# Patient Record
Sex: FEMALE | Race: BLACK OR AFRICAN AMERICAN | NOT HISPANIC OR LATINO | Employment: OTHER | URBAN - METROPOLITAN AREA
[De-identification: names, ages, dates, MRNs, and addresses within clinical notes are randomized per-mention and may not be internally consistent; named-entity substitution may affect disease eponyms.]

---

## 2018-01-30 ENCOUNTER — APPOINTMENT (OUTPATIENT)
Dept: GENERAL RADIOLOGY | Facility: CLINIC | Age: 78
End: 2018-01-30
Attending: EMERGENCY MEDICINE
Payer: COMMERCIAL

## 2018-01-30 ENCOUNTER — HOSPITAL ENCOUNTER (EMERGENCY)
Facility: CLINIC | Age: 78
Discharge: HOME | End: 2018-01-30
Attending: EMERGENCY MEDICINE | Admitting: EMERGENCY MEDICINE
Payer: COMMERCIAL

## 2018-01-30 VITALS
HEART RATE: 77 BPM | TEMPERATURE: 98.3 F | OXYGEN SATURATION: 99 % | WEIGHT: 140 LBS | DIASTOLIC BLOOD PRESSURE: 70 MMHG | SYSTOLIC BLOOD PRESSURE: 155 MMHG | BODY MASS INDEX: 28.28 KG/M2 | RESPIRATION RATE: 16 BRPM

## 2018-01-30 DIAGNOSIS — L03.115 CELLULITIS OF RIGHT FOOT: ICD-10-CM

## 2018-01-30 LAB
ANION GAP SERPL CALCULATED.3IONS-SCNC: 4 MMOL/L (ref 3–14)
BASOPHILS # BLD AUTO: 0 10E9/L (ref 0–0.2)
BASOPHILS NFR BLD AUTO: 0.2 %
BUN SERPL-MCNC: 14 MG/DL (ref 7–30)
CALCIUM SERPL-MCNC: 8.8 MG/DL (ref 8.5–10.1)
CHLORIDE SERPL-SCNC: 102 MMOL/L (ref 94–109)
CO2 SERPL-SCNC: 34 MMOL/L (ref 20–32)
CREAT SERPL-MCNC: 0.73 MG/DL (ref 0.52–1.04)
CRP SERPL-MCNC: 19.1 MG/L (ref 0–8)
CRYSTALS SNV MICRO: NORMAL
DIFFERENTIAL METHOD BLD: NORMAL
EOSINOPHIL # BLD AUTO: 0.2 10E9/L (ref 0–0.7)
EOSINOPHIL NFR BLD AUTO: 2.1 %
ERYTHROCYTE [DISTWIDTH] IN BLOOD BY AUTOMATED COUNT: 13.1 % (ref 10–15)
ERYTHROCYTE [SEDIMENTATION RATE] IN BLOOD BY WESTERGREN METHOD: 42 MM/H (ref 0–30)
GFR SERPL CREATININE-BSD FRML MDRD: 77 ML/MIN/1.7M2
GLUCOSE SERPL-MCNC: 207 MG/DL (ref 70–99)
GRAM STN SPEC: NORMAL
HCT VFR BLD AUTO: 39.9 % (ref 35–47)
HGB BLD-MCNC: 13 G/DL (ref 11.7–15.7)
IMM GRANULOCYTES # BLD: 0 10E9/L (ref 0–0.4)
IMM GRANULOCYTES NFR BLD: 0.1 %
LYMPHOCYTES # BLD AUTO: 2.8 10E9/L (ref 0.8–5.3)
LYMPHOCYTES NFR BLD AUTO: 31.4 %
MCH RBC QN AUTO: 29 PG (ref 26.5–33)
MCHC RBC AUTO-ENTMCNC: 32.6 G/DL (ref 31.5–36.5)
MCV RBC AUTO: 89 FL (ref 78–100)
MONOCYTES # BLD AUTO: 0.3 10E9/L (ref 0–1.3)
MONOCYTES NFR BLD AUTO: 2.9 %
NEUTROPHILS # BLD AUTO: 5.7 10E9/L (ref 1.6–8.3)
NEUTROPHILS NFR BLD AUTO: 63.3 %
NRBC # BLD AUTO: 0 10*3/UL
NRBC BLD AUTO-RTO: 0 /100
PLATELET # BLD AUTO: 282 10E9/L (ref 150–450)
POTASSIUM SERPL-SCNC: 3.7 MMOL/L (ref 3.4–5.3)
RBC # BLD AUTO: 4.48 10E12/L (ref 3.8–5.2)
SODIUM SERPL-SCNC: 140 MMOL/L (ref 133–144)
SPECIMEN SOURCE: NORMAL
URATE SERPL-MCNC: 3.7 MG/DL (ref 2.6–6)
WBC # BLD AUTO: 9 10E9/L (ref 4–11)

## 2018-01-30 PROCEDURE — 87205 SMEAR GRAM STAIN: CPT | Performed by: EMERGENCY MEDICINE

## 2018-01-30 PROCEDURE — 86140 C-REACTIVE PROTEIN: CPT | Performed by: EMERGENCY MEDICINE

## 2018-01-30 PROCEDURE — 99284 EMERGENCY DEPT VISIT MOD MDM: CPT | Mod: Z6 | Performed by: EMERGENCY MEDICINE

## 2018-01-30 PROCEDURE — 73630 X-RAY EXAM OF FOOT: CPT | Mod: RT

## 2018-01-30 PROCEDURE — 87070 CULTURE OTHR SPECIMN AEROBIC: CPT | Performed by: EMERGENCY MEDICINE

## 2018-01-30 PROCEDURE — 87077 CULTURE AEROBIC IDENTIFY: CPT | Performed by: EMERGENCY MEDICINE

## 2018-01-30 PROCEDURE — 80048 BASIC METABOLIC PNL TOTAL CA: CPT | Performed by: EMERGENCY MEDICINE

## 2018-01-30 PROCEDURE — 20600 DRAIN/INJ JOINT/BURSA W/O US: CPT | Performed by: EMERGENCY MEDICINE

## 2018-01-30 PROCEDURE — 85025 COMPLETE CBC W/AUTO DIFF WBC: CPT | Performed by: EMERGENCY MEDICINE

## 2018-01-30 PROCEDURE — 99284 EMERGENCY DEPT VISIT MOD MDM: CPT | Mod: 25 | Performed by: EMERGENCY MEDICINE

## 2018-01-30 PROCEDURE — 87186 SC STD MICRODIL/AGAR DIL: CPT | Performed by: EMERGENCY MEDICINE

## 2018-01-30 PROCEDURE — 85652 RBC SED RATE AUTOMATED: CPT | Performed by: EMERGENCY MEDICINE

## 2018-01-30 PROCEDURE — 25000132 ZZH RX MED GY IP 250 OP 250 PS 637: Performed by: EMERGENCY MEDICINE

## 2018-01-30 PROCEDURE — 84550 ASSAY OF BLOOD/URIC ACID: CPT | Performed by: EMERGENCY MEDICINE

## 2018-01-30 PROCEDURE — 20600 DRAIN/INJ JOINT/BURSA W/O US: CPT | Mod: Z6 | Performed by: EMERGENCY MEDICINE

## 2018-01-30 PROCEDURE — 89060 EXAM SYNOVIAL FLUID CRYSTALS: CPT | Performed by: EMERGENCY MEDICINE

## 2018-01-30 RX ORDER — HYDROCODONE BITARTRATE AND ACETAMINOPHEN 5; 325 MG/1; MG/1
1 TABLET ORAL EVERY 6 HOURS PRN
Qty: 20 TABLET | Refills: 0 | Status: SHIPPED | OUTPATIENT
Start: 2018-01-30 | End: 2020-03-05

## 2018-01-30 RX ORDER — HYDROCODONE BITARTRATE AND ACETAMINOPHEN 5; 325 MG/1; MG/1
1 TABLET ORAL ONCE
Status: COMPLETED | OUTPATIENT
Start: 2018-01-30 | End: 2018-01-30

## 2018-01-30 RX ORDER — ASPIRIN 81 MG
100 TABLET, DELAYED RELEASE (ENTERIC COATED) ORAL 2 TIMES DAILY PRN
Qty: 30 TABLET | Refills: 0 | Status: SHIPPED | OUTPATIENT
Start: 2018-01-30 | End: 2020-03-05

## 2018-01-30 RX ADMIN — HYDROCODONE BITARTRATE AND ACETAMINOPHEN 1 TABLET: 5; 325 TABLET ORAL at 11:19

## 2018-01-30 ASSESSMENT — ENCOUNTER SYMPTOMS
COLOR CHANGE: 0
FEVER: 0
DIFFICULTY URINATING: 0
NECK STIFFNESS: 0
ARTHRALGIAS: 0
EYE REDNESS: 0
ABDOMINAL PAIN: 0
HEADACHES: 0
CONFUSION: 0
SHORTNESS OF BREATH: 0

## 2018-01-30 NOTE — ED PROVIDER NOTES
History     Chief Complaint   Patient presents with     Foot Pain     medial aspect of right foot by base of great toe with pain and swelling; pain described as sharp, warm, pulsating; symptoms present for 2 days     HPI  Ariana Barnes is a 78 year old female with a history of diabetes who is emergency department 2 day history of right foot pain, redness, and swelling.  Patient denies any recent injury.  She denies previous symptoms in the past.  Patient denies any fever, sweats or chills.  She complains of pain localized to the medial aspect of the right foot near the first MTP joint.  Denies ankle pain or leg pain.  No swelling.  Patient denies any recent travel or prolonged immobilization    I have reviewed the Medications, Allergies, Past Medical and Surgical History, and Social History in the Epic system.    Review of Systems   Constitutional: Negative for fever.   HENT: Negative for congestion.    Eyes: Negative for redness.   Respiratory: Negative for shortness of breath.    Cardiovascular: Negative for chest pain.   Gastrointestinal: Negative for abdominal pain.   Genitourinary: Negative for difficulty urinating.   Musculoskeletal: Negative for arthralgias and neck stiffness.        Right great toe pain   Skin: Negative for color change.   Neurological: Negative for headaches.   Psychiatric/Behavioral: Negative for confusion.   All other systems reviewed and are negative.      Physical Exam   BP: 155/70  Pulse: 77  Temp: 98.3  F (36.8  C)  Resp: 16  Weight: 63.5 kg (140 lb)  SpO2: 99 %      Physical Exam   Constitutional: She appears well-developed and well-nourished. No distress.   HENT:   Head: Normocephalic and atraumatic.   Eyes: Pupils are equal, round, and reactive to light. No scleral icterus.   Cardiovascular: Normal rate, regular rhythm, normal heart sounds and intact distal pulses.    Pulmonary/Chest: Effort normal and breath sounds normal. No respiratory distress.   Abdominal: Soft. Bowel sounds  "are normal. There is no tenderness.   Musculoskeletal: She exhibits no edema.        Right foot: There is decreased range of motion (right great toe @ MTP) and tenderness. There is normal capillary refill, no crepitus and no deformity.        Feet:    Neurological: She is alert. She has normal strength. No sensory deficit.   Skin: Skin is warm and dry. No rash noted. She is not diaphoretic.   Nursing note and vitals reviewed.      ED Course     ED Course     Arthrocentesis  Date/Time: 1/30/2018 1:38 PM  Performed by: CONSTANZA GARDUNO  Authorized by: CONSTANZA GARDUNO   Consent: Verbal consent obtained. Written consent obtained.  Consent given by: patient  Time out: Immediately prior to procedure a \"time out\" was called to verify the correct patient, procedure, equipment, support staff and site/side marked as required.  Indications: joint swelling, pain and diagnostic evaluation   Body area: toe  Location details: right big toe  Joint: right big MTP  Local anesthesia used: yes  Anesthesia: local infiltration    Anesthesia:  Local anesthesia used: yes  Local Anesthetic: lidocaine 1% without epinephrine    Sedation:  Patient sedated: no  Preparation: Patient was prepped and draped in the usual sterile fashion.  Needle size: 22 G  Approach: anterolateral.  Aspirate: clear,  yellow and blood-tinged  Patient tolerance: Patient tolerated the procedure well with no immediate complications              Appoxiamtely 1/2 cc of clear, yellow fluid followed by gross blood.    Critical Care time:    Results for orders placed or performed during the hospital encounter of 01/30/18 (from the past 24 hour(s))   CBC with platelets differential   Result Value Ref Range    WBC 9.0 4.0 - 11.0 10e9/L    RBC Count 4.48 3.8 - 5.2 10e12/L    Hemoglobin 13.0 11.7 - 15.7 g/dL    Hematocrit 39.9 35.0 - 47.0 %    MCV 89 78 - 100 fl    MCH 29.0 26.5 - 33.0 pg    MCHC 32.6 31.5 - 36.5 g/dL    RDW 13.1 10.0 - 15.0 %    Platelet Count 282 150 - 450 10e9/L    " Diff Method Automated Method     % Neutrophils 63.3 %    % Lymphocytes 31.4 %    % Monocytes 2.9 %    % Eosinophils 2.1 %    % Basophils 0.2 %    % Immature Granulocytes 0.1 %    Nucleated RBCs 0 0 /100    Absolute Neutrophil 5.7 1.6 - 8.3 10e9/L    Absolute Lymphocytes 2.8 0.8 - 5.3 10e9/L    Absolute Monocytes 0.3 0.0 - 1.3 10e9/L    Absolute Eosinophils 0.2 0.0 - 0.7 10e9/L    Absolute Basophils 0.0 0.0 - 0.2 10e9/L    Abs Immature Granulocytes 0.0 0 - 0.4 10e9/L    Absolute Nucleated RBC 0.0    Basic metabolic panel   Result Value Ref Range    Sodium 140 133 - 144 mmol/L    Potassium 3.7 3.4 - 5.3 mmol/L    Chloride 102 94 - 109 mmol/L    Carbon Dioxide 34 (H) 20 - 32 mmol/L    Anion Gap 4 3 - 14 mmol/L    Glucose 207 (H) 70 - 99 mg/dL    Urea Nitrogen 14 7 - 30 mg/dL    Creatinine 0.73 0.52 - 1.04 mg/dL    GFR Estimate 77 >60 mL/min/1.7m2    GFR Estimate If Black >90 >60 mL/min/1.7m2    Calcium 8.8 8.5 - 10.1 mg/dL   CRP inflammation   Result Value Ref Range    CRP Inflammation 19.1 (H) 0.0 - 8.0 mg/L   Erythrocyte sedimentation rate auto   Result Value Ref Range    Sed Rate 42 (H) 0 - 30 mm/h   Uric acid   Result Value Ref Range    Uric Acid 3.7 2.6 - 6.0 mg/dL   Foot  XR, G/E 3 views, right    Narrative    XR FOOT RT G/E 3 VW 1/30/2018 12:53 PM    HISTORY: Pain.    COMPARISON: None.    FINDINGS: No fracture or malalignment. Osseous structures are within  normal limits.      Impression    IMPRESSION: No acute osseous abnormality.    JENNIFER JIMENEZ MD   Crystal ID joint fluid   Result Value Ref Range    Crystal Analysis No clincally significant crystals seen.  NOCRYS^No clincally significant crystals seen.   Gram stain   Result Value Ref Range    Specimen Description Right Foot Aspirate     Gram Stain No organisms seen     Gram Stain Few  WBC'S seen  predominantly PMN's       Gram Stain Called to   LUCÍA SMILEY RN @1600 1/30/18. CT                Assessments & Plan (with Medical Decision Making)   78 year old  female with erythema, pain, and swelling along the medial aspect of the right foot near the MTP.  She is diabetic.  Differential diagnosis includes crystal arthropathy, septic arthritis, and cellulitis.  The patient is not febrile.  She appears nontoxic.  Her white blood cell count is normal and her inflammatory markers are mildly elevated.  The patient's uric acid level was low.  The differential diagnosis was discussed with the patient and her daughter.  After informed consent was obtained, right MTP arthrocentesis was performed with a lateral approach remote from the area of erythema with return of clear yellow fluid followed by gross blood.  Unfortunately, the blood clotted and cell count and differential was not able to be performed on the specimen.  No crystals were seen and Gram stain is negative.  Aspirate culture is currently pending.  With negative crystals and Gram stain and the appearance of clear yellow fluid, my suspicion is that the patient's foot erythema represents a foot cellulitis.  She does not have a foot ulcer so I think the likelihood of osteomyelitis is low.  The patient will be initiated on Augmentin.  Norco prescribed for pain and Colace as needed for constipation.  Patient was provided with a hard bottom shoe.  She was asked to follow-up with her primary care provider this week for recheck.    I have reviewed the nursing notes.    I have reviewed the findings, diagnosis, plan and need for follow up with the patient.    New Prescriptions    AMOXICILLIN-CLAVULANATE (AUGMENTIN) 875-125 MG PER TABLET    Take 1 tablet by mouth 2 times daily    DOCUSATE SODIUM (COLACE) 100 MG TABLET    Take 100 mg by mouth 2 times daily as needed for constipation    HYDROCODONE-ACETAMINOPHEN (NORCO) 5-325 MG PER TABLET    Take 1 tablet by mouth every 6 hours as needed for moderate to severe pain       Final diagnoses:   Cellulitis of right foot       1/30/2018   [facility], [place], EMERGENCY DEPARTMENT     Lakeisha  MD Abdulaziz  01/30/18 [id number]

## 2018-01-30 NOTE — DISCHARGE INSTRUCTIONS
Take complete course of Augmentin.  Take Norco as needed for pain.  Take Colace if you are constipated while taking Norco.  Wear hard bottom shoe.    Please make an appointment to follow up with Your Primary Care Provider in 3 days.

## 2018-01-30 NOTE — ED AVS SNAPSHOT
[facility], Emergency Department    [address]    [place] [address]    Phone:  [phone]    Fax:  [phone]                                       Ariana Barnes   MRN: [id number]    Department:  [facility], Emergency Department   Date of Visit:  1/30/2018           Patient Information     Date Of Birth          1940        Your diagnoses for this visit were:     Cellulitis of right foot        You were seen by Abdulaziz Suazo MD.        Discharge Instructions       Take complete course of Augmentin.  Take Norco as needed for pain.  Take Colace if you are constipated while taking Norco.  Wear hard bottom shoe.    Please make an appointment to follow up with Your Primary Care Provider in 3 days.    24 Hour Appointment Hotline       To make an appointment at any [facility] clinic, call [phone] ([phone]). If you don't have a family doctor or clinic, we will help you find one. [facility] clinics are conveniently located to serve the needs of you and your family.          ED Discharge Orders     Post Op shoe                    Review of your medicines      START taking        Dose / Directions Last dose taken    amoxicillin-clavulanate 875-125 MG per tablet   Commonly known as:  AUGMENTIN   Dose:  1 tablet   Quantity:  28 tablet        Take 1 tablet by mouth 2 times daily   Refills:  0        docusate sodium 100 MG tablet   Commonly known as:  COLACE   Dose:  100 mg   Quantity:  30 tablet        Take 100 mg by mouth 2 times daily as needed for constipation   Refills:  0        HYDROcodone-acetaminophen 5-325 MG per tablet   Commonly known as:  NORCO   Dose:  1 tablet   Quantity:  20 tablet        Take 1 tablet by mouth every 6 hours as needed for moderate to severe pain   Refills:  0          Our records show that you are taking the medicines listed below. If these are incorrect, please call your family doctor or clinic.        Dose / Directions Last dose taken    Alendronate Sodium 70  MG Tbef        Take by mouth every 7 days   Refills:  0        aspirin 81 MG tablet   Dose:  1 tablet        Take 1 tablet by mouth daily.   Refills:  0        atenolol-chlorthalidone 50-25 MG per tablet   Commonly known as:  TENORETIC   Dose:  1 tablet        Take 1 tablet by mouth daily   Refills:  0        ATORVASTATIN CALCIUM PO   Dose:  40 mg        Take 40 mg by mouth daily   Refills:  0        GEODON PO   Dose:  40 mg        Take 40 mg by mouth 3 times daily.   Refills:  0        GLYBURIDE PO   Dose:  10 mg        Take 10 mg by mouth daily (with breakfast)   Refills:  0        LOSARTAN POTASSIUM PO   Dose:  100 mg        Take 100 mg by mouth daily   Refills:  0        multivitamin, therapeutic with minerals Tabs tablet   Dose:  1 tablet        Take 1 tablet by mouth daily   Refills:  0        sucralfate 1 GM/10ML suspension   Commonly known as:  CARAFATE   Dose:  1 g   Quantity:  420 mL        Take 10 mLs by mouth 4 times daily.   Refills:  1        TRAZODONE HCL PO   Dose:  150 mg        Take 150 mg by mouth nightly as needed.   Refills:  0        TYLENOL 325 MG tablet   Dose:  1-2 tablet   Generic drug:  acetaminophen        Take 1-2 tablets by mouth every 6 hours as needed.   Refills:  0                Prescriptions were sent or printed at these locations (3 Prescriptions)                   Other Prescriptions                Printed at Department/Unit printer (3 of 3)         amoxicillin-clavulanate (AUGMENTIN) 875-125 MG per tablet               HYDROcodone-acetaminophen (NORCO) 5-325 MG per tablet               docusate sodium (COLACE) 100 MG tablet                Procedures and tests performed during your visit     Arthrocentesis    Basic metabolic panel    CBC with platelets differential    CRP inflammation    Crystal ID joint fluid    Erythrocyte sedimentation rate auto    Fluid Culture Aerobic Bacterial    Foot  XR, G/E 3 views, right    Gram stain    Peripheral IV catheter    Uric acid      Orders  "Needing Specimen Collection     None      Pending Results     Date and Time Order Name Status Description    2018 1336 Fluid Culture Aerobic Bacterial In process             Pending Culture Results     Date and Time Order Name Status Description    2018 1336 Fluid Culture Aerobic Bacterial In process             Pending Results Instructions     If you had any lab results that were not finalized at the time of your Discharge, you can call the ED Lab Result RN at [phone]. You will be contacted by this team for any positive Lab results or changes in treatment. The nurses are available 7 days a week from 10A to 6:30P.  You can leave a message 24 hours per day and they will return your call.        Thank you for choosing [facility]       Thank you for choosing [facility] for your care. Our goal is always to provide you with excellent care. Hearing back from our patients is one way we can continue to improve our services. Please take a few minutes to complete the written survey that you may receive in the mail after you visit with us. Thank you!        [employer] Information     [employer] lets you send messages to your doctor, view your test results, renew your prescriptions, schedule appointments and more. To sign up, go to [email or web address] . Click on \"Log in\" on the left side of the screen, which will take you to the Welcome page. Then click on \"Sign up Now\" on the right side of the page.     You will be asked to enter the access code listed below, as well as some personal information. Please follow the directions to create your username and password.     Your access code is: [email or web address]  Expires: 2018  4:10 PM     Your access code will  in 90 days. If you need help or a new code, please call your [facility] clinic or [phone].        Care EveryWhere ID     This is your Care EveryWhere ID. This could be used by other organizations to access your [facility] medical records  [id number]      "   Equal Access to Services     HENNY CASTELLON : Kelly Martins, delmy johansen, kiesha cote. So [facility] [phone].    ATENCIÓN: Si habla español, tiene a carter disposición servicios gratuitos de asistencia lingüística. Llame al [phone].    We comply with applicable federal civil rights laws and [place] laws. We do not discriminate on the basis of race, color, national origin, age, disability, sex, sexual orientation, or gender identity.            After Visit Summary       This is your record. Keep this with you and show to your community pharmacist(s) and doctor(s) at your next visit.

## 2018-01-30 NOTE — ED AVS SNAPSHOT
[facility], Emergency Department    [address]    [facility] [address]    Phone:  [phone]    Fax:  [phone]                                       Ariana Barnes   MRN: [id number]    Department:  [facility], Emergency Department   Date of Visit:  1/30/2018           After Visit Summary Signature Page     I have received my discharge instructions, and my questions have been answered. I have discussed any challenges I see with this plan with the nurse or doctor.    ..........................................................................................................................................  Patient/Patient Representative Signature      ..........................................................................................................................................  Patient Representative Print Name and Relationship to Patient    ..................................................               ................................................  Date                                            Time    ..........................................................................................................................................  Reviewed by Signature/Title    ...................................................              ..............................................  Date                                                            Time

## 2018-02-02 ENCOUNTER — TELEPHONE (OUTPATIENT)
Dept: EMERGENCY MEDICINE | Facility: CLINIC | Age: 78
End: 2018-02-02

## 2018-02-02 NOTE — TELEPHONE ENCOUNTER
[facility] Lab result notification:    [facility] ED lab result protocol used  General Culture    Reason for call  Notify of lab results, assess symptoms,  review ED providers recommendations/discharge instructions (if necessary) and advise per ED lab result f/u protocol    Lab Result  Final FLUID culture report on 2/4/18  [facility] ED discharge antibiotic: Amoxicillin-Clavulanate (Augmentin) 875-125 mg PO tablet,  1 tablet by mouth 2 times daily for 14 days  #1. Bacteria, On day 2, isolated in broth only: Coagulase negative Staphylococcus , which is [SUSCEPTIBLE] to ED discharge antibiotic.   Patient to be notified of result, symptoms's assessed and advised per [facility] ED lab result protocol.    Information table from ED Provider visit on 1/30/18  Symptoms reported at ED visit (Chief complaint, HPI) Ariana Barnes is a 78 year old female with a history of diabetes who is emergency department 2 day history of right foot pain, redness, and swelling.  Patient denies any recent injury.  She denies previous symptoms in the past.  Patient denies any fever, sweats or chills.  She complains of pain localized to the medial aspect of the right foot near the first MTP joint.  Denies ankle pain or leg pain.  No swelling.  Patient denies any recent travel or prolonged immobilization   ED providers Impression and Plan (applicable information) 78 year old female with erythema, pain, and swelling along the medial aspect of the right foot near the MTP.  She is diabetic.  Differential diagnosis includes crystal arthropathy, septic arthritis, and cellulitis.  The patient is not febrile.  She appears nontoxic.  Her white blood cell count is normal and her inflammatory markers are mildly elevated.  The patient's uric acid level was low.  The differential diagnosis was discussed with the patient and her daughter.  After informed consent was obtained, right MTP arthrocentesis was performed with a lateral  approach remote from the area of erythema with return of clear yellow fluid followed by gross blood.  Unfortunately, the blood clotted and cell count and differential was not able to be performed on the specimen.  No crystals were seen and Gram stain is negative.  Aspirate culture is currently pending.  With negative crystals and Gram stain and the appearance of clear yellow fluid, my suspicion is that the patient's foot erythema represents a foot cellulitis.  She does not have a foot ulcer so I think the likelihood of osteomyelitis is low.  The patient will be initiated on Augmentin.  Norco prescribed for pain and Colace as needed for constipation.  Patient was provided with a hard bottom shoe.  She was asked to follow-up with her primary care provider this week for recheck.   Miscellaneous information N/A     RN Assessment (Patient s current Symptoms), include time called.  [Insert Left message here if message left]  Did speak via  to dtr, left msg.  Dtr does report the leg is still painful.  No f/u with PCP yet, is taking abx as prescribed.  Phone number left with dtr, patient currently sleeping.  Advised if any worsening or any concerns, to have Ariana call back.  Advised if any changes on final fluid culture results, we will call back.      Please Contact your PCP clinic or return to the Emergency department if your:    Symptoms return.    Symptoms do not improve after 3 days on antibiotic.    Symptoms do not resolve after completing antibiotic.    Symptoms worsen or other concerning symptom's.    PCP follow-up Questions asked: YES       Danae Ochoa RN    [facility] RN  Lung Nodule and ED Lab Results F/U RN  Epic pool (ED late result f/u RN) : [id number]   # [phone]    Copy of Lab result   Order   Fluid Culture Aerobic Bacterial [[id number]] (Order [id number])   Preliminary Result   Exam Information   Exam Date Exam Time Accession # Results    1/30/18  1:37 PM [id number]     Component Results   Component Collected Lab   Specimen Description 01/30/2018  1:37 PM 75   Right Foot Aspirate   Culture Micro (Abnormal) 01/30/2018  1:37 PM 75   On day 2, isolated in broth only:   Coagulase negative Staphylococcus   These bacteria are part of normal skin antionette, but on occasion, may be true pathogens.     Clinical correlation must be applied to interpreting this microbiology result.      Culture Micro 01/30/2018  1:37 PM 75   Culture in progress

## 2018-02-03 LAB
BACTERIA SPEC CULT: ABNORMAL
SPECIMEN SOURCE: ABNORMAL

## 2020-03-05 ENCOUNTER — HOSPITAL ENCOUNTER (OUTPATIENT)
Facility: CLINIC | Age: 80
Setting detail: OBSERVATION
Discharge: HOME | End: 2020-03-06
Attending: EMERGENCY MEDICINE | Admitting: EMERGENCY MEDICINE
Payer: COMMERCIAL

## 2020-03-05 ENCOUNTER — APPOINTMENT (OUTPATIENT)
Dept: GENERAL RADIOLOGY | Facility: CLINIC | Age: 80
End: 2020-03-05
Attending: EMERGENCY MEDICINE
Payer: COMMERCIAL

## 2020-03-05 DIAGNOSIS — R53.81 PHYSICAL DECONDITIONING: Primary | ICD-10-CM

## 2020-03-05 DIAGNOSIS — R55 NEAR SYNCOPE: ICD-10-CM

## 2020-03-05 DIAGNOSIS — I95.9 TRANSIENT HYPOTENSION: ICD-10-CM

## 2020-03-05 DIAGNOSIS — E11.9 TYPE 2 DIABETES MELLITUS WITHOUT COMPLICATION, WITHOUT LONG-TERM CURRENT USE OF INSULIN (H): ICD-10-CM

## 2020-03-05 LAB
ALBUMIN SERPL-MCNC: 3.5 G/DL (ref 3.4–5)
ALBUMIN UR-MCNC: NEGATIVE MG/DL
ALP SERPL-CCNC: 73 U/L (ref 40–150)
ALT SERPL W P-5'-P-CCNC: 16 U/L (ref 0–50)
ANION GAP SERPL CALCULATED.3IONS-SCNC: 9 MMOL/L (ref 3–14)
APPEARANCE UR: CLEAR
AST SERPL W P-5'-P-CCNC: 23 U/L (ref 0–45)
BACTERIA #/AREA URNS HPF: ABNORMAL /HPF
BASOPHILS # BLD AUTO: 0 10E9/L (ref 0–0.2)
BASOPHILS NFR BLD AUTO: 0.2 %
BILIRUB SERPL-MCNC: 0.5 MG/DL (ref 0.2–1.3)
BILIRUB UR QL STRIP: NEGATIVE
BUN SERPL-MCNC: 20 MG/DL (ref 7–30)
CALCIUM SERPL-MCNC: 9.7 MG/DL (ref 8.5–10.1)
CAPILLARY BLOOD COLLECTION: NORMAL
CHLORIDE SERPL-SCNC: 105 MMOL/L (ref 94–109)
CO2 SERPL-SCNC: 23 MMOL/L (ref 20–32)
COLOR UR AUTO: YELLOW
CREAT SERPL-MCNC: 0.63 MG/DL (ref 0.52–1.04)
DIFFERENTIAL METHOD BLD: ABNORMAL
EOSINOPHIL # BLD AUTO: 0.2 10E9/L (ref 0–0.7)
EOSINOPHIL NFR BLD AUTO: 1.3 %
ERYTHROCYTE [DISTWIDTH] IN BLOOD BY AUTOMATED COUNT: 12.9 % (ref 10–15)
GFR SERPL CREATININE-BSD FRML MDRD: 85 ML/MIN/{1.73_M2}
GLUCOSE SERPL-MCNC: 258 MG/DL (ref 70–99)
GLUCOSE UR STRIP-MCNC: 300 MG/DL
HCT VFR BLD AUTO: 40.7 % (ref 35–47)
HGB BLD-MCNC: 13.8 G/DL (ref 11.7–15.7)
HGB UR QL STRIP: NEGATIVE
HYALINE CASTS #/AREA URNS LPF: 3 /LPF (ref 0–2)
IMM GRANULOCYTES # BLD: 0 10E9/L (ref 0–0.4)
IMM GRANULOCYTES NFR BLD: 0.3 %
INTERPRETATION ECG - MUSE: NORMAL
KETONES UR STRIP-MCNC: NEGATIVE MG/DL
LEUKOCYTE ESTERASE UR QL STRIP: NEGATIVE
LYMPHOCYTES # BLD AUTO: 2.7 10E9/L (ref 0.8–5.3)
LYMPHOCYTES NFR BLD AUTO: 19.9 %
MCH RBC QN AUTO: 29.2 PG (ref 26.5–33)
MCHC RBC AUTO-ENTMCNC: 33.9 G/DL (ref 31.5–36.5)
MCV RBC AUTO: 86 FL (ref 78–100)
MONOCYTES # BLD AUTO: 0.4 10E9/L (ref 0–1.3)
MONOCYTES NFR BLD AUTO: 2.9 %
MUCOUS THREADS #/AREA URNS LPF: PRESENT /LPF
NEUTROPHILS # BLD AUTO: 10.2 10E9/L (ref 1.6–8.3)
NEUTROPHILS NFR BLD AUTO: 75.4 %
NITRATE UR QL: NEGATIVE
NRBC # BLD AUTO: 0 10*3/UL
NRBC BLD AUTO-RTO: 0 /100
PH UR STRIP: 6.5 PH (ref 5–7)
PLATELET # BLD AUTO: 277 10E9/L (ref 150–450)
POTASSIUM SERPL-SCNC: 5.6 MMOL/L (ref 3.4–5.3)
PROT SERPL-MCNC: 8.1 G/DL (ref 6.8–8.8)
RBC # BLD AUTO: 4.72 10E12/L (ref 3.8–5.2)
RBC #/AREA URNS AUTO: 1 /HPF (ref 0–2)
SODIUM SERPL-SCNC: 137 MMOL/L (ref 133–144)
SOURCE: ABNORMAL
SP GR UR STRIP: 1.01 (ref 1–1.03)
SQUAMOUS #/AREA URNS AUTO: 1 /HPF (ref 0–1)
TROPONIN I SERPL-MCNC: <0.015 UG/L (ref 0–0.04)
UROBILINOGEN UR STRIP-MCNC: NORMAL MG/DL (ref 0–2)
WBC # BLD AUTO: 13.6 10E9/L (ref 4–11)
WBC #/AREA URNS AUTO: 3 /HPF (ref 0–5)

## 2020-03-05 PROCEDURE — 81001 URINALYSIS AUTO W/SCOPE: CPT | Performed by: EMERGENCY MEDICINE

## 2020-03-05 PROCEDURE — 84484 ASSAY OF TROPONIN QUANT: CPT | Performed by: EMERGENCY MEDICINE

## 2020-03-05 PROCEDURE — 25800030 ZZH RX IP 258 OP 636: Performed by: EMERGENCY MEDICINE

## 2020-03-05 PROCEDURE — 96361 HYDRATE IV INFUSION ADD-ON: CPT | Performed by: EMERGENCY MEDICINE

## 2020-03-05 PROCEDURE — 93005 ELECTROCARDIOGRAM TRACING: CPT | Performed by: EMERGENCY MEDICINE

## 2020-03-05 PROCEDURE — 96360 HYDRATION IV INFUSION INIT: CPT | Performed by: EMERGENCY MEDICINE

## 2020-03-05 PROCEDURE — 85025 COMPLETE CBC W/AUTO DIFF WBC: CPT | Performed by: EMERGENCY MEDICINE

## 2020-03-05 PROCEDURE — 99285 EMERGENCY DEPT VISIT HI MDM: CPT | Mod: 25 | Performed by: EMERGENCY MEDICINE

## 2020-03-05 PROCEDURE — 93010 ELECTROCARDIOGRAM REPORT: CPT | Mod: Z6 | Performed by: EMERGENCY MEDICINE

## 2020-03-05 PROCEDURE — G0378 HOSPITAL OBSERVATION PER HR: HCPCS

## 2020-03-05 PROCEDURE — 36416 COLLJ CAPILLARY BLOOD SPEC: CPT | Performed by: EMERGENCY MEDICINE

## 2020-03-05 PROCEDURE — 71046 X-RAY EXAM CHEST 2 VIEWS: CPT

## 2020-03-05 PROCEDURE — 80053 COMPREHEN METABOLIC PANEL: CPT | Performed by: EMERGENCY MEDICINE

## 2020-03-05 RX ORDER — CHOLECALCIFEROL (VITAMIN D3) 50 MCG
2000 TABLET ORAL DAILY
COMMUNITY

## 2020-03-05 RX ORDER — ATORVASTATIN CALCIUM 40 MG/1
80 TABLET, FILM COATED ORAL DAILY
Status: DISCONTINUED | OUTPATIENT
Start: 2020-03-06 | End: 2020-03-06 | Stop reason: HOSPADM

## 2020-03-05 RX ORDER — ONDANSETRON 2 MG/ML
4 INJECTION INTRAMUSCULAR; INTRAVENOUS EVERY 6 HOURS PRN
Status: DISCONTINUED | OUTPATIENT
Start: 2020-03-05 | End: 2020-03-06 | Stop reason: HOSPADM

## 2020-03-05 RX ORDER — LIDOCAINE 40 MG/G
CREAM TOPICAL
Status: DISCONTINUED | OUTPATIENT
Start: 2020-03-05 | End: 2020-03-06 | Stop reason: HOSPADM

## 2020-03-05 RX ORDER — ASPIRIN 81 MG/1
81 TABLET ORAL DAILY
Status: DISCONTINUED | OUTPATIENT
Start: 2020-03-06 | End: 2020-03-06 | Stop reason: HOSPADM

## 2020-03-05 RX ORDER — ONDANSETRON 4 MG/1
4 TABLET, ORALLY DISINTEGRATING ORAL EVERY 6 HOURS PRN
Status: DISCONTINUED | OUTPATIENT
Start: 2020-03-05 | End: 2020-03-06 | Stop reason: HOSPADM

## 2020-03-05 RX ORDER — ACETAMINOPHEN 325 MG/1
325-650 TABLET ORAL EVERY 6 HOURS PRN
Status: DISCONTINUED | OUTPATIENT
Start: 2020-03-05 | End: 2020-03-06 | Stop reason: HOSPADM

## 2020-03-05 RX ORDER — NITROGLYCERIN 0.4 MG/1
0.4 TABLET SUBLINGUAL EVERY 5 MIN PRN
Status: DISCONTINUED | OUTPATIENT
Start: 2020-03-05 | End: 2020-03-06 | Stop reason: HOSPADM

## 2020-03-05 RX ORDER — ACETAMINOPHEN 650 MG/1
650 SUPPOSITORY RECTAL EVERY 4 HOURS PRN
Status: DISCONTINUED | OUTPATIENT
Start: 2020-03-05 | End: 2020-03-06 | Stop reason: HOSPADM

## 2020-03-05 RX ORDER — ACETAMINOPHEN 325 MG/1
650 TABLET ORAL EVERY 4 HOURS PRN
Status: DISCONTINUED | OUTPATIENT
Start: 2020-03-05 | End: 2020-03-05

## 2020-03-05 RX ORDER — LOSARTAN POTASSIUM 100 MG/1
100 TABLET ORAL DAILY
COMMUNITY

## 2020-03-05 RX ORDER — SODIUM CHLORIDE 9 MG/ML
INJECTION, SOLUTION INTRAVENOUS ONCE
Status: COMPLETED | OUTPATIENT
Start: 2020-03-05 | End: 2020-03-05

## 2020-03-05 RX ORDER — LOSARTAN POTASSIUM 100 MG/1
100 TABLET ORAL DAILY
Status: DISCONTINUED | OUTPATIENT
Start: 2020-03-06 | End: 2020-03-06 | Stop reason: HOSPADM

## 2020-03-05 RX ADMIN — SODIUM CHLORIDE: 9 INJECTION, SOLUTION INTRAVENOUS at 17:35

## 2020-03-05 RX ADMIN — SODIUM CHLORIDE 1000 ML: 9 INJECTION, SOLUTION INTRAVENOUS at 16:01

## 2020-03-05 ASSESSMENT — ENCOUNTER SYMPTOMS
COUGH: 0
DYSURIA: 0
SHORTNESS OF BREATH: 0
FREQUENCY: 0
ABDOMINAL PAIN: 0
WEAKNESS: 1
FEVER: 0
DIFFICULTY URINATING: 0
SORE THROAT: 0
VOMITING: 0
NUMBNESS: 0
LIGHT-HEADEDNESS: 0
NAUSEA: 0

## 2020-03-05 ASSESSMENT — MIFFLIN-ST. JEOR: SCORE: 978.91

## 2020-03-05 NOTE — ED NOTES
Bed: ED19  Expected date: 3/5/20  Expected time:   Means of arrival:   Comments:  89 yr old with weakness

## 2020-03-05 NOTE — ED PROVIDER NOTES
[facility] [place] ([facility])    3/05/20       History     Chief Complaint   Patient presents with     Generalized Weakness     In clinic with gobal weakness. Vitals stable      HPI  Ariana Barnes is a 80 year old female who has a PMH of HTN and DM2, who presents to the Emergency Department from clinic after an episode of somnolence and generalized weakness.  Patient is here with her daughter who interprets and contributes to the history collected.  Patient was at [facility] clinic to have A1c checked after fasting (was 7.8).  Patient had not had anything to eat today, had water today at the clinic.  During provider exam patient was noted to have difficulty walking, generalized weakness.  Patient states that she felt like she was dying at that time but was not faint or lightheaded.  Daughter and staff noted the patient was also somewhat somnolent and not speaking.  Her blood pressure at that time was 65/38, POC glucose was 246.  Due to patient's weakness and somnolence EMS were called to bring patient to the ED.  Daughter reports that during this wait patient's systolic blood pressure improved to the 90s and that the patient never lost consciousness.  She did not receive IV fluids in clinic as this was deferred to ED.  Patient was given some water in clinic and vomited.  Daughter also reports that patient had a strong urge to have a bowel movement in clinic, attempted to get up to do so but was unable to.  Eventually when patient had access to a toilet she was unable to produce a bowel movement.    Daughter reports that patient has been urinating normally.  Patient denies urinary symptoms. Patient denies chest pain,shortness of breath, fever, cough, nausea, abdominal pain or diarrhea.  Does continue to endorse feeling weak overall.  Daughter manages her medications and visits her daily although patient lives alone.  Daughter states that she helps patient cook meals, shower and dress as  daughter feels that maybe the patient does not want to carry out these tasks herself or is not strong enough.  Patient was reportedly feeling just fine prior to the clinic visit and walked to the clinic on her own.    I have reviewed the Medications, Allergies, Past Medical and Surgical History, and Social History in the [employer] system.  PAST MEDICAL HISTORY:   Past Medical History:   Diagnosis Date     Dementia (H)      Depressive disorder      Diabetes mellitus (H)      Hypertension        PAST SURGICAL HISTORY: History reviewed. No pertinent surgical history.    FAMILY HISTORY: History reviewed. No pertinent family history.    SOCIAL HISTORY:   Social History     Tobacco Use     Smoking status: Never Smoker     Smokeless tobacco: Never Used   Substance Use Topics     Alcohol use: No       Patient's Medications   New Prescriptions    No medications on file   Previous Medications    ACETAMINOPHEN (TYLENOL) 325 MG TABLET    Take 1-2 tablets by mouth every 6 hours as needed.    ASPIRIN 81 MG TABLET    Take 1 tablet by mouth daily.    ATORVASTATIN (LIPITOR) 40 MG TABLET    Take 40 mg by mouth daily     GLYBURIDE (DIABETA /MICRONASE) 5 MG TABLET    Take 10 mg by mouth daily (with breakfast)     LOSARTAN POTASSIUM PO    Take 100 mg by mouth daily    MULTIVITAMIN, THERAPEUTIC WITH MINERALS (THERA-VIT-M) TABS    Take 1 tablet by mouth daily   Modified Medications    No medications on file   Discontinued Medications    ALENDRONATE SODIUM 70 MG TBEF    Take by mouth every 7 days    AMOXICILLIN-CLAVULANATE (AUGMENTIN) 875-125 MG PER TABLET    Take 1 tablet by mouth 2 times daily    ATENOLOL-CHLORTHALIDONE (TENORETIC) 50-25 MG PER TABLET    Take 1 tablet by mouth daily    DOCUSATE SODIUM (COLACE) 100 MG TABLET    Take 100 mg by mouth 2 times daily as needed for constipation    HYDROCODONE-ACETAMINOPHEN (NORCO) 5-325 MG PER TABLET    Take 1 tablet by mouth every 6 hours as needed for moderate to severe pain    SUCRALFATE  (CARAFATE) 1 GM/10ML SUSPENSION    Take 10 mLs by mouth 4 times daily.    TRAZODONE HCL PO    Take 150 mg by mouth nightly as needed.    ZIPRASIDONE HCL (GEODON PO)    Take 40 mg by mouth 3 times daily.          Allergies   Allergen Reactions     Dicyclomine Unknown and Dizziness     Started at [facility] 10/17; d/c'd shortly after due to SEs     Hydrochlorothiazide Unknown     Stopped taking due to abdominal pain     Isosorbide Other (See Comments)     Severe leg pain  Leg pain  Severe leg pain          Review of Systems   Constitutional: Negative for fever.   HENT: Negative for sore throat.    Respiratory: Negative for cough and shortness of breath.    Cardiovascular: Negative for chest pain.   Gastrointestinal: Negative for abdominal pain, nausea and vomiting.   Genitourinary: Negative for difficulty urinating, dysuria, frequency and urgency.   Neurological: Positive for weakness (generalized). Negative for light-headedness and numbness.   All other systems reviewed and are negative.      Physical Exam   BP: 129/79  Pulse: 71  Temp: 97.4  F (36.3  C)  Resp: 16  Weight: 64.3 kg (141 lb 12.8 oz)  SpO2: 100 %      Physical Exam  GEN:  Well developed, no acute distress  HEENT:  PERRL, EOMI, Mucous membranes are moist.   Cardio:  RRR, no murmur, radial pulses equal bilaterally  PULM:  Lungs clear, good air movement, no wheezes, rales   Abd:  Soft, normal bowel sounds, no focal tenderness  Musculoskeletal:  normal range of motion, no lower extremity swelling or calf tenderness  Neuro:  Alert and oriented X3, Follows commands, CN exam is normal, finger to nose is normal, sensation and strength is normal throughout, no pronator drift   Skin:  Warm, dry   ED Course   1:21 PM  The patient was seen and examined by Brigitte Sadler MD in Room ED19.       Procedures             EKG Interpretation:      Interpreted by Brigitte Sadler MD  Time reviewed: 13:40  Symptoms at time of EKG: near syncope today   Rhythm: normal  sinus   Rate: normal  Axis: normal  Ectopy: none  Conduction: normal  ST Segments/ T Waves: No ST-T wave changes, (T wave inversions in lead III are unchanged compared to previous EKG) minimal voltage criteria for LVH, may be normal variant  Q Waves: none  Comparison to prior: Unchanged from August 11, 2016    Clinical Impression: Unchanged EKG    Patient was given normal saline IV for possible dehydration.         Labs are normal except as shown.  Results for orders placed or performed during the hospital encounter of 03/05/20 (from the past 24 hour(s))   EKG 12-lead, tracing only   Result Value Ref Range    Interpretation ECG Click View Image link to view waveform and result    XR Chest 2 Views    Narrative    CHEST TWO VIEWS 3/5/2020 2:25 PM     HISTORY:  Near syncope.    COMPARISON:  August 11, 2016       Impression    IMPRESSION:  There are no acute infiltrates. The cardiac silhouette is  not enlarged. Pulmonary vasculature is unremarkable.     BARBARA DIAZ MD   CBC with platelets differential   Result Value Ref Range    WBC 13.6 (H) 4.0 - 11.0 10e9/L    RBC Count 4.72 3.8 - 5.2 10e12/L    Hemoglobin 13.8 11.7 - 15.7 g/dL    Hematocrit 40.7 35.0 - 47.0 %    MCV 86 78 - 100 fl    MCH 29.2 26.5 - 33.0 pg    MCHC 33.9 31.5 - 36.5 g/dL    RDW 12.9 10.0 - 15.0 %    Platelet Count 277 150 - 450 10e9/L    Diff Method Automated Method     % Neutrophils 75.4 %    % Lymphocytes 19.9 %    % Monocytes 2.9 %    % Eosinophils 1.3 %    % Basophils 0.2 %    % Immature Granulocytes 0.3 %    Nucleated RBCs 0 0 /100    Absolute Neutrophil 10.2 (H) 1.6 - 8.3 10e9/L    Absolute Lymphocytes 2.7 0.8 - 5.3 10e9/L    Absolute Monocytes 0.4 0.0 - 1.3 10e9/L    Absolute Eosinophils 0.2 0.0 - 0.7 10e9/L    Absolute Basophils 0.0 0.0 - 0.2 10e9/L    Abs Immature Granulocytes 0.0 0 - 0.4 10e9/L    Absolute Nucleated RBC 0.0    Comprehensive metabolic panel   Result Value Ref Range    Sodium 137 133 - 144 mmol/L    Potassium 5.6 (H) 3.4 -  5.3 mmol/L    Chloride 105 94 - 109 mmol/L    Carbon Dioxide 23 20 - 32 mmol/L    Anion Gap 9 3 - 14 mmol/L    Glucose 258 (H) 70 - 99 mg/dL    Urea Nitrogen 20 7 - 30 mg/dL    Creatinine 0.63 0.52 - 1.04 mg/dL    GFR Estimate 85 >60 mL/min/[1.73_m2]    GFR Estimate If Black >90 >60 mL/min/[1.73_m2]    Calcium 9.7 8.5 - 10.1 mg/dL    Bilirubin Total 0.5 0.2 - 1.3 mg/dL    Albumin 3.5 3.4 - 5.0 g/dL    Protein Total 8.1 6.8 - 8.8 g/dL    Alkaline Phosphatase 73 40 - 150 U/L    ALT 16 0 - 50 U/L    AST 23 0 - 45 U/L   Troponin I   Result Value Ref Range    Troponin I ES <0.015 0.000 - 0.045 ug/L   Capillary Blood Collection   Result Value Ref Range    Capillary Blood Collection Capillary collection performed    UA with Microscopic reflex to Culture   Result Value Ref Range    Color Urine Yellow     Appearance Urine Clear     Glucose Urine 300 (A) NEG^Negative mg/dL    Bilirubin Urine Negative NEG^Negative    Ketones Urine Negative NEG^Negative mg/dL    Specific Gravity Urine 1.009 1.003 - 1.035    Blood Urine Negative NEG^Negative    pH Urine 6.5 5.0 - 7.0 pH    Protein Albumin Urine Negative NEG^Negative mg/dL    Urobilinogen mg/dL Normal 0.0 - 2.0 mg/dL    Nitrite Urine Negative NEG^Negative    Leukocyte Esterase Urine Negative NEG^Negative    Source Midstream Urine     WBC Urine 3 0 - 5 /HPF    RBC Urine 1 0 - 2 /HPF    Bacteria Urine Few (A) NEG^Negative /HPF    Squamous Epithelial /HPF Urine 1 0 - 1 /HPF    Mucous Urine Present (A) NEG^Negative /LPF    Hyaline Casts 3 (H) 0 - 2 /LPF     Medications   sodium chloride 0.9% infusion (has no administration in time range)   0.9% sodium chloride BOLUS (1,000 mLs Intravenous New Bag 3/5/20 1601)         Chest x-ray was reviewed by me and results are shown here:  Results for orders placed or performed during the hospital encounter of 03/05/20   XR Chest 2 Views    Narrative    CHEST TWO VIEWS 3/5/2020 2:25 PM     HISTORY:  Near syncope.    COMPARISON:  August 11, 2016        Impression    IMPRESSION:  There are no acute infiltrates. The cardiac silhouette is  not enlarged. Pulmonary vasculature is unremarkable.     BARBARA DIAZ MD          Assessments & Plan (with Medical Decision Making)   Patient presents after what is described as a near syncopal event at the clinic earlier today.  She felt quite weak when she got up to walk, and her blood pressure was very low at the time.  She seemed sleepy and was not particularly interactive with her daughter at the time, but did not lose consciousness.  Patient had no chest pain or shortness of breath, however, I do think a cardiac work-up is indicated.  She has no sign of infection remarkable.  There was no sign of seizure activity.  At this time, the plan is to admit to the observation unit for overnight telemetry monitoring and echocardiogram.    I have reviewed the nursing notes.    I have reviewed the findings, diagnosis, plan and need for follow up with the patient.    New Prescriptions    No medications on file       Final diagnoses:   Near syncope   Transient hypotension     ISuleiman, am serving as a trained medical scribe to document services personally performed by Brigitte Sadler MD, based on the provider's statements to me.   Brigitte STEPHENS MD, was physically present and have reviewed and verified the accuracy of this note documented by Suleiman Briggs.     3/5/2020   [facility], [place], EMERGENCY DEPARTMENT     Brigitte Sadler MD  03/05/20 [id number]

## 2020-03-06 ENCOUNTER — APPOINTMENT (OUTPATIENT)
Dept: CARDIOLOGY | Facility: CLINIC | Age: 80
End: 2020-03-06
Attending: PHYSICIAN ASSISTANT
Payer: COMMERCIAL

## 2020-03-06 ENCOUNTER — OFFICE VISIT (OUTPATIENT)
Dept: INTERPRETER SERVICES | Facility: CLINIC | Age: 80
End: 2020-03-06
Payer: COMMERCIAL

## 2020-03-06 ENCOUNTER — APPOINTMENT (OUTPATIENT)
Dept: PHYSICAL THERAPY | Facility: CLINIC | Age: 80
End: 2020-03-06
Attending: PHYSICIAN ASSISTANT
Payer: COMMERCIAL

## 2020-03-06 VITALS
TEMPERATURE: 98.3 F | DIASTOLIC BLOOD PRESSURE: 76 MMHG | BODY MASS INDEX: 26.81 KG/M2 | OXYGEN SATURATION: 97 % | HEIGHT: 59 IN | WEIGHT: 133 LBS | RESPIRATION RATE: 16 BRPM | SYSTOLIC BLOOD PRESSURE: 158 MMHG | HEART RATE: 79 BPM

## 2020-03-06 LAB
ANION GAP SERPL CALCULATED.3IONS-SCNC: 6 MMOL/L (ref 3–14)
BUN SERPL-MCNC: 18 MG/DL (ref 7–30)
CALCIUM SERPL-MCNC: 8.6 MG/DL (ref 8.5–10.1)
CHLORIDE SERPL-SCNC: 110 MMOL/L (ref 94–109)
CO2 SERPL-SCNC: 24 MMOL/L (ref 20–32)
CREAT SERPL-MCNC: 0.67 MG/DL (ref 0.52–1.04)
ERYTHROCYTE [DISTWIDTH] IN BLOOD BY AUTOMATED COUNT: 13.1 % (ref 10–15)
GFR SERPL CREATININE-BSD FRML MDRD: 83 ML/MIN/{1.73_M2}
GLUCOSE SERPL-MCNC: 188 MG/DL (ref 70–99)
HCT VFR BLD AUTO: 37 % (ref 35–47)
HGB BLD-MCNC: 11.9 G/DL (ref 11.7–15.7)
MCH RBC QN AUTO: 28.8 PG (ref 26.5–33)
MCHC RBC AUTO-ENTMCNC: 32.2 G/DL (ref 31.5–36.5)
MCV RBC AUTO: 90 FL (ref 78–100)
PLATELET # BLD AUTO: 274 10E9/L (ref 150–450)
POTASSIUM SERPL-SCNC: 3.4 MMOL/L (ref 3.4–5.3)
RBC # BLD AUTO: 4.13 10E12/L (ref 3.8–5.2)
SODIUM SERPL-SCNC: 140 MMOL/L (ref 133–144)
TROPONIN I SERPL-MCNC: <0.015 UG/L (ref 0–0.04)
WBC # BLD AUTO: 9.8 10E9/L (ref 4–11)

## 2020-03-06 PROCEDURE — 36415 COLL VENOUS BLD VENIPUNCTURE: CPT | Performed by: PHYSICIAN ASSISTANT

## 2020-03-06 PROCEDURE — T1013 SIGN LANG/ORAL INTERPRETER: HCPCS | Mod: U3

## 2020-03-06 PROCEDURE — 40000894 ZZH STATISTIC OT IP EVAL DEFER

## 2020-03-06 PROCEDURE — 84484 ASSAY OF TROPONIN QUANT: CPT | Performed by: PHYSICIAN ASSISTANT

## 2020-03-06 PROCEDURE — 80048 BASIC METABOLIC PNL TOTAL CA: CPT | Performed by: PHYSICIAN ASSISTANT

## 2020-03-06 PROCEDURE — 97530 THERAPEUTIC ACTIVITIES: CPT | Mod: GP

## 2020-03-06 PROCEDURE — 93306 TTE W/DOPPLER COMPLETE: CPT

## 2020-03-06 PROCEDURE — 85027 COMPLETE CBC AUTOMATED: CPT | Performed by: PHYSICIAN ASSISTANT

## 2020-03-06 PROCEDURE — 97161 PT EVAL LOW COMPLEX 20 MIN: CPT | Mod: GP

## 2020-03-06 PROCEDURE — G0378 HOSPITAL OBSERVATION PER HR: HCPCS

## 2020-03-06 PROCEDURE — 25000132 ZZH RX MED GY IP 250 OP 250 PS 637: Performed by: PHYSICIAN ASSISTANT

## 2020-03-06 PROCEDURE — 93306 TTE W/DOPPLER COMPLETE: CPT | Mod: 26 | Performed by: INTERNAL MEDICINE

## 2020-03-06 PROCEDURE — 99220 ZZC INITIAL OBSERVATION CARE,LEVL III: CPT | Mod: 25 | Performed by: EMERGENCY MEDICINE

## 2020-03-06 RX ADMIN — ATORVASTATIN CALCIUM 80 MG: 40 TABLET, FILM COATED ORAL at 08:42

## 2020-03-06 RX ADMIN — LOSARTAN POTASSIUM 100 MG: 100 TABLET, FILM COATED ORAL at 08:42

## 2020-03-06 RX ADMIN — ASPIRIN 81 MG: 81 TABLET, COATED ORAL at 08:42

## 2020-03-06 NOTE — PLAN OF CARE
Discharge Planner PT   Patient plan for discharge: home with A from family   Current status:  VSS on RA.  Supine to sit with IND.  STS with IND. Does move slow but safely.  Family reports she is moving more slowly than normal, pt agrees.  Ambulates 25ft x2; 1 reps with FWW and 1 without.  Demonstrates difficulty maneuvering FWW despite vs and manual cues to steer.   Pt is safe to discharge home with use of 4WW and OP PT in order to improve functional IND. Family and pt on board.   Barriers to return to prior living situation: falls risk, weakness   Recommendations for discharge: resumption of A from family near 24hrs/day, use of 4WW and OP PT.    Rationale for recommendations: OP PT in order to progress IND mobility with walker.        Entered by: Rufino Bustamante 03/06/2020 11:51 AM

## 2020-03-06 NOTE — PLAN OF CARE
Outpatient/Observation goals to be met before discharge home:    - Diagnostic tests and consults completed and resulted- Not met   - No further episodes of syncope and any new arrhythmia addressed with controlled heart rates- Met   - Vital signs normal or at patient baseline and orthostatic vitals are normal and patient not lightheaded with standing- Met   - Tolerating oral intake to maintain hydration- Met   - Safe disposition plan has been identified- Not met     *Nurse to notify provider when observation goals have been met and patient is ready for discharge.

## 2020-03-06 NOTE — ED NOTES
[facility], [place]   ED Nurse to Floor Handoff     Ariana Barnes is a 80 year old female who speaks Swazi and lives with family members,  in a home  They arrived in the ED by ambulance from home    ED Chief Complaint: Generalized Weakness (In clinic with gobal weakness. Vitals stable )    ED Dx;   Final diagnoses:   Near syncope   Transient hypotension         Needed?: Yes    Allergies:   Allergies   Allergen Reactions     Dicyclomine Unknown and Dizziness     Started at [facility] 10/17; d/c'd shortly after due to SEs     Hydrochlorothiazide Unknown     Stopped taking due to abdominal pain     Isosorbide Other (See Comments)     Severe leg pain  Leg pain  Severe leg pain     .  Past Medical Hx:   Past Medical History:   Diagnosis Date     Dementia (H)      Depressive disorder      Diabetes mellitus (H)      Hypertension       Baseline Mental status: WDL  Current Mental Status changes: at basesline    Infection present or suspected this encounter: no  Sepsis suspected: No  Isolation type: No active isolations     Activity level - Baseline/Home:  Independent and Stand with Assist  Activity Level - Current:   Stand with Assist    Bariatric equipment needed?: No    In the ED these meds were given:   Medications   0.9% sodium chloride BOLUS (1,000 mLs Intravenous New Bag 3/5/20 1601)   sodium chloride 0.9% infusion ( Intravenous New Bag 3/5/20 8685)       Drips running?  No    Home pump  No    Current LDAs  Peripheral IV 01/30/18 Right (Active)   Number of days: 765       Peripheral IV 03/05/20 Right Lower forearm (Active)   Number of days: 0       Labs results:   Labs Ordered and Resulted from Time of ED Arrival Up to the Time of Departure from the ED   ROUTINE UA WITH MICROSCOPIC REFLEX TO CULTURE - Abnormal; Notable for the following components:       Result Value    Glucose Urine 300 (*)     Bacteria Urine Few (*)     Mucous Urine Present (*)     Hyaline Casts 3 (*)     All  other components within normal limits   CBC WITH PLATELETS DIFFERENTIAL - Abnormal; Notable for the following components:    WBC 13.6 (*)     Absolute Neutrophil 10.2 (*)     All other components within normal limits   COMPREHENSIVE METABOLIC PANEL - Abnormal; Notable for the following components:    Potassium 5.6 (*)     Glucose 258 (*)     All other components within normal limits   TROPONIN I   CAPILLARY BLOOD COLLECTION       Imaging Studies:   Recent Results (from the past 24 hour(s))   XR Chest 2 Views    Narrative    CHEST TWO VIEWS 3/5/2020 2:25 PM     HISTORY:  Near syncope.    COMPARISON:  August 11, 2016       Impression    IMPRESSION:  There are no acute infiltrates. The cardiac silhouette is  not enlarged. Pulmonary vasculature is unremarkable.     BARBARA DIAZ MD       Recent vital signs:   /79   Pulse 71   Temp 97.4  F (36.3  C)   Resp 16   Wt 64.3 kg (141 lb 12.8 oz)   SpO2 100%   BMI 28.64 kg/m              Cardiac Rhythm: Normal Sinus  Pt needs tele? No  Skin/wound Issues: None    Code Status: Full Code    Pain control: good    Nausea control: good    Abnormal labs/tests/findings requiring intervention:     Family present during ED course? Yes   Family Comments/Social Situation comments: family at bedside    Tasks needing completion: None    Codie Wei RN  [facility] -- [phone] [place] ED  [phone] [facility] ED

## 2020-03-06 NOTE — PLAN OF CARE
Discharge Planner OT   Patient plan for discharge: -  Current status: order received. Per discussion with PT, no acute OT needs indicated. Pt has 24/7 support from family and 4 hours of PCA services. Will complete OT orders  Barriers to return to prior living situation: defer to PT  Recommendations for discharge: defer to PT  Rationale for recommendations: pt admitted under observation status and LOS anticipated to be short, per discussion with PT, no acute OT needs identified as pt has family support at home. Will complete orders.        Entered by: Johnna Hubbard 03/06/2020 9:49 AM

## 2020-03-06 NOTE — PLAN OF CARE
"Observation Goals:    - Diagnostic tests and consults completed and resulted: not met; echo to be completed    - No further episodes of syncope and any new arrhythmia addressed with controlled heart rates: met    - Vital signs normal or at patient baseline and orthostatic vitals are normal and patient not lightheaded with standing: met    - Tolerating oral intake to maintain hydration: met    - Safe disposition plan has been identified: not met    BP (!) 152/73   Pulse 90   Temp 98.3  F (36.8  C)   Resp 16   Ht 1.499 m (4' 11\")   Wt 60.3 kg (133 lb)   SpO2 98%   BMI 26.86 kg/m      "

## 2020-03-06 NOTE — PLAN OF CARE
"Observation Goals:     - Diagnostic tests and consults completed and resulted: met    - No further episodes of syncope and any new arrhythmia addressed with controlled heart rates : met   - Vital signs normal or at patient baseline and orthostatic vitals are normal and patient not lightheaded with standing: met    - Tolerating oral intake to maintain hydration: met    - Safe disposition plan has been identified: met     BP (!) 158/71   Pulse 79   Temp 98.3  F (36.8  C)   Resp 16   Ht 1.499 m (4' 11\")   Wt 60.3 kg (133 lb)   SpO2 98%   BMI 26.86 kg/m      "

## 2020-03-06 NOTE — PROGRESS NOTES
Care Coordinator - Discharge Planning    Admission Date/Time:  3/5/2020  Attending MD:  Manish Lockett MD     Data  Chart reviewed, discussed with interdisciplinary team.   Patient was admitted for:   1. Near syncope    2. Transient hypotension         Assessment   Concerns with insurance coverage for discharge needs: None.  Current Living Situation: Patient lives with family.  Support System: Supportive and Involved  Services Involved: PCA  Transportation at Discharge: Car and Family or friend will provide  Transportation to Medical Appointments:    - Name of caregiver: Self and family  Barriers to Discharge: Medical stability    Patient admitted with syncope.   Pt status reviewed/discussed during care team rounds.  PT has cleared pt to discharge to home with OP therapy.  Pt flagged as needing the MOON.    Met with pt, her son, daughter and .  introduced RNCC role.  Pt lives with family and they are able to provide 24/7 support.  Pt has 4 hours of PCA services per day.   No concerns noted regarding anticipated plan for discharge.  SOL form reviewed/signed and copy given to patient.       Coordination of Care and Referrals: Provided patient/family with options for PCA.      Plan  Anticipated Discharge Date:  TBD  Anticipated Discharge Plan:  TBD    Maryam Lester RN BSN, PHN RN Care Coordinator  Internal Medicine   [phone]  Pager: [phone]  Weekend RN Care Coordinator job code * * * [phone]  [facility] [place]  3/6/2020 9:47 AM

## 2020-03-06 NOTE — DISCHARGE SUMMARY
Discharge Summary    Ariana Barnes MRN# [id number]   YOB: 1940 Age: 80 year old     Date of Admission:  3/5/2020  Date of Discharge:  3/6/2020 12:34 PM  Admitting Physician:  Manish Lockett MD  Discharge Physician:  ROBERT CAT  Discharging Service:  Emergency Department Observation Unit     Primary Provider: [facility]), [facility]          Discharge Diagnosis:   Near syncope  Hypoglycemia              Discharge Disposition:   Discharged to home           Condition on Discharge:   Discharge condition: Stable   Code status on discharge: Full Code           Procedures:   Cardiology procedures perfromed:   ECHO             Discharge Medications:     Current Discharge Medication List      CONTINUE these medications which have NOT CHANGED    Details   acetaminophen (TYLENOL) 325 MG tablet Take 325-650 mg by mouth every 6 hours as needed       aspirin 81 MG tablet Take 81 mg by mouth daily       atorvastatin (LIPITOR) 40 MG tablet Take 80 mg by mouth daily       CALCIUM PO Take 1 tablet by mouth daily       GLIPIZIDE PO Dose unknown      losartan (COZAAR) 100 MG tablet Take 100 mg by mouth daily      multivitamin, therapeutic with minerals (THERA-VIT-M) TABS Take 1 tablet by mouth daily      vitamin D3 (CHOLECALCIFEROL) 2000 units (50 mcg) tablet Take 2,000 Units by mouth daily                    Consultations:   No consultations were requested during this admission             Brief History of Illness:   Ariana Barnes is a 80 year old female with a history of HTN, DM2 who presented to the ED with possible presyncope, weakness and somnolence during outpatient clinic visit.  She reportedly had been fasting for an 11:00 am appointment for labs, and was observed in the clinic to have difficulty walking due to feeling weak.  Per report her blood pressure was taken and was 65/38.  It improved to the 90's without IVF at the clinic.  She did not lose consciousness but told her  "daughter she felt like she was dying.  Of note she had taken her medications that morning including Losartan.  She was transported to the ED for further evaluation.      In the ED she appeared dry and improved with IVF.  Troponin was negative and EKG was non-ischemic.       She was subsequently admitted to observation for cardiac telemetry, resting echo , and serial troponin.          Hospital Course:   1. Symptomatic hypotension: On admission to the observation unit the patient was stable.  She was seen with her daughter at bedside.  She declined hospital-provided .  She still feels a little off, but cannot describe exactly how, and she does feel better than this morning.  She was able to eat and is keeping it down.  The symptoms she had earlier have resolved. Patient did well overnight. No events on telemetry. Her serial troponins are negative. She completed a resting echocardiogram and this was normal. She was evaluated by PT/OT. Patient was found to be mildly deconditioned. PT recommended OP PT for strengthening. Differential includes hypovolemic 2/2 reduced PO/fasting vs hypoglycmeia vs other. Patient HD stable at discharge. Patient and family agreeable to discharge plan   -OP PT     2.DM2.  A1c 7.8 on 3/5/2020.   - Resume Glipizide 5 mg daily   - Monitor BG at home      3. HTN-  - Resume losartan in am.       4.  Leukocytosis.    - Suspect that she was just dry; afebrile without any fever, cough, respiratory issues, GI upset.    - Repeat cbc shows improved leukocytosis at 9.8      5.  Hyperkalemia.  - Noted on initial labs, hemolyzed. K 3.4 at discharge.                 Final Day of Progress before Discharge:       Physical Exam:  Blood pressure (!) 158/71, pulse 79, temperature 98.3  F (36.8  C), resp. rate 16, height 1.499 m (4' 11\"), weight 60.3 kg (133 lb), SpO2 98 %.    EXAM:  Physical Exam   Constitutional: Pt is oriented to person, place, and time.Pt appears well-developed and well-nourished. "   HENT:   Head: Normocephalic and atraumatic.   Eyes: Conjunctivae are normal. Pupils are equal, round, and reactive to light.   Neck: Normal range of motion. Neck supple.   Cardiovascular: Normal rate, regular rhythm, normal heart sounds and intact distal pulses.    Pulmonary/Chest: Effort normal and breath sounds normal. No respiratory distress. Pt has no wheezes. Pt has no rales  Abdominal: Soft. Bowel sounds are normal. Pt exhibits no distension and no mass. No tenderness. Pt has no rebound and no guarding.   Musculoskeletal: Normal range of motion. Pt exhibits no edema.   Neurological: Pt is alert and oriented to person, place, and time. Normal reflexes.   Skin: Skin is warm and dry. No rash noted.   Psychiatric: Pt has a normal mood and affect. Behavior is normal. Judgment and thought content normal.             Data:  All laboratory data reviewed             Significant Results:   None  Results for orders placed or performed during the hospital encounter of 03/05/20   XR Chest 2 Views     Status: None    Narrative    CHEST TWO VIEWS 3/5/2020 2:25 PM     HISTORY:  Near syncope.    COMPARISON:  August 11, 2016       Impression    IMPRESSION:  There are no acute infiltrates. The cardiac silhouette is  not enlarged. Pulmonary vasculature is unremarkable.     BARBARA DIAZ MD   UA with Microscopic reflex to Culture     Status: Abnormal   Result Value Ref Range    Color Urine Yellow     Appearance Urine Clear     Glucose Urine 300 (A) NEG^Negative mg/dL    Bilirubin Urine Negative NEG^Negative    Ketones Urine Negative NEG^Negative mg/dL    Specific Gravity Urine 1.009 1.003 - 1.035    Blood Urine Negative NEG^Negative    pH Urine 6.5 5.0 - 7.0 pH    Protein Albumin Urine Negative NEG^Negative mg/dL    Urobilinogen mg/dL Normal 0.0 - 2.0 mg/dL    Nitrite Urine Negative NEG^Negative    Leukocyte Esterase Urine Negative NEG^Negative    Source Midstream Urine     WBC Urine 3 0 - 5 /HPF    RBC Urine 1 0 - 2 /HPF     Bacteria Urine Few (A) NEG^Negative /HPF    Squamous Epithelial /HPF Urine 1 0 - 1 /HPF    Mucous Urine Present (A) NEG^Negative /LPF    Hyaline Casts 3 (H) 0 - 2 /LPF   CBC with platelets differential     Status: Abnormal   Result Value Ref Range    WBC 13.6 (H) 4.0 - 11.0 10e9/L    RBC Count 4.72 3.8 - 5.2 10e12/L    Hemoglobin 13.8 11.7 - 15.7 g/dL    Hematocrit 40.7 35.0 - 47.0 %    MCV 86 78 - 100 fl    MCH 29.2 26.5 - 33.0 pg    MCHC 33.9 31.5 - 36.5 g/dL    RDW 12.9 10.0 - 15.0 %    Platelet Count 277 150 - 450 10e9/L    Diff Method Automated Method     % Neutrophils 75.4 %    % Lymphocytes 19.9 %    % Monocytes 2.9 %    % Eosinophils 1.3 %    % Basophils 0.2 %    % Immature Granulocytes 0.3 %    Nucleated RBCs 0 0 /100    Absolute Neutrophil 10.2 (H) 1.6 - 8.3 10e9/L    Absolute Lymphocytes 2.7 0.8 - 5.3 10e9/L    Absolute Monocytes 0.4 0.0 - 1.3 10e9/L    Absolute Eosinophils 0.2 0.0 - 0.7 10e9/L    Absolute Basophils 0.0 0.0 - 0.2 10e9/L    Abs Immature Granulocytes 0.0 0 - 0.4 10e9/L    Absolute Nucleated RBC 0.0    Comprehensive metabolic panel     Status: Abnormal   Result Value Ref Range    Sodium 137 133 - 144 mmol/L    Potassium 5.6 (H) 3.4 - 5.3 mmol/L    Chloride 105 94 - 109 mmol/L    Carbon Dioxide 23 20 - 32 mmol/L    Anion Gap 9 3 - 14 mmol/L    Glucose 258 (H) 70 - 99 mg/dL    Urea Nitrogen 20 7 - 30 mg/dL    Creatinine 0.63 0.52 - 1.04 mg/dL    GFR Estimate 85 >60 mL/min/[1.73_m2]    GFR Estimate If Black >90 >60 mL/min/[1.73_m2]    Calcium 9.7 8.5 - 10.1 mg/dL    Bilirubin Total 0.5 0.2 - 1.3 mg/dL    Albumin 3.5 3.4 - 5.0 g/dL    Protein Total 8.1 6.8 - 8.8 g/dL    Alkaline Phosphatase 73 40 - 150 U/L    ALT 16 0 - 50 U/L    AST 23 0 - 45 U/L   Troponin I     Status: None   Result Value Ref Range    Troponin I ES <0.015 0.000 - 0.045 ug/L   Capillary Blood Collection     Status: None   Result Value Ref Range    Capillary Blood Collection Capillary collection performed    CBC with platelets      Status: None   Result Value Ref Range    WBC 9.8 4.0 - 11.0 10e9/L    RBC Count 4.13 3.8 - 5.2 10e12/L    Hemoglobin 11.9 11.7 - 15.7 g/dL    Hematocrit 37.0 35.0 - 47.0 %    MCV 90 78 - 100 fl    MCH 28.8 26.5 - 33.0 pg    MCHC 32.2 31.5 - 36.5 g/dL    RDW 13.1 10.0 - 15.0 %    Platelet Count 274 150 - 450 10e9/L   Basic metabolic panel     Status: Abnormal   Result Value Ref Range    Sodium 140 133 - 144 mmol/L    Potassium 3.4 3.4 - 5.3 mmol/L    Chloride 110 (H) 94 - 109 mmol/L    Carbon Dioxide 24 20 - 32 mmol/L    Anion Gap 6 3 - 14 mmol/L    Glucose 188 (H) 70 - 99 mg/dL    Urea Nitrogen 18 7 - 30 mg/dL    Creatinine 0.67 0.52 - 1.04 mg/dL    GFR Estimate 83 >60 mL/min/[1.73_m2]    GFR Estimate If Black >90 >60 mL/min/[1.73_m2]    Calcium 8.6 8.5 - 10.1 mg/dL   Troponin I     Status: None   Result Value Ref Range    Troponin I ES <0.015 0.000 - 0.045 ug/L   EKG 12-lead, tracing only     Status: None   Result Value Ref Range    Interpretation ECG Click View Image link to view waveform and result    Echocardiogram Complete     Status: None    Narrative    [id number]  [id number]  [id number]  [id number]^RIVKA^MICHA           [facility],[place]  Echocardiography Laboratory  [address] [place] [id number]     Name: CLARKE AMES  MRN: [id number]  : 1940  Study Date: 2020 10:32 AM  Age: 80 yrs  Gender: Female  Patient Location: [facility]  Reason For Study: Syncope  Ordering Physician: MICHA TINEO  Performed By: Peter Miller RDCS     BSA: 1.6 m2  Height: 59 in  Weight: 133 lb  HR: 85  BP: 160/87 mmHg  _____________________________________________________________________________  __        Procedure  Complete Portable Echo Adult.  _____________________________________________________________________________  __        Interpretation Summary  Global and regional left ventricular function is hyperkinetic with an EF of  65-70%.  Global right ventricular function is  normal.  No significant valvular abnormalities were noted.     There is no prior study for direct comparison.  _____________________________________________________________________________  __        Left Ventricle  Global and regional left ventricular function is hyperkinetic with an EF of  65-70%. Left ventricular size is normal. Relative wall thickness is increased  consistent with concentric remodeling. Left ventricular diastolic function is  indeterminate. No regional wall motion abnormalities are seen.     Right Ventricle  The right ventricle is normal size. Global right ventricular function is  normal. Right ventricular wall thickness is normal.     Atria  Both atria appear normal. The atrial septum is intact as assessed by color  Doppler .     Mitral Valve  The mitral valve is normal.        Aortic Valve  Aortic valve is normal in structure and function. The aortic valve is  tricuspid.     Tricuspid Valve  The tricuspid valve is normal. The peak velocity of the tricuspid regurgitant  jet is not obtainable. Pulmonary artery systolic pressure cannot be assessed.     Pulmonic Valve  The pulmonic valve is normal.     Vessels  The aorta root is normal. The thoracic aorta is normal. The pulmonary artery  is normal. IVC diameter <2.1 cm collapsing >50% with sniff suggests a normal  RA pressure of 3 mmHg.     Pericardium  No pericardial effusion is present.        Compared to Previous Study  There is no prior study for direct comparison.     Attestation  I have personally viewed the imaging and agree with the interpretation and  report as documented by the fellow, Kat Vargas, and/or edited by me.  _____________________________________________________________________________  __     MMode/2D Measurements & Calculations  IVSd: 1.4 cm  LVIDd: 3.7 cm  LVIDs: 2.5 cm  LVPWd: 1.3 cm  FS: 33.2 %  LV mass(C)d: 174.2 grams  LV mass(C)dI: 112.3 grams/m2  asc Aorta Diam: 3.0 cm  RWT: 0.68        Doppler Measurements &  Calculations  MV E max erickson: 59.9 cm/sec  MV A max erickson: 96.1 cm/sec  MV E/A: 0.62  MV dec slope: 346.6 cm/sec2  MV dec time: 0.17 sec  PA acc time: 0.10 sec  E/E' av.8  Lateral E/e': 10.3     Medial E/e': 9.2     _____________________________________________________________________________  __           Report approved by: Brien MEJIA 2020 11:44 AM         Recent Results (from the past 48 hour(s))   XR Chest 2 Views    Narrative    CHEST TWO VIEWS 3/5/2020 2:25 PM     HISTORY:  Near syncope.    COMPARISON:  2016       Impression    IMPRESSION:  There are no acute infiltrates. The cardiac silhouette is  not enlarged. Pulmonary vasculature is unremarkable.     BARBARA DIAZ MD   Echocardiogram Complete    Narrative    [id number]  [id number]  [id number]  [id number]^RIVKA^MICHA           [facility],[place]  Echocardiography Laboratory  [address] [place] [id number]     Name: CLARKE AMES  MRN: [id number]  : 1940  Study Date: 2020 10:32 AM  Age: 80 yrs  Gender: Female  Patient Location: [facility]  Reason For Study: Syncope  Ordering Physician: MICHA TINEO  Performed By: Peter Miller RDCS     BSA: 1.6 m2  Height: 59 in  Weight: 133 lb  HR: 85  BP: 160/87 mmHg  _____________________________________________________________________________  __        Procedure  Complete Portable Echo Adult.  _____________________________________________________________________________  __        Interpretation Summary  Global and regional left ventricular function is hyperkinetic with an EF of  65-70%.  Global right ventricular function is normal.  No significant valvular abnormalities were noted.     There is no prior study for direct comparison.  _____________________________________________________________________________  __        Left Ventricle  Global and regional left ventricular function is hyperkinetic with an EF of  65-70%. Left ventricular size is  normal. Relative wall thickness is increased  consistent with concentric remodeling. Left ventricular diastolic function is  indeterminate. No regional wall motion abnormalities are seen.     Right Ventricle  The right ventricle is normal size. Global right ventricular function is  normal. Right ventricular wall thickness is normal.     Atria  Both atria appear normal. The atrial septum is intact as assessed by color  Doppler .     Mitral Valve  The mitral valve is normal.        Aortic Valve  Aortic valve is normal in structure and function. The aortic valve is  tricuspid.     Tricuspid Valve  The tricuspid valve is normal. The peak velocity of the tricuspid regurgitant  jet is not obtainable. Pulmonary artery systolic pressure cannot be assessed.     Pulmonic Valve  The pulmonic valve is normal.     Vessels  The aorta root is normal. The thoracic aorta is normal. The pulmonary artery  is normal. IVC diameter <2.1 cm collapsing >50% with sniff suggests a normal  RA pressure of 3 mmHg.     Pericardium  No pericardial effusion is present.        Compared to Previous Study  There is no prior study for direct comparison.     Attestation  I have personally viewed the imaging and agree with the interpretation and  report as documented by the fellow, Kat Vargas, and/or edited by me.  _____________________________________________________________________________  __     MMode/2D Measurements & Calculations  IVSd: 1.4 cm  LVIDd: 3.7 cm  LVIDs: 2.5 cm  LVPWd: 1.3 cm  FS: 33.2 %  LV mass(C)d: 174.2 grams  LV mass(C)dI: 112.3 grams/m2  asc Aorta Diam: 3.0 cm  RWT: 0.68        Doppler Measurements & Calculations  MV E max erickson: 59.9 cm/sec  MV A max erickson: 96.1 cm/sec  MV E/A: 0.62  MV dec slope: 346.6 cm/sec2  MV dec time: 0.17 sec  PA acc time: 0.10 sec  E/E' av.8  Lateral E/e': 10.3     Medial E/e': 9.2     _____________________________________________________________________________  __           Report approved by: ILKNUR  Brien CORADO 03/06/2020 11:44 AM                   Pending Results:   Unresulted Labs Ordered in the Past 30 Days of this Admission     No orders found for last 31 day(s).                  Discharge Instructions and Follow-Up:     Discharge Procedure Orders   PHYSICAL THERAPY REFERRAL   Standing Status: Future   Referral Priority: Routine Referral Type: Rehab Therapy Physical Therapy   Number of Visits Requested: 1     Reason for your hospital stay   Order Comments: Near syncope     Follow Up and recommended labs and tests   Order Comments: Follow up with primary in 1 week     Activity   Order Comments: Your activity upon discharge: activity as tolerated     Order Specific Question Answer Comments   Is discharge order? Yes      When to contact your care team   Order Comments: Please go to your nearest emergency room If you were to have a change in the type of chest pain i.e more severe, lasting longer or radiating to your shoulder, arm, neck, jaw or back, shortness of breath or increased pain with breathing, coughing up blood, feel dizzy or lightheaded, or notice swelling in one leg.     Discharge Instructions   Order Comments: You were admitted to the observation unit for evaluation of syncope (fainting)  Your EKG was normal. Troponins (a lab test to check if there was damage to the heart tissue) were checked and these were negative. Chest x-ray was normal.  You underwent an echocardiogram test and this was normal. I recommend continuing your home medications and it will be very important to follow up with your primary care doctor early next week or sooner if your symptoms return.     Full Code     Order Specific Question Answer Comments   Code status determined by: Discussion with patient/legal decision maker      Diet   Order Comments: Follow this diet upon discharge: Regular     Order Specific Question Answer Comments   Is discharge order? Yes           Attestation:  Vanesa Parker PA-C.

## 2020-03-06 NOTE — PLAN OF CARE
DC instructions given to pt and children, verbalized understanding.  All belongings with pt, IV DC'd and documented.     Pt discharged to second floor for discharge home via wheelchair.

## 2020-03-06 NOTE — H&P
ED OBSERVATION HISTORY & PHYSICAL    Admission Date: 3/5/20  Attending Physician: Dr. Lockett  NP/PA: Josette Chaudhary PA-C    REASON FOR ADMISSION:   Chief Complaint   Patient presents with     Generalized Weakness     In clinic with gobal weakness. Vitals stable          HPI:    Ariana Barnes is a 80 year old female with a history of HTN, DM2 who presented to the ED with possible presyncope, weakness and somnolence during outpatient clinic visit.  She reportedly had been fasting for an 11:00 am appointment for labs, and was observed in the clinic to have difficulty walking due to feeling weak.  Per report her blood pressure was taken and was 65/38.  It improved to the 90's without IVF at the clinic.  She did not lose consciousness but told her daughter she felt like she was dying.  Of note she had taken her medications that morning including Losartan.  She was transported to the ED for further evaluation.     In the ED she appeared dry and improved with IVF.  Troponin was negative and EKG was non-ischemic.      On admission to the observation unit the patient was stable.  She is seen with her daughter at bedside.  She declines hospital-provided .  She still feels a little off, but cannot describe exactly how, and she does feel better than this morning.  She was able to eat and is keeping it down.  The symptoms she had earlier have resolved.      ROS:    CONSTITUTIONAL: Generally feels well. Denies fever, chills, sweats, fatigue, weakness, weight loss, or appetite changes.  SKIN: Denies rash, itching, bruising, new lumps or bumps, ecchymosis, hair changes or nail changes.  EYES: Denies visual changes, blurred vision, double vision, or eye pain  EARS/NOSE/THROAT: Denies hearing loss, tinnitus, sinus pressure/drainage, PND, nasal congestion, runny nose, epistaxis, sore throat/mouth pain, change in taste, ear pain, bleeding gums, or hoarseness.  RESPIRATORY: Denies dyspnea at rest or with activity, cough, or  hemoptysis.  CARDIOVASCULAR: Denies palpitations, chest pain/pressure, orthopnea, edema or open areas on extremities.  GASTROINTESTINAL: Good appetite and PO intake. Denies dysphagia, heartburn, nausea, vomiting, abdominal pain, constipation, or diarrhea.  GENITOURINARY: Denies dysuria, frequency, urgency, hesitancy, hematuria, or incontinence  MUSCULOSKELTAL: Denies muscle/joint pain and weakness  NEUROLOGIC: Denies headaches, dizziness, numbness or tingling of hands and feet, confusion, memory changes, lightheadedness/dizziness or difficulties with balance.  PSYCHIATRIC: Denies anxiety, depression, mental status changes, or change in mood.  HEME/LYMPH: Denies active bleeding, swollen nodes  VASCULAR ACCESS: Denies pain, redness, or discharge.    ROS negative other than the symptoms noted above.    History:    Past Medical History:   Diagnosis Date     Dementia (H)      Depressive disorder      Diabetes mellitus (H)      Hypertension        History reviewed. No pertinent surgical history.    History reviewed. No pertinent family history.    Social History     Socioeconomic History     Marital status:      Spouse name: Not on file     Number of children: Not on file     Years of education: Not on file     Highest education level: Not on file   Occupational History     Not on file   Social Needs     Financial resource strain: Not on file     Food insecurity:     Worry: Not on file     Inability: Not on file     Transportation needs:     Medical: Not on file     Non-medical: Not on file   Tobacco Use     Smoking status: Never Smoker     Smokeless tobacco: Never Used   Substance and Sexual Activity     Alcohol use: No     Drug use: No     Sexual activity: Not on file   Lifestyle     Physical activity:     Days per week: Not on file     Minutes per session: Not on file     Stress: Not on file   Relationships     Social connections:     Talks on phone: Not on file     Gets together: Not on file     Attends  Jain service: Not on file     Active member of club or organization: Not on file     Attends meetings of clubs or organizations: Not on file     Relationship status: Not on file     Intimate partner violence:     Fear of current or ex partner: Not on file     Emotionally abused: Not on file     Physically abused: Not on file     Forced sexual activity: Not on file   Other Topics Concern     Not on file   Social History Narrative     Not on file       No current facility-administered medications on file prior to encounter.   acetaminophen (TYLENOL) 325 MG tablet, Take 325-650 mg by mouth every 6 hours as needed   aspirin 81 MG tablet, Take 81 mg by mouth daily   atorvastatin (LIPITOR) 40 MG tablet, Take 80 mg by mouth daily   CALCIUM PO, Take 1 tablet by mouth daily   GLIPIZIDE PO, Dose unknown  losartan (COZAAR) 100 MG tablet, Take 100 mg by mouth daily  multivitamin, therapeutic with minerals (THERA-VIT-M) TABS, Take 1 tablet by mouth daily  vitamin D3 (CHOLECALCIFEROL) 2000 units (50 mcg) tablet, Take 2,000 Units by mouth daily         Exam:  Vitals:  B/P: 153/92, T: 99.1, P: 89, R: 16    All vital signs were reviewed.  GENERAL APPEARENCE: Pleasant, generally appears well, A/O x4. NAD.  SKIN: Clean, dry, and intact without visible lesions, rash, jaundice, cyanosis, erythema, ecchymoses to exposed areas.  HEENT: NCAT w/out masses, lesions, or abnormalities. Sclera anicteric, PERRLA, EOMI.  Oral mucosa pink and dry without erythema, exudate, lesions, ulcerations, or thrush. Teeth and gums normal.    NECK: Supple, no masses. No jugular venous distention.   CARDIOVASCULAR: S1, S2 RRR. No murmurs, rubs, or gallops.   RESPIRATORY: Respiratory effort WNL. CTA  bilaterally without crackles/rales/wheeze   GI: Active BS in all 4 quadrants. Abdomen soft and non-tender. No masses or hepatosplenomegaly.  : Deferred  MUSCULOSKELETAL: Gait not assessed. Strength 5/5 in major muscle groups of bilateral UE and LE.   Extremities normal, no gross deformities noted, non-tender to palpation.   PV: 2+ bilateral radial and pedal pulses. No edema noted.   NEURO: CN II-XII grossly intact. Speech normal. Appropriate throughout interview.   Sensation grossly WNL. Finger to nose and rapid alternating movements WDL.  HEME/LYMPH: No visible bleeding.  PSYCHIATRIC: Mentation and affect appear normal  VASCULAR ACCESS: CDI without erythema or discharge. Non-tender.    Data:    Results for orders placed or performed during the hospital encounter of 03/05/20   XR Chest 2 Views     Status: None    Narrative    CHEST TWO VIEWS 3/5/2020 2:25 PM     HISTORY:  Near syncope.    COMPARISON:  August 11, 2016       Impression    IMPRESSION:  There are no acute infiltrates. The cardiac silhouette is  not enlarged. Pulmonary vasculature is unremarkable.     BARBARA DIAZ MD   UA with Microscopic reflex to Culture     Status: Abnormal   Result Value Ref Range    Color Urine Yellow     Appearance Urine Clear     Glucose Urine 300 (A) NEG^Negative mg/dL    Bilirubin Urine Negative NEG^Negative    Ketones Urine Negative NEG^Negative mg/dL    Specific Gravity Urine 1.009 1.003 - 1.035    Blood Urine Negative NEG^Negative    pH Urine 6.5 5.0 - 7.0 pH    Protein Albumin Urine Negative NEG^Negative mg/dL    Urobilinogen mg/dL Normal 0.0 - 2.0 mg/dL    Nitrite Urine Negative NEG^Negative    Leukocyte Esterase Urine Negative NEG^Negative    Source Midstream Urine     WBC Urine 3 0 - 5 /HPF    RBC Urine 1 0 - 2 /HPF    Bacteria Urine Few (A) NEG^Negative /HPF    Squamous Epithelial /HPF Urine 1 0 - 1 /HPF    Mucous Urine Present (A) NEG^Negative /LPF    Hyaline Casts 3 (H) 0 - 2 /LPF   CBC with platelets differential     Status: Abnormal   Result Value Ref Range    WBC 13.6 (H) 4.0 - 11.0 10e9/L    RBC Count 4.72 3.8 - 5.2 10e12/L    Hemoglobin 13.8 11.7 - 15.7 g/dL    Hematocrit 40.7 35.0 - 47.0 %    MCV 86 78 - 100 fl    MCH 29.2 26.5 - 33.0 pg    MCHC 33.9 31.5 -  36.5 g/dL    RDW 12.9 10.0 - 15.0 %    Platelet Count 277 150 - 450 10e9/L    Diff Method Automated Method     % Neutrophils 75.4 %    % Lymphocytes 19.9 %    % Monocytes 2.9 %    % Eosinophils 1.3 %    % Basophils 0.2 %    % Immature Granulocytes 0.3 %    Nucleated RBCs 0 0 /100    Absolute Neutrophil 10.2 (H) 1.6 - 8.3 10e9/L    Absolute Lymphocytes 2.7 0.8 - 5.3 10e9/L    Absolute Monocytes 0.4 0.0 - 1.3 10e9/L    Absolute Eosinophils 0.2 0.0 - 0.7 10e9/L    Absolute Basophils 0.0 0.0 - 0.2 10e9/L    Abs Immature Granulocytes 0.0 0 - 0.4 10e9/L    Absolute Nucleated RBC 0.0    Comprehensive metabolic panel     Status: Abnormal   Result Value Ref Range    Sodium 137 133 - 144 mmol/L    Potassium 5.6 (H) 3.4 - 5.3 mmol/L    Chloride 105 94 - 109 mmol/L    Carbon Dioxide 23 20 - 32 mmol/L    Anion Gap 9 3 - 14 mmol/L    Glucose 258 (H) 70 - 99 mg/dL    Urea Nitrogen 20 7 - 30 mg/dL    Creatinine 0.63 0.52 - 1.04 mg/dL    GFR Estimate 85 >60 mL/min/[1.73_m2]    GFR Estimate If Black >90 >60 mL/min/[1.73_m2]    Calcium 9.7 8.5 - 10.1 mg/dL    Bilirubin Total 0.5 0.2 - 1.3 mg/dL    Albumin 3.5 3.4 - 5.0 g/dL    Protein Total 8.1 6.8 - 8.8 g/dL    Alkaline Phosphatase 73 40 - 150 U/L    ALT 16 0 - 50 U/L    AST 23 0 - 45 U/L   Troponin I     Status: None   Result Value Ref Range    Troponin I ES <0.015 0.000 - 0.045 ug/L   Capillary Blood Collection     Status: None   Result Value Ref Range    Capillary Blood Collection Capillary collection performed    EKG 12-lead, tracing only     Status: None   Result Value Ref Range    Interpretation ECG Click View Image link to view waveform and result              EKG Interpretation:      EKG Number: 1  Interpreted by Josette Chaudhary PA-C  Symptoms at time of EKG: None   Rhythm: Normal sinus   Rate: Normal  Axis: Normal  Ectopy: None  Conduction: Normal  ST Segments/ T Waves: No ST-T wave changes and No acute ischemic changes  Q Waves: None  Comparison to prior: Unchanged from prior  study.    Clinical Impression: normal EKG, unchanged from prior.        Assessment/Plan:  Ariana Barnes is a 80 year old female with a history of DM2, HTN who presented to the ED with episode of symptomatic hypotension in the setting of fasting.  She is admitted to the observation unit.      1. Symptomatic hypotension; differential includes hypovolemic 2/2 reduced PO/fasting vs cardiac cause vs other.  - Admit to observation for cardiac telemetry.  - Resting echo in the morning.  - Repeat Troponin  - Orthostatic vitals.  - PT/OT    2.DM2.    - Diabetic diet.  - Hold oral agents for now.      3. HTN-  - Patient with elevated BP now.    - Resume losartan in am.      4.  Leukocytosis.    - suspect that she is just dry; afebrile without any fever, cough, respiratory issues, GI upset.    - Trend.      5.  Hyperkalemia.  - noted on initial labs, hemolyzed.  - Repeat in AM.      FEN:  -Regular diet as tolerated.  -Monitor BMP and replace electrolytes per protocol    Prophy:  -No VTE prophy as anticipate short observation stay   -Encourage ambulation as tolerated   -for GI prophy  -PRN Senna and Miralax      CODE STATUS:  FULL CODE   DISPOSITION: ED Observation      Josette Chaudhary PA-C  Emergency Department Observation Unit

## 2020-03-06 NOTE — PHARMACY-ADMISSION MEDICATION HISTORY
Admission medication history interview status for the 3/5/2020 admission is complete. See Epic admission navigator for allergy information, pharmacy, prior to admission medications and immunization status.     Medication history interview sources:  Patient's daughter    Changes made to PTA medication list (reason)  Added:   1.) Glipizide PO  2.) Calcium PO  3.) Vitamin D 2000 unit tablet  Deleted:   1.) Glyburide 5mg tablet: take 10mg by mouth daily with breakfast. Discontinued 1 year ago per patient's daughter.  Changed:   1.) Losartan potassium PO: take 100mg by mouth daily. Changed to: losartan 100mg tablet: take 1 tablet by mouth daily.  2.) atorvastatin 40mg tablet: take 1 tablet by mouth daily. Changed to: atorvastatin 40mg tablet: take 2 tablets by mouth daily. Changed per patient's daughter.     Additional medication history information (including reliability of information, actions taken by pharmacist):  1.) Patient's daughter was a good historian. She knew when all medications were last taken but could not confirm the dose of glipizide or the salt form of calcium supplement.  2.) Patient fills outpatient prescriptions at \[facility]\"" pharmacy on [address]. Phone #: [phone].        Prior to Admission medications    Medication Sig Last Dose Taking? Auth Provider   acetaminophen (TYLENOL) 325 MG tablet Take 325-650 mg by mouth every 6 hours as needed  3/4/2020 Yes Reported, Patient   aspirin 81 MG tablet Take 81 mg by mouth daily  3/5/2020 at AM Yes Reported, Patient   atorvastatin (LIPITOR) 40 MG tablet Take 80 mg by mouth daily  3/5/2020 at AM Yes Reported, Patient   CALCIUM PO Take 1 tablet by mouth daily  3/5/2020 at AM Yes Unknown, Entered By History   GLIPIZIDE PO Dose unknown 3/5/2020 at AM Yes Unknown, Entered By History   losartan (COZAAR) 100 MG tablet Take 100 mg by mouth daily 3/5/2020 at AM Yes Unknown, Entered By History   multivitamin, therapeutic with minerals (THERA-VIT-M) TABS  Take 1 tablet by mouth daily 3/5/2020 at AM Yes Reported, Patient   vitamin D3 (CHOLECALCIFEROL) 2000 units (50 mcg) tablet Take 2,000 Units by mouth daily  3/5/2020 at AM Yes Unknown, Entered By History           Medication history completed by: Jared Osborne PD2 Pharmacy Intern

## 2020-03-06 NOTE — PROGRESS NOTES
03/06/20 1100   Quick Adds   Type of Visit Initial PT Evaluation       Present yes   Living Environment   Lives With alone  (but children are avaliable to A near 24 hrs per son and daug)   Living Arrangements house   Home Accessibility no concerns   Transportation Anticipated family or friend will provide   Living Environment Comment Pt lives alone but daughter and son report family is with pt most of day.    Self-Care   Usual Activity Tolerance good   Current Activity Tolerance moderate   Regular Exercise No   Equipment Currently Used at Home none   Activity/Exercise/Self-Care Comment pt does have an SEC and 4WW at home   Functional Level Prior   Ambulation 0-->independent   Transferring 0-->independent   Toileting 0-->independent   Bathing 0-->independent   Communication 0-->understands/communicates without difficulty   Swallowing 0-->swallows foods/liquids without difficulty   Cognition 0 - no cognition issues reported   Fall history within last six months no   Which of the above functional risks had a recent onset or change? ambulation   General Information   Onset of Illness/Injury or Date of Surgery - Date 03/05/20   Referring Physician Josette Chaudhary PA-C   Patient/Family Goals Statement return home    Pertinent History of Current Problem (include personal factors and/or comorbidities that impact the POC) 80 year old female with a history of HTN, DM2 who presented to the ED with possible presyncope, weakness and somnolence during outpatient clinic visit.  She reportedly had been fasting for an 11:00 am appointment for labs, and was observed in the clinic to have difficulty walking due to feeling weak.  Per report her blood pressure was taken and was 65/38.  It improved to the 90's without IVF at the clinic.  She did not lose consciousness but told her daughter she felt like she was dying.  Of note she had taken her medications that morning including Losartan.  She was transported to  "the ED for further evaluation.    Precautions/Limitations fall precautions   Cognitive Status Examination   Orientation orientation to person, place and time   Level of Consciousness alert   Follows Commands and Answers Questions 100% of the time   Personal Safety and Judgment intact   Memory intact   Posture    Posture Not impaired   Range of Motion (ROM)   ROM Comment WFL   Strength   Strength Comments WFL   Bed Mobility   Bed Mobility Comments Supine to sit with IND   Transfer Skills   Transfer Comments IND with STS   Gait   Gait Comments ambulates 25ft x2 slowly with fWW and without AD   Balance   Balance no deficits were identified   General Therapy Interventions   Planned Therapy Interventions gait training;risk factor education;home program guidelines;progressive activity/exercise   Clinical Impression   Criteria for Skilled Therapeutic Intervention yes, treatment indicated   PT Diagnosis impaired functional mobility   Influenced by the following impairments slow gait speed, falls risk   Functional limitations due to impairments IND prolonged mobility   Clinical Presentation Stable/Uncomplicated   Clinical Presentation Rationale clinical judgement   Clinical Decision Making (Complexity) Low complexity   Therapy Frequency Other (see comments)  (1 time eval and treat)   Predicted Duration of Therapy Intervention (days/wks) 1 day   Anticipated Equipment Needs at Discharge front wheeled walker   Anticipated Discharge Disposition Home with Assist;Home with Outpatient Therapy   Risk & Benefits of therapy have been explained Yes   Patient, Family & other staff in agreement with plan of care Yes   [facility] [employer] \"6 Clicks\"   2016, Trustees of [facility], under license to [employer].  All rights reserved.   6 Clicks Short Forms Basic Mobility Inpatient Short Form   [facility] [employer]  \"6 Clicks\" V.2 Basic Mobility Inpatient Short Form   1. Turning from your back to your side while in a flat " bed without using bedrails? 4 - None   2. Moving from lying on your back to sitting on the side of a flat bed without using bedrails? 4 - None   3. Moving to and from a bed to a chair (including a wheelchair)? 3 - A Little   4. Standing up from a chair using your arms (e.g., wheelchair, or bedside chair)? 3 - A Little   5. To walk in hospital room? 3 - A Little   6. Climbing 3-5 steps with a railing? 2 - A Lot   Basic Mobility Raw Score (Score out of 24.Lower scores equate to lower levels of function) 19   Total Evaluation Time   Total Evaluation Time (Minutes) 10

## 2020-03-07 PROCEDURE — 99217 ZZC OBSERVATION CARE DISCHARGE: CPT | Mod: Z6 | Performed by: EMERGENCY MEDICINE

## 2020-05-22 ENCOUNTER — DOCUMENTATION ONLY (OUTPATIENT)
Dept: OTHER | Facility: CLINIC | Age: 80
End: 2020-05-22

## 2021-04-08 ENCOUNTER — OFFICE VISIT (OUTPATIENT)
Dept: NEUROLOGY | Facility: CLINIC | Age: 81
End: 2021-04-08
Payer: COMMERCIAL

## 2021-04-08 VITALS
DIASTOLIC BLOOD PRESSURE: 55 MMHG | HEART RATE: 78 BPM | SYSTOLIC BLOOD PRESSURE: 86 MMHG | WEIGHT: 140 LBS | BODY MASS INDEX: 28.22 KG/M2 | HEIGHT: 59 IN

## 2021-04-08 DIAGNOSIS — R41.9 NEUROCOGNITIVE DISORDER: Primary | ICD-10-CM

## 2021-04-08 PROBLEM — I25.119 CORONARY ARTERY DISEASE INVOLVING NATIVE CORONARY ARTERY WITH ANGINA PECTORIS (H): Status: ACTIVE | Noted: 2017-07-14

## 2021-04-08 PROBLEM — R55 SYNCOPE: Status: RESOLVED | Noted: 2020-03-05 | Resolved: 2021-04-08

## 2021-04-08 PROCEDURE — 99205 OFFICE O/P NEW HI 60 MIN: CPT | Mod: GC | Performed by: PSYCHIATRY & NEUROLOGY

## 2021-04-08 RX ORDER — AMLODIPINE BESYLATE 10 MG/1
TABLET ORAL
COMMUNITY
Start: 2021-03-12

## 2021-04-08 RX ORDER — CARVEDILOL 25 MG/1
12.5 TABLET ORAL 2 TIMES DAILY WITH MEALS
COMMUNITY
Start: 2021-03-12

## 2021-04-08 RX ORDER — SEMAGLUTIDE 1.34 MG/ML
INJECTION, SOLUTION SUBCUTANEOUS
COMMUNITY
Start: 2021-03-12

## 2021-04-08 RX ORDER — ZIPRASIDONE HYDROCHLORIDE 40 MG/1
CAPSULE ORAL
COMMUNITY
Start: 2021-01-31

## 2021-04-08 RX ORDER — CYCLOSPORINE 0 G/ML
SOLUTION/ DROPS OPHTHALMIC; TOPICAL
COMMUNITY
Start: 2021-01-16

## 2021-04-08 ASSESSMENT — MIFFLIN-ST. JEOR: SCORE: 1005.67

## 2021-04-08 NOTE — PROGRESS NOTES
NEUROLOGY CONSULTATION NOTE       [facility] NEUROLOGY [place]  [address] [place] [address]  Tel: [phone]  Fax: [phone]  [email or web address]     Ariana Barnes,  1940, MRN [id number]  PCP: [facility], [phone]  Date: 2021     ASSESSMENT & PLAN     Diagnosis code  1. Neurocognitive disorder   2. Hypotension     Neurocognitive disorder  Patient with likely dementia that is quite progressed with significant impairment in ADL's. Outside labs completed thus far have not revealed obvious etiology. This likely represents Alzheimer dementia, but patient also has risk factors with hypertension and type 2 diabetes for vascular dementia or mixed picture. History of psychiatric diagnosis on antipsychotics for many years with history of tardive dyskinesia does complicate picture somewhat.  - MRI  - Lyme panel   - Methylmalonic acid level   - Homocystine  - Folate  - Vitamin B1  - Vitamin E  - Anticipate starting donepezil pending results     Hypotension  Patient noted to be hypotensive on exam today, but appears clinically stable. Recent adjustment in antihypertensive medications likely contributing. Recommended close follow up with PCP regarding medications.     Thank you again for this referral, please feel free to contact me if you have any questions.      Patient discussed with attending physician, Dr. Charly Taylor, who agrees with above assessment and plan.       Johnna Roberts MD, PGY3          Charly Taylor MD  [facility]  ([facility], [facility]     REASON FOR CONSULTATION Memory Loss and Gait Problem        HISTORY OF PRESENT ILLNESS     We have been requested by Dr. Winnie Vilchis to evaluate Ariana Barnes who is a 81 year old  female for memory loss and gait problem.     Patient has a long-standing issue of memory loss over many years, bu this seems to have been  worsening over the past 6 months and then more significant decline in January that seemed to start after COVID-19 vaccine. She is unable to do many activities of daily living and requires full assistance with dressing, grooming, cooking, finances. She is incontinent of urine at times, but can sometimes toilet with assistance. Family notes that there are times where she even seems to forget how to walk. Her long-term memory is more intact, but she is very forgetful with short-term memory and sometimes also seems to remember things that other people don't remember. She has not had any visual, auditory, tactile, olfactory hallucinations, but does note a history of this.    Geodon was recently discontinued and she was started on amlodipine and Ozempic at her PCP's office.     She had a psychiatric admission in 1996 or 1997 and was placed on antipsychotic medications, but her daughter is not sure what diagnosis she was given. Geodon was recently discontinued at her PCP's office. She does have a history of tardive dyskinesia noted in the chart.      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       PROBLEM LIST   Patient Active Problem List   Diagnosis Code     Essential hypertension I10     DM (diabetes mellitus), type 2 with peripheral vascular complications (H) E11.51     Hyperlipidemia E78.5     Depression F32.9     Compression fracture of L1 lumbar vertebra (H) S32.010A     Coronary artery disease involving native coronary artery with angina pectoris (H) I25.119     IBS (irritable bowel syndrome) K58.9         PAST MEDICAL & SURGICAL HISTORY     Past Medical History:   Patient  has a past medical history of Abdominal pain, chronic, right lower quadrant (12/22/2014), Dementia (H), Depressive disorder, Diabetes mellitus (H), Hypertension, Influenza (12/24/2014), and Syncope (3/5/2020).    Surgical History:  She  has  "no past surgical history on file.     SOCIAL HISTORY     Reviewed, and she  reports that she has never smoked. She has never used smokeless tobacco. She reports that she does not drink alcohol or use drugs.     FAMILY HISTORY     Reviewed, and family history includes Unknown/Adopted in her father and mother.     ALLERGIES     Allergies   Allergen Reactions     Dicyclomine Unknown and Dizziness     Started at [facility] 10/17; d/c'd shortly after due to SEs     Hydrochlorothiazide Unknown     Stopped taking due to abdominal pain     Isosorbide Other (See Comments)     Severe leg pain  Leg pain  Severe leg pain           REVIEW OF SYSTEMS     A 12 point review of system was performed and was negative except as outlined in the history of present illness.    Does endorse some intermittent dizziness.      HOME MEDICATIONS     Current Outpatient Rx   Medication Sig Dispense Refill     acetaminophen (TYLENOL) 325 MG tablet Take 325-650 mg by mouth every 6 hours as needed        amLODIPine (NORVASC) 10 MG tablet        aspirin 81 MG tablet Take 81 mg by mouth daily        atorvastatin (LIPITOR) 40 MG tablet Take 80 mg by mouth daily        CALCIUM PO Take 1 tablet by mouth daily        carvedilol (COREG) 25 MG tablet        CEQUA 0.09 % SOLN        GLIPIZIDE PO Dose unknown       losartan (COZAAR) 100 MG tablet Take 100 mg by mouth daily       multivitamin, therapeutic with minerals (THERA-VIT-M) TABS Take 1 tablet by mouth daily       OYSTER SHELL CALCIUM/D 500-200 MG-UNIT tablet        OZEMPIC, 0.25 OR 0.5 MG/DOSE, 2 MG/1.5ML SOPN        vitamin D3 (CHOLECALCIFEROL) 2000 units (50 mcg) tablet Take 2,000 Units by mouth daily        ziprasidone (GEODON) 40 MG capsule            PHYSICAL EXAM     Vital signs  BP (!) 86/55 (BP Location: Left arm, Patient Position: Sitting)   Pulse 78   Ht 1.499 m (4' 11\")   Wt 63.5 kg (140 lb)   BMI 28.28 kg/m      Weight:   140 lbs 0 oz    Patient is alert and oriented to person, but not " place, time. In no acute distress. Vital signs were reviewed and are documented in electronic medical record. Neck was supple, no carotid bruits, JVD, or lymphadenopathy was noted.   NEUROLOGY EXAM:    Patient s speech was normal with no aphasia or dysarthria.     Short-term memory impaired with inability to remember 3 words on mini-cog exam. Unable to draw clock. Named foods she ate for breakfast, but daughter notes these were incorrect.     Cranial nerves II -XII were intact other than difficulty understanding testing for CN XI    Patient had normal mass, tone and motor strength was 5/5 in all extremities without pronator drift.      Sensation was intact to light touch intact.    Reflexes were 1+ symmetrical with downgoing toes.     Noted to be rubbing at thumbs bilaterally using index finger     Difficulty following commands for FNF testing.     Gait testing was abnormal with very slow gait. Initially stood up and was unable to walk forward.      DIAGNOSTIC STUDIES     PERTINENT RADIOLOGY  Following imaging studies were reviewed:     ECHO 3/5/21   Interpretation Summary  Global and regional left ventricular function is hyperkinetic with an EF of  65-70%.  Global right ventricular function is normal.  No significant valvular abnormalities were noted.     PERTINENT LABS  Following labs were reviewed:  No visits with results within 3 Month(s) from this visit.      Labs 3/12/21  TSH: 1.10  Hematocrit 41.5  Hemoglobin 13.2  MCV 89.8  RDW 13.2  WBC 8.51  Platelet 247  Vitamin D25 hydroxy 37  Vitamin B12 739  Hemoglobin A1C 10.2 (H)  Creatinine 0.67  Total protein 7.8  Albumin 4.0  Bili total 0.5   Alk phos 87  AST 20  ALT 13  Bili direct <0.2  Sodium 138  Potassium 4.2  Chloride 100  CO2 28  BUN 16  Calcium 9.7  AG 10  Glucose 285 (H)   Cholesterol 182  Triglyceride 215 (H)  HDL 41 (H)  (H)     Latest known visit with results is:   Admission on 03/05/2020, Discharged on 03/06/2020   Component Date Value      Color Urine 03/05/2020 Yellow      Appearance Urine 03/05/2020 Clear      Glucose Urine 03/05/2020 300*     Bilirubin Urine 03/05/2020 Negative      Ketones Urine 03/05/2020 Negative      Specific Gravity Urine 03/05/2020 1.009      Blood Urine 03/05/2020 Negative      pH Urine 03/05/2020 6.5      Protein Albumin Urine 03/05/2020 Negative      Urobilinogen mg/dL 03/05/2020 Normal      Nitrite Urine 03/05/2020 Negative      Leukocyte Esterase Urine 03/05/2020 Negative      Source 03/05/2020 Midstream Urine      WBC Urine 03/05/2020 3      RBC Urine 03/05/2020 1      Bacteria Urine 03/05/2020 Few*     Squamous Epithelial /HPF* 03/05/2020 1      Mucous Urine 03/05/2020 Present*     Hyaline Casts 03/05/2020 3*     Interpretation ECG 03/05/2020 Click View Image link to view waveform and result      WBC 03/05/2020 13.6*     RBC Count 03/05/2020 4.72      Hemoglobin 03/05/2020 13.8      Hematocrit 03/05/2020 40.7      MCV 03/05/2020 86      MCH 03/05/2020 29.2      MCHC 03/05/2020 33.9      RDW 03/05/2020 12.9      Platelet Count 03/05/2020 277      Diff Method 03/05/2020 Automated Method      % Neutrophils 03/05/2020 75.4      % Lymphocytes 03/05/2020 19.9      % Monocytes 03/05/2020 2.9      % Eosinophils 03/05/2020 1.3      % Basophils 03/05/2020 0.2      % Immature Granulocytes 03/05/2020 0.3      Nucleated RBCs 03/05/2020 0      Absolute Neutrophil 03/05/2020 10.2*     Absolute Lymphocytes 03/05/2020 2.7      Absolute Monocytes 03/05/2020 0.4      Absolute Eosinophils 03/05/2020 0.2      Absolute Basophils 03/05/2020 0.0      Abs Immature Granulocytes 03/05/2020 0.0      Absolute Nucleated RBC 03/05/2020 0.0      Sodium 03/05/2020 137      Potassium 03/05/2020 5.6*     Chloride 03/05/2020 105      Carbon Dioxide 03/05/2020 23      Anion Gap 03/05/2020 9      Glucose 03/05/2020 258*     Urea Nitrogen 03/05/2020 20      Creatinine 03/05/2020 0.63      GFR Estimate 03/05/2020 85      GFR Estimate If Black 03/05/2020 >90       Calcium 03/05/2020 9.7      Bilirubin Total 03/05/2020 0.5      Albumin 03/05/2020 3.5      Protein Total 03/05/2020 8.1      Alkaline Phosphatase 03/05/2020 73      ALT 03/05/2020 16      AST 03/05/2020 23      Troponin I ES 03/05/2020 <0.015      Capillary Blood Collecti* 03/05/2020 Capillary collection performed      WBC 03/06/2020 9.8      RBC Count 03/06/2020 4.13      Hemoglobin 03/06/2020 11.9      Hematocrit 03/06/2020 37.0      MCV 03/06/2020 90      MCH 03/06/2020 28.8      MCHC 03/06/2020 32.2      RDW 03/06/2020 13.1      Platelet Count 03/06/2020 274      Sodium 03/06/2020 140      Potassium 03/06/2020 3.4      Chloride 03/06/2020 110*     Carbon Dioxide 03/06/2020 24      Anion Gap 03/06/2020 6      Glucose 03/06/2020 188*     Urea Nitrogen 03/06/2020 18      Creatinine 03/06/2020 0.67      GFR Estimate 03/06/2020 83      GFR Estimate If Black 03/06/2020 >90      Calcium 03/06/2020 8.6      Troponin I ES 03/06/2020 <0.015         Total time spent for face to face visit, reviewing labs/imaging studies, counseling and coordination of care was: 1 Hour spent on the date of the encounter doing chart review, review of outside records, review of test results, interpretation of tests, patient visit, documentation and discussion with family       This note was dictated using voice recognition software.  Any grammatical or context distortions are unintentional and inherent to the software.

## 2021-04-08 NOTE — PROGRESS NOTES
NEUROLOGY CONSULTATION NOTE       [facility] NEUROLOGY [place]  [address] [place] [address]  Tel: [phone]  Fax: [phone]  [email or web address]     Ariana Barnes,  1940, MRN [id number]  PCP: [facility], [phone]  Date: 2021     ASSESSMENT & PLAN     Diagnosis code  1. Neurocognitive disorder     ***    Thank you again for this referral, please feel free to contact me if you have any questions.    Charly Taylor MD  [facility]  ([facility], [facility]     REASON FOR CONSULTATION No chief complaint on file.        HISTORY OF PRESENT ILLNESS     We have been requested by  *** to evaluate Ariana Barnes who is a 81 year old  female for ***       PROBLEM LIST   Patient Active Problem List   Diagnosis Code     Essential hypertension I10     DM (diabetes mellitus), type 2 with peripheral vascular complications (H) E11.51     Hyperlipidemia E78.5     Depression F32.9     Compression fracture of L1 lumbar vertebra (H) S32.010A     Coronary artery disease involving native coronary artery with angina pectoris (H) I25.119     IBS (irritable bowel syndrome) K58.9         PAST MEDICAL & SURGICAL HISTORY     Past Medical History:   Patient  has a past medical history of Abdominal pain, chronic, right lower quadrant (2014), Dementia (H), Depressive disorder, Diabetes mellitus (H), Hypertension, Influenza (2014), and Syncope (3/5/2020).    Surgical History:  She  has no past surgical history on file.     SOCIAL HISTORY     Reviewed, and she  reports that she has never smoked. She has never used smokeless tobacco. She reports that she does not drink alcohol or use drugs.     FAMILY HISTORY     Reviewed, and family history is not on file.     ALLERGIES     Allergies   Allergen Reactions     Dicyclomine Unknown and Dizziness     Started at [facility] 10/17; d/c'd shortly  after due to SEs     Hydrochlorothiazide Unknown     Stopped taking due to abdominal pain     Isosorbide Other (See Comments)     Severe leg pain  Leg pain  Severe leg pain           REVIEW OF SYSTEMS     A 12 point review of system was performed and was negative except as outlined in the history of present illness.     HOME MEDICATIONS     Current Outpatient Rx   Medication Sig Dispense Refill     acetaminophen (TYLENOL) 325 MG tablet Take 325-650 mg by mouth every 6 hours as needed        aspirin 81 MG tablet Take 81 mg by mouth daily        atorvastatin (LIPITOR) 40 MG tablet Take 80 mg by mouth daily        CALCIUM PO Take 1 tablet by mouth daily        GLIPIZIDE PO Dose unknown       losartan (COZAAR) 100 MG tablet Take 100 mg by mouth daily       multivitamin, therapeutic with minerals (THERA-VIT-M) TABS Take 1 tablet by mouth daily       vitamin D3 (CHOLECALCIFEROL) 2000 units (50 mcg) tablet Take 2,000 Units by mouth daily            PHYSICAL EXAM     Vital signs  There were no vitals taken for this visit.    Weight:   0 lbs 0 oz    ***     DIAGNOSTIC STUDIES     PERTINENT RADIOLOGY  Following imaging studies were reviewed:          PERTINENT LABS  Following labs were reviewed:  No visits with results within 3 Month(s) from this visit.   Latest known visit with results is:   Admission on 03/05/2020, Discharged on 03/06/2020   Component Date Value     Color Urine 03/05/2020 Yellow      Appearance Urine 03/05/2020 Clear      Glucose Urine 03/05/2020 300*     Bilirubin Urine 03/05/2020 Negative      Ketones Urine 03/05/2020 Negative      Specific Gravity Urine 03/05/2020 1.009      Blood Urine 03/05/2020 Negative      pH Urine 03/05/2020 6.5      Protein Albumin Urine 03/05/2020 Negative      Urobilinogen mg/dL 03/05/2020 Normal      Nitrite Urine 03/05/2020 Negative      Leukocyte Esterase Urine 03/05/2020 Negative      Source 03/05/2020 Midstream Urine      WBC Urine 03/05/2020 3      RBC Urine 03/05/2020 1       Bacteria Urine 03/05/2020 Few*     Squamous Epithelial /HPF* 03/05/2020 1      Mucous Urine 03/05/2020 Present*     Hyaline Casts 03/05/2020 3*     Interpretation ECG 03/05/2020 Click View Image link to view waveform and result      WBC 03/05/2020 13.6*     RBC Count 03/05/2020 4.72      Hemoglobin 03/05/2020 13.8      Hematocrit 03/05/2020 40.7      MCV 03/05/2020 86      MCH 03/05/2020 29.2      MCHC 03/05/2020 33.9      RDW 03/05/2020 12.9      Platelet Count 03/05/2020 277      Diff Method 03/05/2020 Automated Method      % Neutrophils 03/05/2020 75.4      % Lymphocytes 03/05/2020 19.9      % Monocytes 03/05/2020 2.9      % Eosinophils 03/05/2020 1.3      % Basophils 03/05/2020 0.2      % Immature Granulocytes 03/05/2020 0.3      Nucleated RBCs 03/05/2020 0      Absolute Neutrophil 03/05/2020 10.2*     Absolute Lymphocytes 03/05/2020 2.7      Absolute Monocytes 03/05/2020 0.4      Absolute Eosinophils 03/05/2020 0.2      Absolute Basophils 03/05/2020 0.0      Abs Immature Granulocytes 03/05/2020 0.0      Absolute Nucleated RBC 03/05/2020 0.0      Sodium 03/05/2020 137      Potassium 03/05/2020 5.6*     Chloride 03/05/2020 105      Carbon Dioxide 03/05/2020 23      Anion Gap 03/05/2020 9      Glucose 03/05/2020 258*     Urea Nitrogen 03/05/2020 20      Creatinine 03/05/2020 0.63      GFR Estimate 03/05/2020 85      GFR Estimate If Black 03/05/2020 >90      Calcium 03/05/2020 9.7      Bilirubin Total 03/05/2020 0.5      Albumin 03/05/2020 3.5      Protein Total 03/05/2020 8.1      Alkaline Phosphatase 03/05/2020 73      ALT 03/05/2020 16      AST 03/05/2020 23      Troponin I ES 03/05/2020 <0.015      Capillary Blood Collecti* 03/05/2020 Capillary collection performed      WBC 03/06/2020 9.8      RBC Count 03/06/2020 4.13      Hemoglobin 03/06/2020 11.9      Hematocrit 03/06/2020 37.0      MCV 03/06/2020 90      MCH 03/06/2020 28.8      MCHC 03/06/2020 32.2      RDW 03/06/2020 13.1      Platelet Count  03/06/2020 274      Sodium 03/06/2020 140      Potassium 03/06/2020 3.4      Chloride 03/06/2020 110*     Carbon Dioxide 03/06/2020 24      Anion Gap 03/06/2020 6      Glucose 03/06/2020 188*     Urea Nitrogen 03/06/2020 18      Creatinine 03/06/2020 0.67      GFR Estimate 03/06/2020 83      GFR Estimate If Black 03/06/2020 >90      Calcium 03/06/2020 8.6      Troponin I ES 03/06/2020 <0.015         Total time spent for face to face visit, reviewing labs/imaging studies, counseling and coordination of care was: {time:70830} spent on the date of the encounter doing {2021 E&M time in:071470}       This note was dictated using voice recognition software.  Any grammatical or context distortions are unintentional and inherent to the software.

## 2021-04-08 NOTE — LETTER
2021         RE: Ariana Barnes  [address]  [facility] [address]        Dear Colleague,    Thank you for referring your patient, Ariana Barnes, to the [facility] [place]. Please see a copy of my visit note below.    NEUROLOGY CONSULTATION NOTE       [facility] [place]  [address] [place] [address]  Tel: [phone]  Fax: [phone]  [email or web address]     Ariana Barnes,  1940, MRN [id number]  PCP: [facility], [phone]  Date: 2021     ASSESSMENT & PLAN     Diagnosis code  1. Neurocognitive disorder   2. Hypotension     Neurocognitive disorder  Patient with likely dementia that is quite progressed with significant impairment in ADL's. Outside labs completed thus far have not revealed obvious etiology. This likely represents Alzheimer dementia, but patient also has risk factors with hypertension and type 2 diabetes for vascular dementia or mixed picture. History of psychiatric diagnosis on antipsychotics for many years with history of tardive dyskinesia does complicate picture somewhat.  - MRI  - Lyme panel   - Methylmalonic acid level   - Homocystine  - Folate  - Vitamin B1  - Vitamin E  - Anticipate starting donepezil pending results     Hypotension  Patient noted to be hypotensive on exam today, but appears clinically stable. Recent adjustment in antihypertensive medications likely contributing. Recommended close follow up with PCP regarding medications.     Thank you again for this referral, please feel free to contact me if you have any questions.      Patient discussed with attending physician, Dr. Charly Taylor, who agrees with above assessment and plan.       Johnna Roberts MD, PGY3          Charly Taylor MD  [facility]  (Former[facility], [facility]     REASON FOR CONSULTATION Memory Loss and Gait Problem         HISTORY OF PRESENT ILLNESS     We have been requested by Dr. Winnie Vilchis to evaluate Ariana Barnes who is a 81 year old  female for memory loss and gait problem.     Patient has a long-standing issue of memory loss over many years, bu this seems to have been worsening over the past 6 months and then more significant decline in January that seemed to start after COVID-19 vaccine. She is unable to do many activities of daily living and requires full assistance with dressing, grooming, cooking, finances. She is incontinent of urine at times, but can sometimes toilet with assistance. Family notes that there are times where she even seems to forget how to walk. Her long-term memory is more intact, but she is very forgetful with short-term memory and sometimes also seems to remember things that other people don't remember. She has not had any visual, auditory, tactile, olfactory hallucinations, but does note a history of this.    Geodon was recently discontinued and she was started on amlodipine and Ozempic at her PCP's office.     She had a psychiatric admission in 1996 or 1997 and was placed on antipsychotic medications, but her daughter is not sure what diagnosis she was given. Geodon was recently discontinued at her PCP's office. She does have a history of tardive dyskinesia noted in the chart.      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       PROBLEM LIST   Patient Active Problem List   Diagnosis Code     Essential hypertension I10     DM (diabetes mellitus), type 2 with peripheral vascular complications (H) E11.51     Hyperlipidemia E78.5     Depression F32.9     Compression fracture of L1 lumbar vertebra (H) S32.010A     Coronary artery disease involving native coronary artery with angina pectoris (H) I25.119     IBS (irritable bowel syndrome) K58.9         PAST MEDICAL & SURGICAL HISTORY     Past  Medical History:   Patient  has a past medical history of Abdominal pain, chronic, right lower quadrant (12/22/2014), Dementia (H), Depressive disorder, Diabetes mellitus (H), Hypertension, Influenza (12/24/2014), and Syncope (3/5/2020).    Surgical History:  She  has no past surgical history on file.     SOCIAL HISTORY     Reviewed, and she  reports that she has never smoked. She has never used smokeless tobacco. She reports that she does not drink alcohol or use drugs.     FAMILY HISTORY     Reviewed, and family history includes Unknown/Adopted in her father and mother.     ALLERGIES     Allergies   Allergen Reactions     Dicyclomine Unknown and Dizziness     Started at [facility] 10/17; d/c'd shortly after due to SEs     Hydrochlorothiazide Unknown     Stopped taking due to abdominal pain     Isosorbide Other (See Comments)     Severe leg pain  Leg pain  Severe leg pain           REVIEW OF SYSTEMS     A 12 point review of system was performed and was negative except as outlined in the history of present illness.    Does endorse some intermittent dizziness.      HOME MEDICATIONS     Current Outpatient Rx   Medication Sig Dispense Refill     acetaminophen (TYLENOL) 325 MG tablet Take 325-650 mg by mouth every 6 hours as needed        amLODIPine (NORVASC) 10 MG tablet        aspirin 81 MG tablet Take 81 mg by mouth daily        atorvastatin (LIPITOR) 40 MG tablet Take 80 mg by mouth daily        CALCIUM PO Take 1 tablet by mouth daily        carvedilol (COREG) 25 MG tablet        CEQUA 0.09 % SOLN        GLIPIZIDE PO Dose unknown       losartan (COZAAR) 100 MG tablet Take 100 mg by mouth daily       multivitamin, therapeutic with minerals (THERA-VIT-M) TABS Take 1 tablet by mouth daily       OYSTER SHELL CALCIUM/D 500-200 MG-UNIT tablet        OZEMPIC, 0.25 OR 0.5 MG/DOSE, 2 MG/1.5ML SOPN        vitamin D3 (CHOLECALCIFEROL) 2000 units (50 mcg) tablet Take 2,000 Units by mouth daily        ziprasidone (GEODON) 40 MG  "capsule            PHYSICAL EXAM     Vital signs  BP (!) 86/55 (BP Location: Left arm, Patient Position: Sitting)   Pulse 78   Ht 1.499 m (4' 11\")   Wt 63.5 kg (140 lb)   BMI 28.28 kg/m      Weight:   140 lbs 0 oz    Patient is alert and oriented to person, but not place, time. In no acute distress. Vital signs were reviewed and are documented in electronic medical record. Neck was supple, no carotid bruits, JVD, or lymphadenopathy was noted.   NEUROLOGY EXAM:    Patient s speech was normal with no aphasia or dysarthria.     Short-term memory impaired with inability to remember 3 words on mini-cog exam. Unable to draw clock. Named foods she ate for breakfast, but daughter notes these were incorrect.     Cranial nerves II -XII were intact other than difficulty understanding testing for CN XI    Patient had normal mass, tone and motor strength was 5/5 in all extremities without pronator drift.      Sensation was intact to light touch intact.    Reflexes were 1+ symmetrical with downgoing toes.     Noted to be rubbing at thumbs bilaterally using index finger     Difficulty following commands for FNF testing.     Gait testing was abnormal with very slow gait. Initially stood up and was unable to walk forward.      DIAGNOSTIC STUDIES     PERTINENT RADIOLOGY  Following imaging studies were reviewed:     ECHO 3/5/21   Interpretation Summary  Global and regional left ventricular function is hyperkinetic with an EF of  65-70%.  Global right ventricular function is normal.  No significant valvular abnormalities were noted.     PERTINENT LABS  Following labs were reviewed:  No visits with results within 3 Month(s) from this visit.      Labs 3/12/21  TSH: 1.10  Hematocrit 41.5  Hemoglobin 13.2  MCV 89.8  RDW 13.2  WBC 8.51  Platelet 247  Vitamin D25 hydroxy 37  Vitamin B12 739  Hemoglobin A1C 10.2 (H)  Creatinine 0.67  Total protein 7.8  Albumin 4.0  Bili total 0.5   Alk phos 87  AST 20  ALT 13  Bili direct <0.2  Sodium " 138  Potassium 4.2  Chloride 100  CO2 28  BUN 16  Calcium 9.7  AG 10  Glucose 285 (H)   Cholesterol 182  Triglyceride 215 (H)  HDL 41 (H)  (H)     Latest known visit with results is:   Admission on 03/05/2020, Discharged on 03/06/2020   Component Date Value     Color Urine 03/05/2020 Yellow      Appearance Urine 03/05/2020 Clear      Glucose Urine 03/05/2020 300*     Bilirubin Urine 03/05/2020 Negative      Ketones Urine 03/05/2020 Negative      Specific Gravity Urine 03/05/2020 1.009      Blood Urine 03/05/2020 Negative      pH Urine 03/05/2020 6.5      Protein Albumin Urine 03/05/2020 Negative      Urobilinogen mg/dL 03/05/2020 Normal      Nitrite Urine 03/05/2020 Negative      Leukocyte Esterase Urine 03/05/2020 Negative      Source 03/05/2020 Midstream Urine      WBC Urine 03/05/2020 3      RBC Urine 03/05/2020 1      Bacteria Urine 03/05/2020 Few*     Squamous Epithelial /HPF* 03/05/2020 1      Mucous Urine 03/05/2020 Present*     Hyaline Casts 03/05/2020 3*     Interpretation ECG 03/05/2020 Click View Image link to view waveform and result      WBC 03/05/2020 13.6*     RBC Count 03/05/2020 4.72      Hemoglobin 03/05/2020 13.8      Hematocrit 03/05/2020 40.7      MCV 03/05/2020 86      MCH 03/05/2020 29.2      MCHC 03/05/2020 33.9      RDW 03/05/2020 12.9      Platelet Count 03/05/2020 277      Diff Method 03/05/2020 Automated Method      % Neutrophils 03/05/2020 75.4      % Lymphocytes 03/05/2020 19.9      % Monocytes 03/05/2020 2.9      % Eosinophils 03/05/2020 1.3      % Basophils 03/05/2020 0.2      % Immature Granulocytes 03/05/2020 0.3      Nucleated RBCs 03/05/2020 0      Absolute Neutrophil 03/05/2020 10.2*     Absolute Lymphocytes 03/05/2020 2.7      Absolute Monocytes 03/05/2020 0.4      Absolute Eosinophils 03/05/2020 0.2      Absolute Basophils 03/05/2020 0.0      Abs Immature Granulocytes 03/05/2020 0.0      Absolute Nucleated RBC 03/05/2020 0.0      Sodium 03/05/2020 137      Potassium  03/05/2020 5.6*     Chloride 03/05/2020 105      Carbon Dioxide 03/05/2020 23      Anion Gap 03/05/2020 9      Glucose 03/05/2020 258*     Urea Nitrogen 03/05/2020 20      Creatinine 03/05/2020 0.63      GFR Estimate 03/05/2020 85      GFR Estimate If Black 03/05/2020 >90      Calcium 03/05/2020 9.7      Bilirubin Total 03/05/2020 0.5      Albumin 03/05/2020 3.5      Protein Total 03/05/2020 8.1      Alkaline Phosphatase 03/05/2020 73      ALT 03/05/2020 16      AST 03/05/2020 23      Troponin I ES 03/05/2020 <0.015      Capillary Blood Collecti* 03/05/2020 Capillary collection performed      WBC 03/06/2020 9.8      RBC Count 03/06/2020 4.13      Hemoglobin 03/06/2020 11.9      Hematocrit 03/06/2020 37.0      MCV 03/06/2020 90      MCH 03/06/2020 28.8      MCHC 03/06/2020 32.2      RDW 03/06/2020 13.1      Platelet Count 03/06/2020 274      Sodium 03/06/2020 140      Potassium 03/06/2020 3.4      Chloride 03/06/2020 110*     Carbon Dioxide 03/06/2020 24      Anion Gap 03/06/2020 6      Glucose 03/06/2020 188*     Urea Nitrogen 03/06/2020 18      Creatinine 03/06/2020 0.67      GFR Estimate 03/06/2020 83      GFR Estimate If Black 03/06/2020 >90      Calcium 03/06/2020 8.6      Troponin I ES 03/06/2020 <0.015         Total time spent for face to face visit, reviewing labs/imaging studies, counseling and coordination of care was: 1 Hour spent on the date of the encounter doing chart review, review of outside records, review of test results, interpretation of tests, patient visit, documentation and discussion with family       This note was dictated using voice recognition software.  Any grammatical or context distortions are unintentional and inherent to the software.       Attestation signed by Charly Taylor MD at 4/8/2021  1:29 PM:  NEUROLOGY  NOTE       [facility] NEUROLOGY [place] [address] [place] [address]  Tel: [phone]  Fax: [phone]  [email or web address]     I performed a  history and physical examination of the patient and discussed management with the resident. I reviewed the resident s note and agree with the documented findings and plan of care.     Total time spent examining patient, counseling, reviewing labs/imaging studies and coordination of care was: 1 Hour      Charly Taylor MD  [facility]  (Formerly, [facility]    This note was dictated using voice recognition software.  Any grammatical or context distortions are unintentional and inherent to the software.             Again, thank you for allowing me to participate in the care of your patient.        Sincerely,        Charly Taylor MD

## 2021-04-30 DIAGNOSIS — R41.9 NEUROCOGNITIVE DISORDER: ICD-10-CM

## 2021-04-30 LAB — FOLATE SERPL-MCNC: 39.7 NG/ML

## 2021-04-30 PROCEDURE — 82746 ASSAY OF FOLIC ACID SERUM: CPT | Performed by: PSYCHIATRY & NEUROLOGY

## 2021-04-30 PROCEDURE — 84446 ASSAY OF VITAMIN E: CPT | Performed by: PSYCHIATRY & NEUROLOGY

## 2021-04-30 PROCEDURE — 36415 COLL VENOUS BLD VENIPUNCTURE: CPT | Performed by: PSYCHIATRY & NEUROLOGY

## 2021-04-30 PROCEDURE — 83090 ASSAY OF HOMOCYSTEINE: CPT | Performed by: PSYCHIATRY & NEUROLOGY

## 2021-04-30 PROCEDURE — 83921 ORGANIC ACID SINGLE QUANT: CPT | Performed by: PSYCHIATRY & NEUROLOGY

## 2021-04-30 PROCEDURE — 84425 ASSAY OF VITAMIN B-1: CPT | Performed by: PSYCHIATRY & NEUROLOGY

## 2021-05-04 LAB
A-TOCOPHEROL VIT E SERPL-MCNC: 16.8 MG/L (ref 5.5–18)
BETA+GAMMA TOCOPHEROL SERPL-MCNC: 1.6 MG/L (ref 0–6)
VIT B1 BLD-MCNC: 151 NMOL/L (ref 70–180)

## 2021-05-05 LAB — HCYS SERPL-SCNC: 13.4 UMOL/L (ref 4–12)

## 2021-05-06 LAB — METHYLMALONATE SERPL-SCNC: 0.19 UMOL/L (ref 0–0.4)

## 2021-05-08 ENCOUNTER — APPOINTMENT (OUTPATIENT)
Dept: CT IMAGING | Facility: CLINIC | Age: 81
End: 2021-05-08
Attending: EMERGENCY MEDICINE
Payer: COMMERCIAL

## 2021-05-08 ENCOUNTER — APPOINTMENT (OUTPATIENT)
Dept: ULTRASOUND IMAGING | Facility: CLINIC | Age: 81
End: 2021-05-08
Attending: EMERGENCY MEDICINE
Payer: COMMERCIAL

## 2021-05-08 ENCOUNTER — PATIENT OUTREACH (OUTPATIENT)
Dept: CARE COORDINATION | Facility: CLINIC | Age: 81
End: 2021-05-08

## 2021-05-08 ENCOUNTER — ANCILLARY PROCEDURE (OUTPATIENT)
Dept: MRI IMAGING | Facility: CLINIC | Age: 81
End: 2021-05-08
Attending: PSYCHIATRY & NEUROLOGY
Payer: COMMERCIAL

## 2021-05-08 ENCOUNTER — HOSPITAL ENCOUNTER (EMERGENCY)
Facility: CLINIC | Age: 81
Discharge: HOME | End: 2021-05-08
Attending: EMERGENCY MEDICINE | Admitting: EMERGENCY MEDICINE
Payer: COMMERCIAL

## 2021-05-08 VITALS
WEIGHT: 140 LBS | TEMPERATURE: 98 F | SYSTOLIC BLOOD PRESSURE: 115 MMHG | HEIGHT: 59 IN | BODY MASS INDEX: 28.22 KG/M2 | RESPIRATION RATE: 20 BRPM | HEART RATE: 92 BPM | OXYGEN SATURATION: 98 % | DIASTOLIC BLOOD PRESSURE: 81 MMHG

## 2021-05-08 DIAGNOSIS — I63.9 CEREBROVASCULAR ACCIDENT (CVA), UNSPECIFIED MECHANISM (H): ICD-10-CM

## 2021-05-08 DIAGNOSIS — R41.9 NEUROCOGNITIVE DISORDER: ICD-10-CM

## 2021-05-08 DIAGNOSIS — I10 ESSENTIAL HYPERTENSION, MALIGNANT: ICD-10-CM

## 2021-05-08 DIAGNOSIS — Z11.52 ENCOUNTER FOR SCREENING LABORATORY TESTING FOR SEVERE ACUTE RESPIRATORY SYNDROME CORONAVIRUS 2 (SARS-COV-2): ICD-10-CM

## 2021-05-08 LAB
ALBUMIN UR-MCNC: NEGATIVE MG/DL
ANION GAP SERPL CALCULATED.3IONS-SCNC: 5 MMOL/L (ref 3–14)
APPEARANCE UR: CLEAR
APTT PPP: 27 SEC (ref 22–37)
BASOPHILS # BLD AUTO: 0.1 10E9/L (ref 0–0.2)
BASOPHILS NFR BLD AUTO: 0.7 %
BILIRUB UR QL STRIP: NEGATIVE
BUN SERPL-MCNC: 19 MG/DL (ref 7–30)
CALCIUM SERPL-MCNC: 9.4 MG/DL (ref 8.5–10.1)
CHLORIDE SERPL-SCNC: 104 MMOL/L (ref 94–109)
CO2 SERPL-SCNC: 30 MMOL/L (ref 20–32)
COLOR UR AUTO: YELLOW
CREAT SERPL-MCNC: 0.76 MG/DL (ref 0.52–1.04)
DIFFERENTIAL METHOD BLD: NORMAL
EOSINOPHIL # BLD AUTO: 0.3 10E9/L (ref 0–0.7)
EOSINOPHIL NFR BLD AUTO: 3.5 %
ERYTHROCYTE [DISTWIDTH] IN BLOOD BY AUTOMATED COUNT: 13.2 % (ref 10–15)
GFR SERPL CREATININE-BSD FRML MDRD: 73 ML/MIN/{1.73_M2}
GLUCOSE SERPL-MCNC: 136 MG/DL (ref 70–99)
GLUCOSE UR STRIP-MCNC: NEGATIVE MG/DL
HCT VFR BLD AUTO: 38.3 % (ref 35–47)
HGB BLD-MCNC: 12.5 G/DL (ref 11.7–15.7)
HGB UR QL STRIP: NEGATIVE
IMM GRANULOCYTES # BLD: 0 10E9/L (ref 0–0.4)
IMM GRANULOCYTES NFR BLD: 0.2 %
INR PPP: 0.96 (ref 0.86–1.14)
INTERPRETATION ECG - MUSE: NORMAL
KETONES UR STRIP-MCNC: NEGATIVE MG/DL
LABORATORY COMMENT REPORT: NORMAL
LEUKOCYTE ESTERASE UR QL STRIP: NEGATIVE
LYMPHOCYTES # BLD AUTO: 2.9 10E9/L (ref 0.8–5.3)
LYMPHOCYTES NFR BLD AUTO: 31 %
MCH RBC QN AUTO: 29.6 PG (ref 26.5–33)
MCHC RBC AUTO-ENTMCNC: 32.6 G/DL (ref 31.5–36.5)
MCV RBC AUTO: 91 FL (ref 78–100)
MONOCYTES # BLD AUTO: 0.6 10E9/L (ref 0–1.3)
MONOCYTES NFR BLD AUTO: 5.9 %
MUCOUS THREADS #/AREA URNS LPF: PRESENT /LPF
NEUTROPHILS # BLD AUTO: 5.5 10E9/L (ref 1.6–8.3)
NEUTROPHILS NFR BLD AUTO: 58.7 %
NITRATE UR QL: NEGATIVE
NRBC # BLD AUTO: 0 10*3/UL
NRBC BLD AUTO-RTO: 0 /100
PH UR STRIP: 6 PH (ref 5–7)
PLATELET # BLD AUTO: 322 10E9/L (ref 150–450)
POTASSIUM SERPL-SCNC: 3.5 MMOL/L (ref 3.4–5.3)
RADIOLOGIST FLAGS: ABNORMAL
RBC # BLD AUTO: 4.22 10E12/L (ref 3.8–5.2)
RBC #/AREA URNS AUTO: 1 /HPF (ref 0–2)
SARS-COV-2 RNA RESP QL NAA+PROBE: NEGATIVE
SODIUM SERPL-SCNC: 139 MMOL/L (ref 133–144)
SOURCE: ABNORMAL
SP GR UR STRIP: 1.01 (ref 1–1.03)
SPECIMEN SOURCE: NORMAL
SQUAMOUS #/AREA URNS AUTO: 2 /HPF (ref 0–1)
TROPONIN I SERPL-MCNC: <0.015 UG/L (ref 0–0.04)
UROBILINOGEN UR STRIP-MCNC: NORMAL MG/DL (ref 0–2)
WBC # BLD AUTO: 9.4 10E9/L (ref 4–11)
WBC #/AREA URNS AUTO: 1 /HPF (ref 0–5)

## 2021-05-08 PROCEDURE — 250N000011 HC RX IP 250 OP 636: Performed by: RADIOLOGY

## 2021-05-08 PROCEDURE — U0005 INFEC AGEN DETEC AMPLI PROBE: HCPCS | Performed by: EMERGENCY MEDICINE

## 2021-05-08 PROCEDURE — 120N000001 HC R&B MED SURG/OB

## 2021-05-08 PROCEDURE — 93971 EXTREMITY STUDY: CPT | Mod: 26 | Performed by: RADIOLOGY

## 2021-05-08 PROCEDURE — 99207 CT HEAD W/O CONTRAST: CPT | Mod: 26 | Performed by: RADIOLOGY

## 2021-05-08 PROCEDURE — 85610 PROTHROMBIN TIME: CPT | Performed by: EMERGENCY MEDICINE

## 2021-05-08 PROCEDURE — 81001 URINALYSIS AUTO W/SCOPE: CPT | Performed by: EMERGENCY MEDICINE

## 2021-05-08 PROCEDURE — 70496 CT ANGIOGRAPHY HEAD: CPT

## 2021-05-08 PROCEDURE — 99285 EMERGENCY DEPT VISIT HI MDM: CPT | Mod: 25 | Performed by: EMERGENCY MEDICINE

## 2021-05-08 PROCEDURE — 70498 CT ANGIOGRAPHY NECK: CPT

## 2021-05-08 PROCEDURE — 70496 CT ANGIOGRAPHY HEAD: CPT | Mod: 26 | Performed by: RADIOLOGY

## 2021-05-08 PROCEDURE — 70551 MRI BRAIN STEM W/O DYE: CPT | Performed by: RADIOLOGY

## 2021-05-08 PROCEDURE — 85025 COMPLETE CBC W/AUTO DIFF WBC: CPT | Performed by: EMERGENCY MEDICINE

## 2021-05-08 PROCEDURE — 258N000003 HC RX IP 258 OP 636: Performed by: EMERGENCY MEDICINE

## 2021-05-08 PROCEDURE — 84484 ASSAY OF TROPONIN QUANT: CPT | Performed by: EMERGENCY MEDICINE

## 2021-05-08 PROCEDURE — 93971 EXTREMITY STUDY: CPT | Mod: LT

## 2021-05-08 PROCEDURE — 93005 ELECTROCARDIOGRAM TRACING: CPT | Performed by: EMERGENCY MEDICINE

## 2021-05-08 PROCEDURE — U0003 INFECTIOUS AGENT DETECTION BY NUCLEIC ACID (DNA OR RNA); SEVERE ACUTE RESPIRATORY SYNDROME CORONAVIRUS 2 (SARS-COV-2) (CORONAVIRUS DISEASE [COVID-19]), AMPLIFIED PROBE TECHNIQUE, MAKING USE OF HIGH THROUGHPUT TECHNOLOGIES AS DESCRIBED BY CMS-2020-01-R: HCPCS | Performed by: EMERGENCY MEDICINE

## 2021-05-08 PROCEDURE — 93010 ELECTROCARDIOGRAM REPORT: CPT | Performed by: EMERGENCY MEDICINE

## 2021-05-08 PROCEDURE — 80048 BASIC METABOLIC PNL TOTAL CA: CPT | Performed by: EMERGENCY MEDICINE

## 2021-05-08 PROCEDURE — 70498 CT ANGIOGRAPHY NECK: CPT | Mod: 26 | Performed by: RADIOLOGY

## 2021-05-08 PROCEDURE — 96360 HYDRATION IV INFUSION INIT: CPT | Mod: 59 | Performed by: EMERGENCY MEDICINE

## 2021-05-08 PROCEDURE — 70450 CT HEAD/BRAIN W/O DYE: CPT | Mod: XS

## 2021-05-08 PROCEDURE — 85730 THROMBOPLASTIN TIME PARTIAL: CPT | Performed by: EMERGENCY MEDICINE

## 2021-05-08 RX ORDER — LIDOCAINE 40 MG/G
CREAM TOPICAL
Status: CANCELLED | OUTPATIENT
Start: 2021-05-08

## 2021-05-08 RX ORDER — ASPIRIN 300 MG/1
300 SUPPOSITORY RECTAL ONCE
Status: DISCONTINUED | OUTPATIENT
Start: 2021-05-08 | End: 2021-05-08 | Stop reason: HOSPADM

## 2021-05-08 RX ORDER — IOPAMIDOL 755 MG/ML
75 INJECTION, SOLUTION INTRAVASCULAR ONCE
Status: COMPLETED | OUTPATIENT
Start: 2021-05-08 | End: 2021-05-08

## 2021-05-08 RX ADMIN — SODIUM CHLORIDE 500 ML: 9 INJECTION, SOLUTION INTRAVENOUS at 14:59

## 2021-05-08 RX ADMIN — IOPAMIDOL 75 ML: 755 INJECTION, SOLUTION INTRAVENOUS at 14:20

## 2021-05-08 ASSESSMENT — ENCOUNTER SYMPTOMS
ARTHRALGIAS: 0
SHORTNESS OF BREATH: 0
FEVER: 0
COLOR CHANGE: 0
HEADACHES: 0
ABDOMINAL PAIN: 0
DIFFICULTY URINATING: 0
EYE REDNESS: 0
NECK STIFFNESS: 0
CONFUSION: 0

## 2021-05-08 ASSESSMENT — MIFFLIN-ST. JEOR: SCORE: 1005.67

## 2021-05-08 NOTE — ED NOTES
[facility]   ED Nurse to Floor Handoff     Ariana Barnes is a 81 year old female who speaks [place] and lives alone,  in a home  They arrived in the ED by ambulance from clinic    ED Chief Complaint: Cerebrovascular Accident    ED Dx;   Final diagnoses:   Cerebrovascular accident (CVA), unspecified mechanism (H)         Needed?: Yes    Allergies:   Allergies   Allergen Reactions     Dicyclomine Unknown and Dizziness     Started at [facility] 10/17; d/c'd shortly after due to SEs     Hydrochlorothiazide Unknown     Stopped taking due to abdominal pain     Isosorbide Other (See Comments)     Severe leg pain  Leg pain  Severe leg pain     .  Past Medical Hx:   Past Medical History:   Diagnosis Date     Abdominal pain, chronic, right lower quadrant 12/22/2014     Dementia (H)      Depressive disorder      Diabetes mellitus (H)      Hypertension      Influenza 12/24/2014     Syncope 3/5/2020      Baseline Mental status: WDL  Current Mental Status changes: at basesline    Infection present or suspected this encounter: no  Sepsis suspected: No  Isolation type: No active isolations  Patient tested for COVID 19 prior to admission: NO, pt refused, MD aware    Activity level - Baseline/Home:  Independent  Activity Level - Current:   Independent and Stand with Assist    Bariatric equipment needed?: No    In the ED these meds were given:   Medications   0.9% sodium chloride BOLUS (500 mLs Intravenous Started 5/8/21 1459)   iopamidol (ISOVUE-370) solution 75 mL (75 mLs Intravenous Given 5/8/21 1420)   sodium chloride (PF) 0.9% PF flush 90 mL (90 mLs Intravenous Given 5/8/21 1421)       Drips running?  No    Home pump  No    Current LDAs  Peripheral IV 05/08/21 Right Lower forearm (Active)   Site Assessment WDL 05/08/21 1322   Line Status Saline locked 05/08/21 1322   Phlebitis Scale 0-->no symptoms 05/08/21 1322   Number of days: 0       Labs results:   Labs Ordered and  Resulted from Time of ED Arrival Up to the Time of Departure from the ED   BASIC METABOLIC PANEL - Abnormal; Notable for the following components:       Result Value    Glucose 136 (*)     All other components within normal limits   ROUTINE UA WITH MICROSCOPIC - Abnormal; Notable for the following components:    Squamous Epithelial /HPF Urine 2 (*)     Mucous Urine Present (*)     All other components within normal limits   CBC WITH PLATELETS DIFFERENTIAL   INR   PARTIAL THROMBOPLASTIN TIME   TROPONIN I   GLUCOSE MONITOR NURSING POCT   SARS-COV-2 (COVID-19) VIRUS RT-PCR   MEASURE WEIGHT   NEURO CHECKS   CARDIAC CONTINUOUS MONITORING   PULSE OXIMETRY NURSING   ACTIVITY   HEAD OF BED   IP DYSPHAGIA SCREEN   PERIPHERAL IV CATHETER       Imaging Studies:   Recent Results (from the past 24 hour(s))   MR Brain w/o Contrast   Result Value    Radiologist flags Small area of right parietal acute infarct . (AA)    Narrative    Brain MRI without contrast, 5/8/2021 10:49 AM    Provided History: Memory loss, neuro cognitive disorder.    Per EPIC: Patient with likely dementia that is quite progressed with  significant impairment in ADL's. Outside labs completed thus far have  not revealed obvious etiology. This likely represents Alzheimer  dementia, but patient also has risk factors with hypertension and type  2 diabetes for vascular dementia or mixed picture. History of  psychiatric diagnosis on antipsychotics for many years with history of  tardive dyskinesia does complicate picture somewhat.    Comparison:  None.     Technique: Sagittal T1-weighted and axial T2-weighted, turboFLAIR and  diffusion-weighted with ADC map images of the brain were obtained  without intravenous contrast. Patient declined intravenous contrast  administration.     Findings:   Small area of diffusion restriction within the right parietal lobe,  likely represents an acute infarct.    Severe atrophy predominantly at the frontotemporal lobes  and  perisylvian area and bifrontal lobe with tight sulci at the vertex  level. Confluent periventricular T2 hyperintensity and also scattered  foci of T2 hyperintensities within the bilateral cerebral white  matter. Enlargement of the lateral and third ventricles. Third  ventricle measures 1 cm. Callosal angle measures 62 degree.    Increase in extra-axial CSF space overlying the left cerebellar  convexity, measuring 4 x 1.7 cm, likely represents arachnoid cyst.    Punctate foci of susceptibility artifact at the central midbrain and  bryson.      Impression    Impression:   1. Small area of right parietal acute infarct.  2. Constellation of findings could suggest normal pressure  hydrocephalus in the appropriate clinical setting, although this may  just represent asymmetric cerebral volume loss.  3. Punctate foci of susceptibility artifact at the central midbrain  and bryson, likely related with chronic microhemorrhage.    [Critical Result: Small area of right parietal acute infarct .]    Finding was identified on 5/8/2021 10:31 AM. Patient was directed to  the ER.    ER Physician was contacted by Dr. Mccray at 5/8/2021 10:47 AM and  verbalized understanding of the critical finding.     I have personally reviewed the examination and initial interpretation  and I agree with the findings.    ELIA GABRIEL MD   US Lower Extremity Venous Duplex Left    Narrative    EXAMINATION: US LOWER EXTREMITY VENOUS DUPLEX LEFT  5/8/2021 1:35 PM      CLINICAL HISTORY: Swelling, evaluate for DVT    COMPARISON: None        PROCEDURE COMMENTS: Ultrasound was performed of the deep venous system  of the left lower extremity using grayscale, color, and spectral  Doppler.    FINDINGS:  The common femoral, greater saphenous origin, femoral, popliteal, and  deep calf veins are visualized and are patent. Venous waveforms are  normal. There is normal response to compression.      Impression    IMPRESSION:.  No deep vein thrombosis in the left  lower extremity.    I have personally reviewed the examination and initial interpretation  and I agree with the findings.    JUAN LUIS ELKINS MD   CT Head w/o Contrast    Narrative    CT HEAD W/O CONTRAST 5/8/2021 2:25 PM    History: N/A.     Comparison: Brain MRI from the same day earlier.    Technique: Using multidetector thin collimation helical acquisition  technique, axial, coronal and sagittal CT images from the skull base  to the vertex were obtained without intravenous contrast.   (topogram) image(s) also obtained and reviewed.    Findings:   Images are degraded by motion artifact.    Small right parietal infarct and punctate foci of chronic  microhemorrhages, previously visualized on MRI are not well visualized  on this study.    Frontotemporal predominance severe parenchymal atrophy with bilateral  with tight sulci at the vertex level. Severe periventricular  hypoattenuation. Large amount of the lateral and third ventricles.  Third ventricle measures 1.1 cm. Narrow callosal angle.    The bony calvaria and the bones of the skull base are normal. The  visualized portions of the paranasal sinuses and mastoid air cells are  clear.       Impression    Impression:  1. Small right parietal infarct, previously visualized on MRI is not  well visualized on this study.  2. Constellation of findings could suggest normal pressure  hydrocephalus in the appropriate clinical setting, although this may  just represent asymmetric cerebral volume loss.    I have personally reviewed the examination and initial interpretation  and I agree with the findings.    ELIA GABRIEL MD   CTA Head Neck with Contrast    Narrative    CTA  HEAD NECK WITH CONTRAST 5/8/2021 2:26 PM    CT angiogram of the neck   CT angiogram of the base of the brain with contrast  Reconstruction by the Radiologist on the 3D workstation    Provided History:  Code Stroke to evaluate for potential thrombolysis  and thrombectomy.  PLEASE READ  IMMEDIATELY.    Comparison:  None.      Technique:  HEAD and NECK CTA: During rapid bolus intravenous injection of  nonionic contrast material, axial images were obtained using thin  collimation multidetector helical technique from the base of the upper  aortic arch through the [place]. This CT angiogram data was  reconstructed at thin intervals with mild overlap. Images were sent to  the [employer] workstation, and 3D reconstructions were obtained. The  axial source images, multiplanar reformations, 3D reconstructions in  both maximum intensity projection display and volume rendered models  were reviewed, with reconstructions performed by the technologist and  the radiologist.    Contrast: 75ml isovue 370    Findings:  Head CTA demonstrates no aneurysm of the major intracranial arteries.  Severe stenosis of the distal P2 segment of left posterior cerebral  artery (series 6 image 189). Multiple stenosis within the anterior and  anterior branches of the bilateral middle cerebral arteries. Also  multiple severe stenosis within the A2 and A3 branches of the  bilateral anterior cerebral arteries (for example image 153 or 185).    Neck CTA demonstrates no stenosis of the major cervical arteries. The  origins of the great vessels from the aortic arch are patent. The  normal distal right internal carotid artery measures 5 mm. The normal  distal left internal carotid artery measures 5 mm.     No mass within the visualized portions of the cervical soft tissues or  lung apices.       Impression    Impression:    1. Head CTA demonstrates multiple severe stenosis within the distal  branches of bilateral middle, bilateral anterior, and left posterior  cerebral arteries, as described above. Findings are suggestive of  vasculitis. No aneurysm of the major intracranial arteries.   2. Neck CTA demonstrates no stenosis of the major cervical arteries.    I have personally reviewed the examination and initial interpretation  and I  "agree with the findings.    ELIA GABRIEL MD       Recent vital signs:   /84   Pulse 80   Temp 98  F (36.7  C) (Oral)   Resp 16   Ht 1.499 m (4' 11\")   Wt 63.5 kg (140 lb)   SpO2 98%   BMI 28.28 kg/m      Pao Coma Scale Score: 15 (05/08/21 1600)       Cardiac Rhythm: Normal Sinus with PACs  Pt needs tele? Yes  Skin/wound Issues: None    Code Status: Full Code    Pain control: pt had none    Nausea control: pt had none    Abnormal labs/tests/findings requiring intervention:     Family present during ED course? Yes   Family Comments/Social Situation comments:     Tasks needing completion: continue with nursing cares    Michell Barajas RN  ascom -- [phone]      "

## 2021-05-08 NOTE — ED TRIAGE NOTES
"Outpatient MRI performed today concerning for stroke. Referred to ED. Pt presented approx 1 month ago to neurology appt for balance problems, intermittent confusion, and \"something off\" as noted per family. Denies active pain. Oriented in triage.  "

## 2021-05-08 NOTE — CONSULTS
[facility]    Stroke Admission Note    Chief Complaint  Stroke    HPI  Ariana Barnes is a 81 year old female with PMH of possible Alzheimer dis and gait problem, HTN and DM presented with abnormal MRI concerning for acute ischemic stroke.    Per son, patient has been having dementia for years. About a year ago she started having some difficulty with walking and ambulation which got worse over the past month to the point that she was not able to walk at all. Per chart review was seen in neurology clinic on 4/821 for cognitive decline and gait problem. Was ordered MRI and lab works with the plan of starting Donepezil later. Per neurology note at [facility] she need s help with ADLs and has been incontinentofurine for a while. MRI was done today and showed small area of right parietal infarct. They contacted their primary neurologist and he recommended him to go to ED for stroke work-up.    Patient denies weakness, numbness, difficulty with speech, difficulty swallowing and vision changes.    TPA Treatment   Not given due to out of window.    Endovascular Treatment  Not initiated due to absence of proximal vessel occlusion    Impression  1. Ischemic Stroke due to undetermined etiology     Plan  Acute Ischemic Stroke (without tPA) Plan  - Admit to Neurology  - Neurochecks Q 2 hours  - CTA  - Permissive HTN; labetalol PRN for SBP > 220  - Avoid hypotonic IV fluids  - TTE with Bubble Study  - Telemetry, EKG  - Bedside Glucose Monitoring  - A1c, Lipid Panel, Troponin x 3  - PT/OT/SLP  - Stroke Education  - Depression Screen  - Apnea Screen  - Euthermia, Euglycemia      #Hx of DM  - Hold PTA Glipizide  - Sliding scale  - Hgb A1c      #Hx of HTN  - Hold PTA amlodipine, Carvedilol and Losartan      #Hx of HLP  -Cont PTA Lipitor 40 mg daily      Code Status  Full Code    During initial physical assessment, the plan of care was discussed and developed with patient and child.    Patient  was admitted via [facility] ED ([place])    The patient will be admitted to the Neuro Critical Care/Stroke team..     Rekha Lassiter MD  Neurology Resident PGY2  Pager [phone]    ___________________________________________________  Nutrition:  Orders Placed This Encounter      NPO for Medical/Clinical Reasons Except for: No Exceptions    Past Medical History   Past Medical History:   Diagnosis Date     Abdominal pain, chronic, right lower quadrant 12/22/2014     Dementia (H)      Depressive disorder      Diabetes mellitus (H)      Hypertension      Influenza 12/24/2014     Syncope 3/5/2020     Past Surgical History   History reviewed. No pertinent surgical history.  Medications   Home Meds  Prior to Admission medications    Medication Sig Start Date End Date Taking? Authorizing Provider   acetaminophen (TYLENOL) 325 MG tablet Take 325-650 mg by mouth every 6 hours as needed     Reported, Patient   amLODIPine (NORVASC) 10 MG tablet  3/12/21   Reported, Patient   aspirin 81 MG tablet Take 81 mg by mouth daily     Reported, Patient   atorvastatin (LIPITOR) 40 MG tablet Take 80 mg by mouth daily     Reported, Patient   CALCIUM PO Take 1 tablet by mouth daily     Unknown, Entered By History   carvedilol (COREG) 25 MG tablet  3/12/21   Reported, Patient   CEQUA 0.09 % SOLN  1/16/21   Reported, Patient   GLIPIZIDE PO Dose unknown    Unknown, Entered By History   losartan (COZAAR) 100 MG tablet Take 100 mg by mouth daily    Unknown, Entered By History   multivitamin, therapeutic with minerals (THERA-VIT-M) TABS Take 1 tablet by mouth daily    Reported, Patient   OYSTER SHELL CALCIUM/D 500-200 MG-UNIT tablet  2/12/21   Reported, Patient   OZEMPIC, 0.25 OR 0.5 MG/DOSE, 2 MG/1.5ML SOPN  3/12/21   Reported, Patient   vitamin D3 (CHOLECALCIFEROL) 2000 units (50 mcg) tablet Take 2,000 Units by mouth daily     Unknown, Entered By History   ziprasidone (GEODON) 40 MG capsule  1/31/21   Reported, Patient       Scheduled  Meds      Infusion Meds      PRN Meds      Allergies   Allergies   Allergen Reactions     Dicyclomine Unknown and Dizziness     Started at [facility] 10/17; d/c'd shortly after due to SEs     Hydrochlorothiazide Unknown     Stopped taking due to abdominal pain     Isosorbide Other (See Comments)     Severe leg pain  Leg pain  Severe leg pain       Family History   Family History   Problem Relation Age of Onset     Unknown/Adopted Mother      Unknown/Adopted Father      Social History   Social History     Tobacco Use     Smoking status: Never Smoker     Smokeless tobacco: Never Used   Substance Use Topics     Alcohol use: No     Drug use: No       Review of Systems   The 10 point Review of Systems is negative other than noted in the HPI or here.        PHYSICAL EXAMINATION  Temp:  [98  F (36.7  C)] 98  F (36.7  C)  Pulse:  [80] 80  Resp:  [16] 16  BP: (134)/(84) 134/84  SpO2:  [98 %] 98 %    General:  patient lying in bed without any acute distress    HEENT:  normocephalic/atraumatic  Cardio:  RRR  Pulmonary:  no respiratory distress  Abdomen:  soft  Extremities:  no edema  Skin:  intact     Neurologic  Mental Status:  alert, oriented x 3, follows commands, speech clear and fluent, naming and repetition normal  Cranial Nerves:  visual fields intact, PERRL, EOMI with normal smooth pursuit, facial sensation intact and symmetric, facial movements symmetric, hearing not formally tested but intact to conversation, palate elevation symmetric and uvula midline, no dysarthria, shoulder shrug strong bilaterally, tongue protrusion midline  Motor:  normal muscle tone and bulk, no abnormal movements, able to move all limbs spontaneously, strength 5/5 throughout upper and lower extremities, no pronator drift  Reflexes:  toes down-going  Sensory:  light touch sensation intact and symmetric throughout upper and lower extremities, no extinction on double simultaneous stimulation   Coordination:  normal finger-to-nose and heel-to-shin  bilaterally without dysmetria, rapid alternating movements symmetric  Station/Gait:  deferred    Dysphagia Screen  Per Nursing    Modified Chantelle Scale (pre-stroke):   5-Severe disability; bedridden, incontinent and requiring constant nursing care and attention     Stroke Scales  National Institutes of Health Stroke Scale  Exam Interval: Baseline   Score    Level of consciousness: (0)   Alert, keenly responsive    LOC questions: (2)   Answers neither question correctly    LOC commands: (0)   Performs both tasks correctly    Best gaze: (0)   Normal    Visual: (0)   No visual loss    Facial palsy: (1)   Minor paralysis (flat nasolabial fold, smile asymmetry)    Motor arm (left): (0)   No drift    Motor arm (right): (0)   No drift    Motor leg (left): (0)   No drift    Motor leg (right): (0)   No drift    Limb ataxia: (0)   Absent    Sensory: (0)   Normal- no sensory loss    Best language: (0)   Normal- no aphasia    Dysarthria: (0)   Normal    Extinction and inattention: (0)   No abnormality        Total Score:  3       Imaging  I personally reviewed all imaging; relevant findings per the HPI.    Lab Results Data   CBC  No results for input(s): WBC, RBC, HGB, HCT, PLT in the last 168 hours.  Basic Metabolic Panel   No results for input(s): NA, POTASSIUM, CHLORIDE, CO2, BUN, CR, GLC, ELLI in the last 168 hours.  Liver Panel  No results for input(s): PROTTOTAL, ALBUMIN, BILITOTAL, ALKPHOS, AST, ALT, BILIDIRECT in the last 168 hours.  INR  No lab results found.   Lipid Profile  No lab results found.  A1C    Recent Labs   Lab Test 12/25/14  0559   A1C 7.0*     Troponin I  No results for input(s): TROPI in the last 168 hours.       Stroke Code / Stroke Consult Data Data    Not a stroke code

## 2021-05-08 NOTE — ED PROVIDER NOTES
ED Provider Note  [facility]      History     Chief Complaint   Patient presents with     Cerebrovascular Accident     The history is provided by the patient, medical records and a relative. The history is limited by a language barrier. No  was used (family interpreting).     Ariana Barnes is a 81 year old female with a history of dementia, type 2 diabetes mellitus, HTN, and HLD who presents to the Emergency Department for further evaluation following MRI concerning for stroke. Patient's family reports they noticed patient was having balance and walking difficulties and made an appointment with neurology on 4/8. They advised patient have an MRI. Patient had outpatient MR of the brain done today and was found to have small acute right parietal lobe infarct and patient was sent here to the ED. Patient's family denies new difficulty with walking, taking, or moving extremities. Patient walks with a walker and family notes she walks slow and sometimes freezes and cannot move. Patient denies difficulty urinating. Family reports patient wears diapers and has issues controlling her bladder. He states patient is sometimes able to make it to the bathroom. This has been ongoing for years, no recent change. Family notes patient has had left leg swelling.    MRI Brain today:    RESIDENT PRELIMINARY INTERPRETATION  Impression:   1. Small area of right parietal acute infarct.  2. Constellation of findings is suggestive for normal pressure  hydrocephalus in the appropriate clinical setting.  3. Punctate foci of susceptibility artifact at the central midbrain  and bryson, likely related with chronic microhemorrhage or tiny capsular  telangiectasia.       Past Medical History  Past Medical History:   Diagnosis Date     Abdominal pain, chronic, right lower quadrant 12/22/2014     Dementia (H)      Depressive disorder      Diabetes mellitus (H)      Hypertension      Influenza 12/24/2014      Syncope 3/5/2020     History reviewed. No pertinent surgical history.  acetaminophen (TYLENOL) 325 MG tablet  amLODIPine (NORVASC) 10 MG tablet  aspirin 81 MG tablet  atorvastatin (LIPITOR) 40 MG tablet  CALCIUM PO  carvedilol (COREG) 25 MG tablet  CEQUA 0.09 % SOLN  GLIPIZIDE PO  losartan (COZAAR) 100 MG tablet  multivitamin, therapeutic with minerals (THERA-VIT-M) TABS  OYSTER SHELL CALCIUM/D 500-200 MG-UNIT tablet  OZEMPIC, 0.25 OR 0.5 MG/DOSE, 2 MG/1.5ML SOPN  vitamin D3 (CHOLECALCIFEROL) 2000 units (50 mcg) tablet  ziprasidone (GEODON) 40 MG capsule      Allergies   Allergen Reactions     Dicyclomine Unknown and Dizziness     Started at [facility] 10/17; d/c'd shortly after due to SEs     Hydrochlorothiazide Unknown     Stopped taking due to abdominal pain     Isosorbide Other (See Comments)     Severe leg pain  Leg pain  Severe leg pain       Family History  Family History   Problem Relation Age of Onset     Unknown/Adopted Mother      Unknown/Adopted Father      Social History   Social History     Tobacco Use     Smoking status: Never Smoker     Smokeless tobacco: Never Used   Substance Use Topics     Alcohol use: No     Drug use: No      Past medical history, past surgical history, medications, allergies, family history, and social history were reviewed with the patient. No additional pertinent items.       Review of Systems   Constitutional: Negative for fever.   HENT: Negative for congestion.    Eyes: Negative for redness.   Respiratory: Negative for shortness of breath.    Cardiovascular: Positive for leg swelling (left). Negative for chest pain.   Gastrointestinal: Negative for abdominal pain.   Genitourinary: Positive for enuresis (chronic). Negative for difficulty urinating.   Musculoskeletal: Positive for gait problem (chronic). Negative for arthralgias and neck stiffness.   Skin: Negative for color change.   Neurological: Negative for headaches.   Psychiatric/Behavioral: Negative for confusion.  "  All other systems reviewed and are negative.    A complete review of systems was performed with pertinent positives and negatives noted in the HPI, and all other systems negative.    Physical Exam   BP: 134/84  Pulse: 80  Temp: 98  F (36.7  C)  Resp: 16  Height: 149.9 cm (4' 11\")  Weight: 63.5 kg (140 lb)  SpO2: 98 %  Physical Exam  Vitals signs and nursing note reviewed.   Constitutional:       General: She is not in acute distress.     Appearance: She is not diaphoretic.   HENT:      Head: Atraumatic.      Mouth/Throat:      Pharynx: No oropharyngeal exudate.   Eyes:      General: No scleral icterus.     Pupils: Pupils are equal, round, and reactive to light.   Cardiovascular:      Heart sounds: Normal heart sounds.   Pulmonary:      Effort: No respiratory distress.      Breath sounds: Normal breath sounds.   Abdominal:      General: Bowel sounds are normal.      Palpations: Abdomen is soft.      Tenderness: There is no abdominal tenderness.   Musculoskeletal:         General: No tenderness.   Skin:     General: Skin is warm.      Findings: No rash.   Neurological:      Mental Status: She is disoriented.      Cranial Nerves: No cranial nerve deficit.      Sensory: No sensory deficit.      Motor: No weakness.      Coordination: Coordination normal.      Comments: Disoriented to time         ED Course   11:43 AM  The patient was seen and examined by Abdulaziz Garduno MD in Room ED16.      Procedures             EKG Interpretation:      Interpreted by ABDULAZIZ GARDUNO MD, MD  Time reviewed: 1215  Symptoms at time of EKG: Stroke   Rhythm: normal sinus   Rate: 82  Axis: Normal  Ectopy: PACs  Conduction: normal  ST Segments/ T Waves: No acute ischemic changes  Q Waves: none  Comparison to prior: Unchanged    Clinical Impression: no acute changes and non-specific EKG        Results for orders placed or performed during the hospital encounter of 05/08/21    Lower Extremity Venous Duplex Left     Status: None (Preliminary " result)    Impression    RESIDENT PRELIMINARY INTERPRETATION  IMPRESSION:.  No deep vein thrombosis in the left lower extremity.   CT Head w/o Contrast     Status: None (Preliminary result)    Impression    RESIDENT PRELIMINARY INTERPRETATION  Impression:  1. Small right parietal infarct, previously visualized on MRI is not  well visualized on this study.  2. Constellation of findings could suggest normal pressure  hydrocephalus in the appropriate clinical setting, although this may  just represent asymmetric cerebral volume loss.     CBC with Platelets & Differential     Status: None   Result Value Ref Range    WBC 9.4 4.0 - 11.0 10e9/L    RBC Count 4.22 3.8 - 5.2 10e12/L    Hemoglobin 12.5 11.7 - 15.7 g/dL    Hematocrit 38.3 35.0 - 47.0 %    MCV 91 78 - 100 fl    MCH 29.6 26.5 - 33.0 pg    MCHC 32.6 31.5 - 36.5 g/dL    RDW 13.2 10.0 - 15.0 %    Platelet Count 322 150 - 450 10e9/L    Diff Method Automated Method     % Neutrophils 58.7 %    % Lymphocytes 31.0 %    % Monocytes 5.9 %    % Eosinophils 3.5 %    % Basophils 0.7 %    % Immature Granulocytes 0.2 %    Nucleated RBCs 0 0 /100    Absolute Neutrophil 5.5 1.6 - 8.3 10e9/L    Absolute Lymphocytes 2.9 0.8 - 5.3 10e9/L    Absolute Monocytes 0.6 0.0 - 1.3 10e9/L    Absolute Eosinophils 0.3 0.0 - 0.7 10e9/L    Absolute Basophils 0.1 0.0 - 0.2 10e9/L    Abs Immature Granulocytes 0.0 0 - 0.4 10e9/L    Absolute Nucleated RBC 0.0    Basic metabolic panel     Status: Abnormal   Result Value Ref Range    Sodium 139 133 - 144 mmol/L    Potassium 3.5 3.4 - 5.3 mmol/L    Chloride 104 94 - 109 mmol/L    Carbon Dioxide 30 20 - 32 mmol/L    Anion Gap 5 3 - 14 mmol/L    Glucose 136 (H) 70 - 99 mg/dL    Urea Nitrogen 19 7 - 30 mg/dL    Creatinine 0.76 0.52 - 1.04 mg/dL    GFR Estimate 73 >60 mL/min/[1.73_m2]    GFR Estimate If Black 85 >60 mL/min/[1.73_m2]    Calcium 9.4 8.5 - 10.1 mg/dL   INR     Status: None   Result Value Ref Range    INR 0.96 0.86 - 1.14   Partial  thromboplastin time     Status: None   Result Value Ref Range    PTT 27 22 - 37 sec   Troponin I     Status: None   Result Value Ref Range    Troponin I ES <0.015 0.000 - 0.045 ug/L   UA with Microscopic     Status: Abnormal   Result Value Ref Range    Color Urine Yellow     Appearance Urine Clear     Glucose Urine Negative NEG^Negative mg/dL    Bilirubin Urine Negative NEG^Negative    Ketones Urine Negative NEG^Negative mg/dL    Specific Gravity Urine 1.014 1.003 - 1.035    Blood Urine Negative NEG^Negative    pH Urine 6.0 5.0 - 7.0 pH    Protein Albumin Urine Negative NEG^Negative mg/dL    Urobilinogen mg/dL Normal 0.0 - 2.0 mg/dL    Nitrite Urine Negative NEG^Negative    Leukocyte Esterase Urine Negative NEG^Negative    Source Clean catch urine     WBC Urine 1 0 - 5 /HPF    RBC Urine 1 0 - 2 /HPF    Squamous Epithelial /HPF Urine 2 (H) 0 - 1 /HPF    Mucous Urine Present (A) NEG^Negative /LPF   EKG 12-lead, tracing only     Status: None (Preliminary result)   Result Value Ref Range    Interpretation ECG Click View Image link to view waveform and result    Results for orders placed or performed in visit on 05/08/21   MR Brain w/o Contrast     Status: Abnormal   Result Value Ref Range    Radiologist flags Small area of right parietal acute infarct . (AA)     Narrative    Brain MRI without contrast, 5/8/2021 10:49 AM    Provided History: Memory loss, neuro cognitive disorder.    Per EPIC: Patient with likely dementia that is quite progressed with  significant impairment in ADL's. Outside labs completed thus far have  not revealed obvious etiology. This likely represents Alzheimer  dementia, but patient also has risk factors with hypertension and type  2 diabetes for vascular dementia or mixed picture. History of  psychiatric diagnosis on antipsychotics for many years with history of  tardive dyskinesia does complicate picture somewhat.    Comparison:  None.     Technique: Sagittal T1-weighted and axial T2-weighted,  turboFLAIR and  diffusion-weighted with ADC map images of the brain were obtained  without intravenous contrast. Patient declined intravenous contrast  administration.     Findings:   Small area of diffusion restriction within the right parietal lobe,  likely represents an acute infarct.    Severe atrophy predominantly at the frontotemporal lobes and  perisylvian area and bifrontal lobe with tight sulci at the vertex  level. Confluent periventricular T2 hyperintensity and also scattered  foci of T2 hyperintensities within the bilateral cerebral white  matter. Enlargement of the lateral and third ventricles. Third  ventricle measures 1 cm. Callosal angle measures 62 degree.    Increase in extra-axial CSF space overlying the left cerebellar  convexity, measuring 4 x 1.7 cm, likely represents arachnoid cyst.    Punctate foci of susceptibility artifact at the central midbrain and  bryson.      Impression    Impression:   1. Small area of right parietal acute infarct.  2. Constellation of findings could suggest normal pressure  hydrocephalus in the appropriate clinical setting, although this may  just represent asymmetric cerebral volume loss.  3. Punctate foci of susceptibility artifact at the central midbrain  and bryson, likely related with chronic microhemorrhage.    [Critical Result: Small area of right parietal acute infarct .]    Finding was identified on 5/8/2021 10:31 AM. Patient was directed to  the ER.    ER Physician was contacted by Dr. Mccray at 5/8/2021 10:47 AM and  verbalized understanding of the critical finding.     I have personally reviewed the examination and initial interpretation  and I agree with the findings.    ELIA GABRIEL MD     Medications   0.9% sodium chloride BOLUS (has no administration in time range)   iopamidol (ISOVUE-370) solution 75 mL (75 mLs Intravenous Given 5/8/21 1420)   sodium chloride (PF) 0.9% PF flush 90 mL (90 mLs Intravenous Given 5/8/21 1421)     Neurology stroke consult  obtained.     Assessments & Plan (with Medical Decision Making)   81 year old female to the emergency department from neuroradiology MRI for evaluation of an acute right parietal stroke identified on MRI of the brain today.  The patient was receiving the MRI due to memory problems and balance problems that she has had for several months.  The patient denied any acute symptoms.  Her neurologic examination is nonfocal.  Neurology stroke service was consulted and CTA obtained with no significant vascular abnormality identified.  Imaging also suggestive of normal pressure hydrocephalus.  Differential diagnosis includes both stroke and normal pressure hydrocephalus.  The patient was seen by neurology and will be admitted for further evaluation and management.    I have reviewed the nursing notes. I have reviewed the findings, diagnosis, plan and need for follow up with the patient.    New Prescriptions    No medications on file       Final diagnoses:   Cerebrovascular accident (CVA), unspecified mechanism (H)     I, Mago Leal, am serving as a trained medical scribe to document services personally performed by Abdulaziz Suazo MD, based on the provider's statements to me.      IAbdulaziz MD, was physically present and have reviewed and verified the accuracy of this note documented by Mago Leal.     --  Abdulaziz Suazo MD  [facility] EMERGENCY DEPARTMENT  5/8/2021     Abdulaziz Suazo MD  05/08/21 2892

## 2021-05-08 NOTE — PROGRESS NOTES
Brief Provider note:    I was called by the MRI technologist at the [facility] to discuss a finding on MR of the brain seen by the neuroradiology fellow. Per my own review and discussion with the neuroradiology fellow, there appears to be a small acute right parietal lobe infarct on the patients MRI. The neuroradiology fellow recommended the patient be evaluated by the ER, and I agreed to inform Ms. Barnes of the concern for a stroke and recommendation. On arrival to the scanner the patient was noted to get up from the MRI table and walk with assistance. Per discussion with the patient (via ) and her son, she sought treatment from a neurologist primarily for deterioration in gait following COVID infection in June 2020. However, no new symptoms today. Both the patient and her son are agreeable to be evaluated in the ER. Notably, the patient declined the contrast enhanced portion of today's MRI exam.    Patient is in no apparent distress, breathing easily on room air. Able to ambulate with assistance. Patient asked appropriate questions during the interview via the  and her son.    The patient and her son declined transport via ambulance, and elected to go to the [place] ER via their private vehicle. Dr. Mccray (neuroradiology fellow) to notify the ER prior to their arrival. Patient leaving the [facility] in stable condition without reporting any new onset or change of symptoms today despite the finding on the MRI. Further evaluation and care to be determined by the Emergency Department.    Shawn Kim MD on 5/8/2021 at 11:05 AM

## 2021-05-08 NOTE — H&P
[facility]    Stroke Admission Note    Chief Complaint  Stroke    HPI  Ariana Barnes is a 81 year old female with PMH of possible Alzheimer dis and gait problem, HTN and DM presented with abnormal MRI concerning for acute ischemic stroke.    Per son, patient has been having dementia for years. About a year ago she started having some difficulty with walking and ambulation which got worse over the past month to the point that she was not able to walk at all. Per chart review was seen in neurology clinic on 4/821 for cognitive decline and gait problem. Was ordered MRI and lab works with the plan of starting Donepezil later. Per neurology note at [facility] she need s help with ADLs and has been incontinentofurine for a while. MRI was done today and showed small area of right parietal infarct. They contacted their primary neurologist and he recommended him to go to ED for stroke work-up.    Patient denies weakness, numbness, difficulty with speech, difficulty swallowing and vision changes.    TPA Treatment   Not given due to out of window.    Endovascular Treatment  Not initiated due to absence of proximal vessel occlusion    Impression  1. Ischemic Stroke due to undetermined etiology     Plan  Acute Ischemic Stroke (without tPA) Plan  - Admit to Neurology  - Neurochecks Q 2 hours  - CTA  - Permissive HTN; labetalol PRN for SBP > 220  - Avoid hypotonic IV fluids  - TTE with Bubble Study  - Telemetry, EKG  - Bedside Glucose Monitoring  - A1c, Lipid Panel, Troponin x 3  - PT/OT/SLP  - Stroke Education  - Depression Screen  - Apnea Screen  - Euthermia, Euglycemia      #Hx of DM  - Hold PTA Glipizide  - Sliding scale  - Hgb A1c      #Hx of HTN  - Hold PTA amlodipine, Carvedilol and Losartan      #Hx of HLP  -Cont PTA Lipitor 40 mg daily      Code Status  Full Code    During initial physical assessment, the plan of care was discussed and developed with patient and child.    Patient  was admitted via [facility] ED ([place])    The patient will be admitted to the Neuro Critical Care/Stroke team..     Rekha Lassiter MD  Neurology Resident PGY2  Pager [phone]

## 2021-05-08 NOTE — ED NOTES
Pt refused covid testing. I explained to patient hospital policy and reason for the test. Pt still refused. MD notified.

## 2021-05-09 NOTE — ED NOTES
Paged inpatient MD again regarding pt still wanting to discharge home.    Subjective:       Patient ID: Sandee Evans is a 65 y.o. female.    Chief Complaint: Knee Pain (left knee pain - follow up ) and Allergies (follow up )    HPI    Depression - Pt was without her medication for a few days without her zoloft due rx expiration. She had a rough few days initially, but she has been doing very well over the last few days and no longer believes that she needs zoloft. No depression or anxiety, no HI or SI.     Htn - - chronic - stable - pt is adherent with their medications,no refill. No side effects noted. No complaints today.     Chronic Atrial Fibrillation - Pt is adherent with her medications without side effects.       Current Outpatient Medications on File Prior to Visit   Medication Sig Dispense Refill    albuterol 90 mcg/actuation inhaler Inhale 2 puffs into the lungs every 6 (six) hours as needed for Wheezing. 1 each 11    fluticasone propionate (FLONASE) 50 mcg/actuation nasal spray USE 2 SPRAYS IN EACH NOSTRIL ONCE DAILY 16 mL 6    metFORMIN (GLUCOPHAGE) 500 MG tablet Take 1 tablet (500 mg total) by mouth 2 (two) times daily with meals. 180 tablet 2    metoprolol tartrate (LOPRESSOR) 25 MG tablet Take 1 tablet (25 mg total) by mouth 2 (two) times daily. 60 tablet 3    spironolactone (ALDACTONE) 25 MG tablet TAKE 1 TABLET(25 MG) BY MOUTH EVERY DAY 90 tablet 2    XARELTO 20 mg Tab Take 20 mg by mouth once daily.  11    [DISCONTINUED] simvastatin (ZOCOR) 20 MG tablet TAKE 1 TABLET(20 MG) BY MOUTH EVERY EVENING 90 tablet 0    [DISCONTINUED] triamcinolone acetonide 0.1% (KENALOG) 0.1 % ointment APPLY BETWEEN KNEES AND UPPER THIGHS TWICE DAILY FOR NO MORE THAN 7-14 DAYS 454 g 0    [DISCONTINUED] polyethylene glycol (COLYTE) 240-22.72-6.72 -5.84 gram SolR TK 400O ML PO  ONCE  0    [DISCONTINUED] rivaroxaban (XARELTO) 10 mg Tab Take 1 tablet (10 mg total) by mouth once daily. 21 tablet 0    [DISCONTINUED] sertraline (ZOLOFT) 100 MG tablet TAKE 1 TABLET(100 MG) BY MOUTH EVERY  DAY 90 tablet 1    [DISCONTINUED] traMADol (ULTRAM) 50 mg tablet Take 1 tablet (50 mg total) by mouth every 12 (twelve) hours as needed for Pain. 6 tablet 0     No current facility-administered medications on file prior to visit.        Past Medical History:   Diagnosis Date    Anticoagulant long-term use     Xarelto    Arthritis     Atrial fibrillation     Breast cyst     Degenerative disc disease     Depression     Diabetes mellitus     Pre diabetic    Hyperlipidemia     Hypertension     Nuclear sclerosis, bilateral 12/18/2017    Obesity, morbid     Other abnormal glucose     pre-diabetes    Sleep apnea     Smoker     Thyroid disease     on meds 8-9 years ago. hypothyroidism.no malignancy    TMJ (temporomandibular joint disorder)     jaw clicking    Urge incontinence     Wears glasses        Family History   Problem Relation Age of Onset    Diabetes Mother     Diabetes Brother     No Known Problems Father     No Known Problems Sister     No Known Problems Maternal Aunt     No Known Problems Maternal Uncle     No Known Problems Paternal Aunt     No Known Problems Paternal Uncle     No Known Problems Maternal Grandmother     No Known Problems Maternal Grandfather     No Known Problems Paternal Grandmother     No Known Problems Paternal Grandfather     Amblyopia Neg Hx     Blindness Neg Hx     Cancer Neg Hx     Cataracts Neg Hx     Glaucoma Neg Hx     Hypertension Neg Hx     Macular degeneration Neg Hx     Retinal detachment Neg Hx     Strabismus Neg Hx     Stroke Neg Hx     Thyroid disease Neg Hx         reports that she has been smoking cigarettes.  She started smoking about 46 years ago. She has been smoking about 0.41 packs per day. She has never used smokeless tobacco. She reports that she does not drink alcohol or use drugs.    Review of Systems   Constitutional: Negative for activity change and unexpected weight change.   HENT: Negative for hearing loss, rhinorrhea and  trouble swallowing.    Eyes: Positive for discharge and visual disturbance.   Respiratory: Negative for chest tightness and wheezing.    Cardiovascular: Negative for chest pain and palpitations.   Gastrointestinal: Positive for constipation. Negative for blood in stool, diarrhea and vomiting.   Endocrine: Negative for polydipsia and polyuria.   Genitourinary: Negative for difficulty urinating, dysuria, hematuria and menstrual problem.   Musculoskeletal: Positive for arthralgias and joint swelling. Negative for neck pain.   Neurological: Positive for headaches. Negative for weakness.   Psychiatric/Behavioral: Negative for confusion and dysphoric mood.       Objective:     Vitals:    07/24/19 1409   BP: 124/80   Pulse: 75   Resp: 20   Temp: 98.4 °F (36.9 °C)        Physical Exam   Constitutional: She appears well-developed. No distress.   MO   HENT:   Head: Normocephalic and atraumatic.   Eyes: Conjunctivae are normal. No scleral icterus.   Pulmonary/Chest: Effort normal.   Neurological: She is alert.   Skin: She is not diaphoretic.   Psychiatric: She has a normal mood and affect. Her behavior is normal.   Vitals reviewed.      Assessment:       1. Current mild episode of major depressive disorder without prior episode    2. Chronic atrial fibrillation    3. Essential hypertension    4. Skin lesion    5. Hyperlipidemia, unspecified hyperlipidemia type        Plan:       Sandee was seen today for knee pain and allergies.    Diagnoses and all orders for this visit:    Current mild episode of major depressive disorder without prior episode  Patient is doing well without Zoloft.  Patient will inform us of any new or worsening symptoms.  Patient understands that she can't restart Zoloft in the future if needed but to keep her primary care team up-to-date of her needs.    Chronic atrial fibrillation  Chronic - stable - continue Xarelto - patient asked to call arrange follow up cardiology office as her previous cardiologist  has left system.  Essential hypertension  - Chronic - stable     Pt is doing well on current therapy. No side effects noted. Will continue current therapy.    Skin lesion  -     triamcinolone acetonide 0.1% (KENALOG) 0.1 % ointment; APPLY BETWEEN KNEES AND UPPER THIGHS TWICE DAILY FOR NO MORE THAN 7-14 DAYS  - Chronic - stable     Pt is doing well on current therapy. No side effects noted. Will continue current therapy.  Patient asked to limit the use and to work on weight loss which will help irritation between size due to friction.    Hyperlipidemia, unspecified hyperlipidemia type  -     simvastatin (ZOCOR) 20 MG tablet; TAKE 1 TABLET(20 MG) BY MOUTH EVERY EVENING            Follow up in about 3 months (around 10/24/2019) for establish care - htn, hld, establish care.        Pt verbalized understanding and agreed with our plan.

## 2021-05-09 NOTE — DISCHARGE INSTRUCTIONS
You have chosen to be discharged despite recommendations for admission to the hospital.  Please make a follow-up appointment in the neurology clinic as soon as possible  Please make an appointment to follow up with Neurology Clinic (phone: [phone]) as soon as possible.  Return to the ER if you are having new or worsening symptoms

## 2021-05-09 NOTE — PROGRESS NOTES
7:38 PM I was informed by neurology of the patient's desire to be discharged.  I recommendation is timely follow-up in the neurology clinic to do a stroke work-up.  She was also informed she may return to the emergency department at any time.

## 2021-05-17 ENCOUNTER — OFFICE VISIT (OUTPATIENT)
Dept: NEUROLOGY | Facility: CLINIC | Age: 81
End: 2021-05-17
Payer: COMMERCIAL

## 2021-05-17 VITALS
SYSTOLIC BLOOD PRESSURE: 107 MMHG | HEART RATE: 93 BPM | WEIGHT: 140.4 LBS | BODY MASS INDEX: 27.56 KG/M2 | DIASTOLIC BLOOD PRESSURE: 63 MMHG | HEIGHT: 60 IN

## 2021-05-17 DIAGNOSIS — I77.6 CNS VASCULITIS (H): ICD-10-CM

## 2021-05-17 DIAGNOSIS — F02.80 ALZHEIMER'S TYPE DEMENTIA WITH LATE ONSET WITHOUT BEHAVIORAL DISTURBANCE (H): ICD-10-CM

## 2021-05-17 DIAGNOSIS — G30.1 ALZHEIMER'S TYPE DEMENTIA WITH LATE ONSET WITHOUT BEHAVIORAL DISTURBANCE (H): ICD-10-CM

## 2021-05-17 DIAGNOSIS — I63.9 CEREBROVASCULAR ACCIDENT (CVA), UNSPECIFIED MECHANISM (H): Primary | ICD-10-CM

## 2021-05-17 PROBLEM — M19.90 OSTEOARTHROSIS: Status: ACTIVE | Noted: 2020-09-24

## 2021-05-17 PROCEDURE — 99215 OFFICE O/P EST HI 40 MIN: CPT | Performed by: PSYCHIATRY & NEUROLOGY

## 2021-05-17 RX ORDER — DONEPEZIL HYDROCHLORIDE 5 MG/1
5 TABLET, FILM COATED ORAL AT BEDTIME
Qty: 30 TABLET | Refills: 1 | Status: SHIPPED | OUTPATIENT
Start: 2021-05-17

## 2021-05-17 ASSESSMENT — MIFFLIN-ST. JEOR: SCORE: 1023.35

## 2021-05-17 NOTE — PROGRESS NOTES
NEUROLOGY FOLLOW UP VISIT  NOTE       [facility] [place]  [address] [place] [address]  Tel: [phone]  Fax: [phone]  [email or web address]     Ariana Barnes DOB 1940, MRN [id number]  PCP: [facility]  Date: 2021      ASSESSMENT & PLAN     Diagnosis code  1. Alzheimer's type dementia with late onset without behavioral disturbance (H)  2. Right parietal infarction     Late onset Alzheimer's dementia  81-year-old Somalian female with history of type 2 diabetes, HTN, HLD who was initially evaluated on 2021 with progressive cognitive decline.  I suspected she has late onset Alzheimer's dementia and that she had lab work for common causes of cognitive decline that was unremarkable.  MRI showed right parietal infarct and she was seen in the emergency room and had a CT of the head and CTA that raise the possibility of CNS vasculitis.  She was discharged and asked to follow-up with neurology.  I have recommended starting Aricept 5 mg daily and after 1 month I will increase the dose to 10 mg daily.  Follow-up will be in 1 month    Right parietal infarction; R/O CNS vasculitis  81-year-old female with history of HTN, HLD, type 2 diabetes who had a MRI of the brain recently as part of work-up for cognitive decline and it showed right parietal acute infarct.  Patient had no symptoms and was briefly seen in the emergency room that day.  CT of the head and CTA was done that showed severe stenosis within distal branches of bilateral middle, anterior and left posterior cerebral arteries and question of CNS vasculitis was raised.  Neck arteries did not show any hemodynamically significant stenosis.  I have recommended:    1.  Cerebral angiogram to rule out CNS vasculitis  2.  Check antinuclear antibody, ANCA, rheumatoid factor and lipid profile  3.  Continue aspirin and Lipitor with goal of LDL less than 70  4.  Echocardiogram  5.   Follow-up in 1 month    Thank you again for this referral, please feel free to contact me if you have any questions.    Charly Taylor MD  [facility]  (Formerly, [facility]     HISTORY OF PRESENT ILLNESS     Patient is a 81-year-old female with history of type 2 diabetes, HTN, HLD who was initially evaluated in our clinic on 4/8/2021 with complaint of progressive cognitive decline that got worse in the last 6 months.  Family noticed that after she got Covid vaccine her cognitive decline accelerated.  Patient was unable to do many activities of daily living and required full assistance with dressing, grooming, cooking and finances.  At times she is incontinent of urine.  Most of her symptoms are for short-term memory her long-term memory at time is relatively well.  Past medical history significant for psychiatric admission in 1996 and 1997 when she was put on antipsychotic but daughter is not sure if any diagnosis was made.  I suspected late onset Alzheimer's dementia and MRI of the brain was ordered that surprisingly showed a right parietal infarct.  She was subsequently seen in the emergency room and had CT of the brain that showed multiple severe stenosis within the distal branches of the bilateral middle, anterior and left posterior cerebral arteries.  Question of CNS vasculitis was raised.  No hemodynamically significant stenosis was noted in the carotids.  She was discharged and asked to follow-up with neurology.  Part of work-up for her cognitive decline also included normal lab work that included normal folate, vitamin B1, methylmalonic acid level, alpha-tocopherol.  MRI of the brain raised the possibility of normal pressure encephalitis.  Since her last visit she reports no significant change in her strength.  According to daughter patient continues to struggle with short-term memory and tends to forget things easily.  She does not have significant  difficulty with balance but does have bladder incontinence.  No history of any behavioral disturbances     PROBLEM LIST   Patient Active Problem List   Diagnosis Code     Essential hypertension I10     DM (diabetes mellitus), type 2 with peripheral vascular complications (H) E11.51     Hyperlipidemia E78.5     Depression F32.9     Compression fracture of L1 lumbar vertebra (H) S32.010A     Coronary artery disease involving native coronary artery with angina pectoris (H) I25.119     IBS (irritable bowel syndrome) K58.9     Right parietal infarction I63.9     Osteoarthrosis M19.90     Alzheimer's type dementia with late onset without behavioral disturbance (H) G30.1, F02.80         PAST MEDICAL & SURGICAL HISTORY     Past Medical History:   Patient  has a past medical history of Abdominal pain, chronic, right lower quadrant (12/22/2014), Dementia (H), Depressive disorder, Diabetes mellitus (H), Hypertension, Influenza (12/24/2014), and Syncope (3/5/2020).    Surgical History:  She  has no past surgical history on file.     SOCIAL HISTORY     Reviewed, and she  reports that she has never smoked. She has never used smokeless tobacco. She reports that she does not drink alcohol or use drugs.     FAMILY HISTORY     Reviewed, and family history includes Unknown/Adopted in her father and mother.     ALLERGIES     Allergies   Allergen Reactions     Dicyclomine Unknown and Dizziness     Started at [facility] 10/17; d/c'd shortly after due to SEs     Hydrochlorothiazide Unknown     Stopped taking due to abdominal pain     Isosorbide Other (See Comments)     Severe leg pain  Leg pain  Severe leg pain           REVIEW OF SYSTEMS     A 12 point review of system was performed and was negative except as outlined in the history of present illness.     HOME MEDICATIONS     Current Outpatient Rx   Medication Sig Dispense Refill     acetaminophen (TYLENOL) 325 MG tablet Take 325-650 mg by mouth every 6 hours as needed        amLODIPine  (NORVASC) 10 MG tablet        aspirin 81 MG tablet Take 81 mg by mouth daily        atorvastatin (LIPITOR) 40 MG tablet Take 80 mg by mouth daily        CALCIUM PO Take 1 tablet by mouth daily        carvedilol (COREG) 25 MG tablet        CEQUA 0.09 % SOLN        donepezil (ARICEPT) 5 MG tablet Take 1 tablet (5 mg) by mouth At Bedtime 30 tablet 1     GLIPIZIDE PO Dose unknown       losartan (COZAAR) 100 MG tablet Take 100 mg by mouth daily       multivitamin, therapeutic with minerals (THERA-VIT-M) TABS Take 1 tablet by mouth daily       OYSTER SHELL CALCIUM/D 500-200 MG-UNIT tablet        OZEMPIC, 0.25 OR 0.5 MG/DOSE, 2 MG/1.5ML SOPN        vitamin D3 (CHOLECALCIFEROL) 2000 units (50 mcg) tablet Take 2,000 Units by mouth daily        ziprasidone (GEODON) 40 MG capsule            PHYSICAL EXAM     Vital signs  /63   Pulse 93   Ht 1.524 m (5')   Wt 63.7 kg (140 lb 6.4 oz)   BMI 27.42 kg/m      Weight:   140 lbs 6.4 oz    Patient is alert and oriented to person, but not place, time. In no acute distress. Vital signs were reviewed and are documented in electronic medical record. Neck was supple, no carotid bruits, JVD, or lymphadenopathy was noted.   NEUROLOGY EXAM:    Patient s speech was normal with no aphasia or dysarthria.     Short-term memory impaired with inability to remember 3 words on mini-cog exam. Unable to draw clock. Named foods she ate for breakfast, but daughter notes these were incorrect.     Cranial nerves II -XII were intact other than difficulty understanding testing for CN XI    Patient had normal mass, tone and motor strength was 5/5 in all extremities without pronator drift.      Sensation was intact to light touch intact.    Reflexes were 1+ symmetrical with downgoing toes.     Noted to be rubbing at thumbs bilaterally using index finger     Difficulty following commands for FNF testing.     Gait testing was abnormal with very slow gait. Initially stood up and was unable to walk  forward.      DIAGNOSTIC STUDIES     PERTINENT RADIOLOGY  Following imaging studies were reviewed:     CT, CTA BRAIN 5/8/2021  1. Head CTA demonstrates multiple severe stenosis within the distal  branches of bilateral middle, bilateral anterior, and left posterior  cerebral arteries, as described above. Findings are suggestive of  vasculitis. No aneurysm of the major intracranial arteries.   2. Neck CTA demonstrates no stenosis of the major cervical arteries.    MRI BRAIN 5/8/2021  1. Small area of right parietal acute infarct.  2. Constellation of findings could suggest normal pressure  hydrocephalus in the appropriate clinical setting, although this may  just represent asymmetric cerebral volume loss.  3. Punctate foci of susceptibility artifact at the central midbrain  and bryson, likely related with chronic microhemorrhage.     PERTINENT LABS  Following labs were reviewed:  Admission on 05/08/2021, Discharged on 05/08/2021   Component Date Value     WBC 05/08/2021 9.4      RBC Count 05/08/2021 4.22      Hemoglobin 05/08/2021 12.5      Hematocrit 05/08/2021 38.3      MCV 05/08/2021 91      MCH 05/08/2021 29.6      MCHC 05/08/2021 32.6      RDW 05/08/2021 13.2      Platelet Count 05/08/2021 322      Diff Method 05/08/2021 Automated Method      % Neutrophils 05/08/2021 58.7      % Lymphocytes 05/08/2021 31.0      % Monocytes 05/08/2021 5.9      % Eosinophils 05/08/2021 3.5      % Basophils 05/08/2021 0.7      % Immature Granulocytes 05/08/2021 0.2      Nucleated RBCs 05/08/2021 0      Absolute Neutrophil 05/08/2021 5.5      Absolute Lymphocytes 05/08/2021 2.9      Absolute Monocytes 05/08/2021 0.6      Absolute Eosinophils 05/08/2021 0.3      Absolute Basophils 05/08/2021 0.1      Abs Immature Granulocytes 05/08/2021 0.0      Absolute Nucleated RBC 05/08/2021 0.0      Sodium 05/08/2021 139      Potassium 05/08/2021 3.5      Chloride 05/08/2021 104      Carbon Dioxide 05/08/2021 30      Anion Gap 05/08/2021 5       Glucose 05/08/2021 136*     Urea Nitrogen 05/08/2021 19      Creatinine 05/08/2021 0.76      GFR Estimate 05/08/2021 73      GFR Estimate If Black 05/08/2021 85      Calcium 05/08/2021 9.4      INR 05/08/2021 0.96      PTT 05/08/2021 27      Troponin I ES 05/08/2021 <0.015      Interpretation ECG 05/08/2021 Click View Image link to view waveform and result      Color Urine 05/08/2021 Yellow      Appearance Urine 05/08/2021 Clear      Glucose Urine 05/08/2021 Negative      Bilirubin Urine 05/08/2021 Negative      Ketones Urine 05/08/2021 Negative      Specific Gravity Urine 05/08/2021 1.014      Blood Urine 05/08/2021 Negative      pH Urine 05/08/2021 6.0      Protein Albumin Urine 05/08/2021 Negative      Urobilinogen mg/dL 05/08/2021 Normal      Nitrite Urine 05/08/2021 Negative      Leukocyte Esterase Urine 05/08/2021 Negative      Source 05/08/2021 Clean catch urine      WBC Urine 05/08/2021 1      RBC Urine 05/08/2021 1      Squamous Epithelial /HPF* 05/08/2021 2*     Mucous Urine 05/08/2021 Present*     SARS-CoV-2 Virus Specime* 05/08/2021 Nasopharyngeal      SARS-CoV-2 PCR Result 05/08/2021 NEGATIVE      SARS-CoV-2 PCR Comment 05/08/2021                      Value:Testing was performed using the Xpert Xpress SARS-CoV-2 Assay on the Cepheid Gene-Xpert   Instrument Systems. Additional information about this Emergency Use Authorization (EUA)   assay can be found via the Lab Guide.     Ancillary Procedure on 05/08/2021   Component Date Value     Radiologist flags 05/08/2021 Small area of right parietal acute infarct .*   Orders Only on 04/30/2021   Component Date Value     Vitamin E 04/30/2021 16.8      Vitamin E Gamma 04/30/2021 1.6      Folate 04/30/2021 39.7      Homocysteine umol/L 04/30/2021 13.4*     Vitamin B1 Whole Blood L* 04/30/2021 151      Methylmalonic Acid 04/30/2021 0.19          Total time spent for face to face visit, reviewing labs/imaging studies, counseling and coordination of care was: 45  Minutes spent on the date of the encounter doing chart review, review of outside records, review of test results, interpretation of tests, patient visit, documentation and discussion with family       This note was dictated using voice recognition software.  Any grammatical or context distortions are unintentional and inherent to the software.    Orders Placed This Encounter   Procedures     IR Carotid Cerebral Angiogram Bilateral     Antinuclear Ab [place] ([employer])     ANCA Screen ([employer])     Rheumatoid factor     Lipid Profile ([employer])     Echocardiogram Complete      New Prescriptions    DONEPEZIL (ARICEPT) 5 MG TABLET    Take 1 tablet (5 mg) by mouth At Bedtime     Modified Medications    No medications on file

## 2021-05-17 NOTE — LETTER
2021         RE: Ariana Barnes  [address]  [facility] [address]        Dear Colleague,    Thank you for referring your patient, Ariana Barnes, to the [facility] [place]. Please see a copy of my visit note below.    NEUROLOGY FOLLOW UP VISIT  NOTE       [facility] [place]  [address] [place] [address]  Tel: [phone]  Fax: [phone]  [email or web address]     Ariana Barnes,  1940, MRN [id number]  PCP: [facility]  Date: 2021      ASSESSMENT & PLAN     Diagnosis code  1. Alzheimer's type dementia with late onset without behavioral disturbance (H)  2. Right parietal infarction     Late onset Alzheimer's dementia  81-year-old Somalian female with history of type 2 diabetes, HTN, HLD who was initially evaluated on 2021 with progressive cognitive decline.  I suspected she has late onset Alzheimer's dementia and that she had lab work for common causes of cognitive decline that was unremarkable.  MRI showed right parietal infarct and she was seen in the emergency room and had a CT of the head and CTA that raise the possibility of CNS vasculitis.  She was discharged and asked to follow-up with neurology.  I have recommended starting Aricept 5 mg daily and after 1 month I will increase the dose to 10 mg daily.  Follow-up will be in 1 month    Right parietal infarction; R/O CNS vasculitis  81-year-old female with history of HTN, HLD, type 2 diabetes who had a MRI of the brain recently as part of work-up for cognitive decline and it showed right parietal acute infarct.  Patient had no symptoms and was briefly seen in the emergency room that day.  CT of the head and CTA was done that showed severe stenosis within distal branches of bilateral middle, anterior and left posterior cerebral arteries and question of CNS vasculitis was raised.  Neck arteries did not show any hemodynamically  significant stenosis.  I have recommended:    1.  Cerebral angiogram to rule out CNS vasculitis  2.  Check antinuclear antibody, ANCA, rheumatoid factor and lipid profile  3.  Continue aspirin and Lipitor with goal of LDL less than 70  4.  Echocardiogram  5.  Follow-up in 1 month    Thank you again for this referral, please feel free to contact me if you have any questions.    Charly Taylor MD  [facility]  (Formerly, [facility]     HISTORY OF PRESENT ILLNESS     Patient is a 81-year-old female with history of type 2 diabetes, HTN, HLD who was initially evaluated in our clinic on 4/8/2021 with complaint of progressive cognitive decline that got worse in the last 6 months.  Family noticed that after she got Covid vaccine her cognitive decline accelerated.  Patient was unable to do many activities of daily living and required full assistance with dressing, grooming, cooking and finances.  At times she is incontinent of urine.  Most of her symptoms are for short-term memory her long-term memory at time is relatively well.  Past medical history significant for psychiatric admission in 1996 and 1997 when she was put on antipsychotic but daughter is not sure if any diagnosis was made.  I suspected late onset Alzheimer's dementia and MRI of the brain was ordered that surprisingly showed a right parietal infarct.  She was subsequently seen in the emergency room and had CT of the brain that showed multiple severe stenosis within the distal branches of the bilateral middle, anterior and left posterior cerebral arteries.  Question of CNS vasculitis was raised.  No hemodynamically significant stenosis was noted in the carotids.  She was discharged and asked to follow-up with neurology.  Part of work-up for her cognitive decline also included normal lab work that included normal folate, vitamin B1, methylmalonic acid level, alpha-tocopherol.  MRI of the brain raised the  possibility of normal pressure encephalitis.  Since her last visit she reports no significant change in her strength.  According to daughter patient continues to struggle with short-term memory and tends to forget things easily.  She does not have significant difficulty with balance but does have bladder incontinence.  No history of any behavioral disturbances     PROBLEM LIST   Patient Active Problem List   Diagnosis Code     Essential hypertension I10     DM (diabetes mellitus), type 2 with peripheral vascular complications (H) E11.51     Hyperlipidemia E78.5     Depression F32.9     Compression fracture of L1 lumbar vertebra (H) S32.010A     Coronary artery disease involving native coronary artery with angina pectoris (H) I25.119     IBS (irritable bowel syndrome) K58.9     Right parietal infarction I63.9     Osteoarthrosis M19.90     Alzheimer's type dementia with late onset without behavioral disturbance (H) G30.1, F02.80         PAST MEDICAL & SURGICAL HISTORY     Past Medical History:   Patient  has a past medical history of Abdominal pain, chronic, right lower quadrant (12/22/2014), Dementia (H), Depressive disorder, Diabetes mellitus (H), Hypertension, Influenza (12/24/2014), and Syncope (3/5/2020).    Surgical History:  She  has no past surgical history on file.     SOCIAL HISTORY     Reviewed, and she  reports that she has never smoked. She has never used smokeless tobacco. She reports that she does not drink alcohol or use drugs.     FAMILY HISTORY     Reviewed, and family history includes Unknown/Adopted in her father and mother.     ALLERGIES     Allergies   Allergen Reactions     Dicyclomine Unknown and Dizziness     Started at [facility] 10/17; d/c'd shortly after due to SEs     Hydrochlorothiazide Unknown     Stopped taking due to abdominal pain     Isosorbide Other (See Comments)     Severe leg pain  Leg pain  Severe leg pain           REVIEW OF SYSTEMS     A 12 point review of system was performed  and was negative except as outlined in the history of present illness.     HOME MEDICATIONS     Current Outpatient Rx   Medication Sig Dispense Refill     acetaminophen (TYLENOL) 325 MG tablet Take 325-650 mg by mouth every 6 hours as needed        amLODIPine (NORVASC) 10 MG tablet        aspirin 81 MG tablet Take 81 mg by mouth daily        atorvastatin (LIPITOR) 40 MG tablet Take 80 mg by mouth daily        CALCIUM PO Take 1 tablet by mouth daily        carvedilol (COREG) 25 MG tablet        CEQUA 0.09 % SOLN        donepezil (ARICEPT) 5 MG tablet Take 1 tablet (5 mg) by mouth At Bedtime 30 tablet 1     GLIPIZIDE PO Dose unknown       losartan (COZAAR) 100 MG tablet Take 100 mg by mouth daily       multivitamin, therapeutic with minerals (THERA-VIT-M) TABS Take 1 tablet by mouth daily       OYSTER SHELL CALCIUM/D 500-200 MG-UNIT tablet        OZEMPIC, 0.25 OR 0.5 MG/DOSE, 2 MG/1.5ML SOPN        vitamin D3 (CHOLECALCIFEROL) 2000 units (50 mcg) tablet Take 2,000 Units by mouth daily        ziprasidone (GEODON) 40 MG capsule            PHYSICAL EXAM     Vital signs  /63   Pulse 93   Ht 1.524 m (5')   Wt 63.7 kg (140 lb 6.4 oz)   BMI 27.42 kg/m      Weight:   140 lbs 6.4 oz    Patient is alert and oriented to person, but not place, time. In no acute distress. Vital signs were reviewed and are documented in electronic medical record. Neck was supple, no carotid bruits, JVD, or lymphadenopathy was noted.   NEUROLOGY EXAM:    Patient s speech was normal with no aphasia or dysarthria.     Short-term memory impaired with inability to remember 3 words on mini-cog exam. Unable to draw clock. Named foods she ate for breakfast, but daughter notes these were incorrect.     Cranial nerves II -XII were intact other than difficulty understanding testing for CN XI    Patient had normal mass, tone and motor strength was 5/5 in all extremities without pronator drift.      Sensation was intact to light touch intact.     Reflexes were 1+ symmetrical with downgoing toes.     Noted to be rubbing at thumbs bilaterally using index finger     Difficulty following commands for FNF testing.     Gait testing was abnormal with very slow gait. Initially stood up and was unable to walk forward.      DIAGNOSTIC STUDIES     PERTINENT RADIOLOGY  Following imaging studies were reviewed:     CT, CTA BRAIN 5/8/2021  1. Head CTA demonstrates multiple severe stenosis within the distal  branches of bilateral middle, bilateral anterior, and left posterior  cerebral arteries, as described above. Findings are suggestive of  vasculitis. No aneurysm of the major intracranial arteries.   2. Neck CTA demonstrates no stenosis of the major cervical arteries.    MRI BRAIN 5/8/2021  1. Small area of right parietal acute infarct.  2. Constellation of findings could suggest normal pressure  hydrocephalus in the appropriate clinical setting, although this may  just represent asymmetric cerebral volume loss.  3. Punctate foci of susceptibility artifact at the central midbrain  and bryson, likely related with chronic microhemorrhage.     PERTINENT LABS  Following labs were reviewed:  Admission on 05/08/2021, Discharged on 05/08/2021   Component Date Value     WBC 05/08/2021 9.4      RBC Count 05/08/2021 4.22      Hemoglobin 05/08/2021 12.5      Hematocrit 05/08/2021 38.3      MCV 05/08/2021 91      MCH 05/08/2021 29.6      MCHC 05/08/2021 32.6      RDW 05/08/2021 13.2      Platelet Count 05/08/2021 322      Diff Method 05/08/2021 Automated Method      % Neutrophils 05/08/2021 58.7      % Lymphocytes 05/08/2021 31.0      % Monocytes 05/08/2021 5.9      % Eosinophils 05/08/2021 3.5      % Basophils 05/08/2021 0.7      % Immature Granulocytes 05/08/2021 0.2      Nucleated RBCs 05/08/2021 0      Absolute Neutrophil 05/08/2021 5.5      Absolute Lymphocytes 05/08/2021 2.9      Absolute Monocytes 05/08/2021 0.6      Absolute Eosinophils 05/08/2021 0.3      Absolute Basophils  05/08/2021 0.1      Abs Immature Granulocytes 05/08/2021 0.0      Absolute Nucleated RBC 05/08/2021 0.0      Sodium 05/08/2021 139      Potassium 05/08/2021 3.5      Chloride 05/08/2021 104      Carbon Dioxide 05/08/2021 30      Anion Gap 05/08/2021 5      Glucose 05/08/2021 136*     Urea Nitrogen 05/08/2021 19      Creatinine 05/08/2021 0.76      GFR Estimate 05/08/2021 73      GFR Estimate If Black 05/08/2021 85      Calcium 05/08/2021 9.4      INR 05/08/2021 0.96      PTT 05/08/2021 27      Troponin I ES 05/08/2021 <0.015      Interpretation ECG 05/08/2021 Click View Image link to view waveform and result      Color Urine 05/08/2021 Yellow      Appearance Urine 05/08/2021 Clear      Glucose Urine 05/08/2021 Negative      Bilirubin Urine 05/08/2021 Negative      Ketones Urine 05/08/2021 Negative      Specific Gravity Urine 05/08/2021 1.014      Blood Urine 05/08/2021 Negative      pH Urine 05/08/2021 6.0      Protein Albumin Urine 05/08/2021 Negative      Urobilinogen mg/dL 05/08/2021 Normal      Nitrite Urine 05/08/2021 Negative      Leukocyte Esterase Urine 05/08/2021 Negative      Source 05/08/2021 Clean catch urine      WBC Urine 05/08/2021 1      RBC Urine 05/08/2021 1      Squamous Epithelial /HPF* 05/08/2021 2*     Mucous Urine 05/08/2021 Present*     SARS-CoV-2 Virus Specime* 05/08/2021 Nasopharyngeal      SARS-CoV-2 PCR Result 05/08/2021 NEGATIVE      SARS-CoV-2 PCR Comment 05/08/2021                      Value:Testing was performed using the Xpert Xpress SARS-CoV-2 Assay on the Cepheid Gene-Xpert   Instrument Systems. Additional information about this Emergency Use Authorization (EUA)   assay can be found via the Lab Guide.     Ancillary Procedure on 05/08/2021   Component Date Value     Radiologist flags 05/08/2021 Small area of right parietal acute infarct .*   Orders Only on 04/30/2021   Component Date Value     Vitamin E 04/30/2021 16.8      Vitamin E Gamma 04/30/2021 1.6      Folate 04/30/2021 39.7       Homocysteine umol/L 04/30/2021 13.4*     Vitamin B1 Whole Blood L* 04/30/2021 151      Methylmalonic Acid 04/30/2021 0.19          Total time spent for face to face visit, reviewing labs/imaging studies, counseling and coordination of care was: 45 Minutes spent on the date of the encounter doing chart review, review of outside records, review of test results, interpretation of tests, patient visit, documentation and discussion with family       This note was dictated using voice recognition software.  Any grammatical or context distortions are unintentional and inherent to the software.    Orders Placed This Encounter   Procedures     IR Carotid Cerebral Angiogram Bilateral     Antinuclear Ab [place] ([employer])     ANCA Screen ([employer])     Rheumatoid factor     Lipid Profile ([employer])     Echocardiogram Complete      New Prescriptions    DONEPEZIL (ARICEPT) 5 MG TABLET    Take 1 tablet (5 mg) by mouth At Bedtime     Modified Medications    No medications on file                     Again, thank you for allowing me to participate in the care of your patient.        Sincerely,        Charly Taylor MD

## 2021-05-17 NOTE — NURSING NOTE
Chief Complaint   Patient presents with     Memory Loss     Follow up     Kristy Cruz RN on 5/17/2021 at 10:41 AM

## 2021-06-10 ENCOUNTER — APPOINTMENT (OUTPATIENT)
Dept: CT IMAGING | Facility: CLINIC | Age: 81
DRG: 066 | End: 2021-06-10
Attending: EMERGENCY MEDICINE
Payer: COMMERCIAL

## 2021-06-10 ENCOUNTER — APPOINTMENT (OUTPATIENT)
Dept: CT IMAGING | Facility: CLINIC | Age: 81
DRG: 066 | End: 2021-06-10
Attending: STUDENT IN AN ORGANIZED HEALTH CARE EDUCATION/TRAINING PROGRAM
Payer: COMMERCIAL

## 2021-06-10 ENCOUNTER — HOSPITAL ENCOUNTER (INPATIENT)
Facility: CLINIC | Age: 81
LOS: 1 days | Discharge: HOME | DRG: 066 | End: 2021-06-11
Attending: EMERGENCY MEDICINE | Admitting: PSYCHIATRY & NEUROLOGY
Payer: COMMERCIAL

## 2021-06-10 DIAGNOSIS — E78.2 MIXED HYPERLIPIDEMIA: ICD-10-CM

## 2021-06-10 DIAGNOSIS — I67.2 INTRACRANIAL ATHEROSCLEROSIS: Primary | ICD-10-CM

## 2021-06-10 DIAGNOSIS — I63.312 CEREBROVASCULAR ACCIDENT (CVA) DUE TO THROMBOSIS OF LEFT MIDDLE CEREBRAL ARTERY (H): ICD-10-CM

## 2021-06-10 DIAGNOSIS — Z11.52 ENCOUNTER FOR SCREENING LABORATORY TESTING FOR SEVERE ACUTE RESPIRATORY SYNDROME CORONAVIRUS 2 (SARS-COV-2): ICD-10-CM

## 2021-06-10 DIAGNOSIS — I10 ESSENTIAL HYPERTENSION, MALIGNANT: ICD-10-CM

## 2021-06-10 DIAGNOSIS — R26.89 SCISSOR GAIT: ICD-10-CM

## 2021-06-10 LAB
ANION GAP SERPL CALCULATED.3IONS-SCNC: 7 MMOL/L (ref 3–14)
APTT PPP: 25 SEC (ref 22–37)
B-HCG FREE SERPL-ACNC: 1 [IU]/L (ref 0.86–1.14)
BASOPHILS # BLD AUTO: 0.1 10E9/L (ref 0–0.2)
BASOPHILS NFR BLD AUTO: 0.4 %
BUN SERPL-MCNC: 22 MG/DL (ref 7–30)
CALCIUM SERPL-MCNC: 9.7 MG/DL (ref 8.5–10.1)
CHLORIDE SERPL-SCNC: 103 MMOL/L (ref 94–109)
CO2 SERPL-SCNC: 29 MMOL/L (ref 20–32)
CREAT BLD-MCNC: 1.5 MG/DL (ref 0.52–1.04)
CREAT SERPL-MCNC: 1.32 MG/DL (ref 0.52–1.04)
DIFFERENTIAL METHOD BLD: ABNORMAL
EOSINOPHIL # BLD AUTO: 0.3 10E9/L (ref 0–0.7)
EOSINOPHIL NFR BLD AUTO: 1.8 %
ERYTHROCYTE [DISTWIDTH] IN BLOOD BY AUTOMATED COUNT: 13.2 % (ref 10–15)
GFR SERPL CREATININE-BSD FRML MDRD: 33 ML/MIN/{1.73_M2}
GFR SERPL CREATININE-BSD FRML MDRD: 38 ML/MIN/{1.73_M2}
GLUCOSE BLDC GLUCOMTR-MCNC: 128 MG/DL (ref 70–99)
GLUCOSE SERPL-MCNC: 114 MG/DL (ref 70–99)
HCT VFR BLD AUTO: 39.2 % (ref 35–47)
HGB BLD-MCNC: 12.4 G/DL (ref 11.7–15.7)
IMM GRANULOCYTES # BLD: 0 10E9/L (ref 0–0.4)
IMM GRANULOCYTES NFR BLD: 0.3 %
INR PPP: 0.96 (ref 0.86–1.14)
LABORATORY COMMENT REPORT: NORMAL
LYMPHOCYTES # BLD AUTO: 3.2 10E9/L (ref 0.8–5.3)
LYMPHOCYTES NFR BLD AUTO: 22.2 %
MCH RBC QN AUTO: 28.7 PG (ref 26.5–33)
MCHC RBC AUTO-ENTMCNC: 31.6 G/DL (ref 31.5–36.5)
MCV RBC AUTO: 91 FL (ref 78–100)
MONOCYTES # BLD AUTO: 0.8 10E9/L (ref 0–1.3)
MONOCYTES NFR BLD AUTO: 5.3 %
NEUTROPHILS # BLD AUTO: 10.1 10E9/L (ref 1.6–8.3)
NEUTROPHILS NFR BLD AUTO: 70 %
NRBC # BLD AUTO: 0 10*3/UL
NRBC BLD AUTO-RTO: 0 /100
PLATELET # BLD AUTO: 337 10E9/L (ref 150–450)
POTASSIUM SERPL-SCNC: 4 MMOL/L (ref 3.4–5.3)
RADIOLOGIST FLAGS: ABNORMAL
RBC # BLD AUTO: 4.32 10E12/L (ref 3.8–5.2)
SARS-COV-2 RNA RESP QL NAA+PROBE: NEGATIVE
SODIUM SERPL-SCNC: 138 MMOL/L (ref 133–144)
SPECIMEN SOURCE: NORMAL
TROPONIN I SERPL-MCNC: <0.015 UG/L (ref 0–0.04)
WBC # BLD AUTO: 14.4 10E9/L (ref 4–11)

## 2021-06-10 PROCEDURE — 250N000013 HC RX MED GY IP 250 OP 250 PS 637: Performed by: EMERGENCY MEDICINE

## 2021-06-10 PROCEDURE — 96360 HYDRATION IV INFUSION INIT: CPT | Mod: 59 | Performed by: EMERGENCY MEDICINE

## 2021-06-10 PROCEDURE — 99291 CRITICAL CARE FIRST HOUR: CPT | Mod: 25 | Performed by: EMERGENCY MEDICINE

## 2021-06-10 PROCEDURE — 120N000001 HC R&B MED SURG/OB

## 2021-06-10 PROCEDURE — 70450 CT HEAD/BRAIN W/O DYE: CPT

## 2021-06-10 PROCEDURE — 96361 HYDRATE IV INFUSION ADD-ON: CPT | Performed by: EMERGENCY MEDICINE

## 2021-06-10 PROCEDURE — 93010 ELECTROCARDIOGRAM REPORT: CPT | Performed by: EMERGENCY MEDICINE

## 2021-06-10 PROCEDURE — 85025 COMPLETE CBC W/AUTO DIFF WBC: CPT | Performed by: EMERGENCY MEDICINE

## 2021-06-10 PROCEDURE — 80061 LIPID PANEL: CPT | Performed by: EMERGENCY MEDICINE

## 2021-06-10 PROCEDURE — 80048 BASIC METABOLIC PNL TOTAL CA: CPT | Performed by: EMERGENCY MEDICINE

## 2021-06-10 PROCEDURE — 85730 THROMBOPLASTIN TIME PARTIAL: CPT | Performed by: EMERGENCY MEDICINE

## 2021-06-10 PROCEDURE — 84484 ASSAY OF TROPONIN QUANT: CPT | Performed by: EMERGENCY MEDICINE

## 2021-06-10 PROCEDURE — 99207 CT HEAD W/O CONTRAST: CPT | Mod: 26 | Performed by: RADIOLOGY

## 2021-06-10 PROCEDURE — 999N001017 HC STATISTIC GLUCOSE BY METER IP

## 2021-06-10 PROCEDURE — 85610 PROTHROMBIN TIME: CPT | Performed by: EMERGENCY MEDICINE

## 2021-06-10 PROCEDURE — 70498 CT ANGIOGRAPHY NECK: CPT | Mod: 26 | Performed by: RADIOLOGY

## 2021-06-10 PROCEDURE — 0042T CT HEAD PERFUSION WITH CONTRAST: CPT

## 2021-06-10 PROCEDURE — U0003 INFECTIOUS AGENT DETECTION BY NUCLEIC ACID (DNA OR RNA); SEVERE ACUTE RESPIRATORY SYNDROME CORONAVIRUS 2 (SARS-COV-2) (CORONAVIRUS DISEASE [COVID-19]), AMPLIFIED PROBE TECHNIQUE, MAKING USE OF HIGH THROUGHPUT TECHNOLOGIES AS DESCRIBED BY CMS-2020-01-R: HCPCS | Performed by: EMERGENCY MEDICINE

## 2021-06-10 PROCEDURE — 84443 ASSAY THYROID STIM HORMONE: CPT | Performed by: EMERGENCY MEDICINE

## 2021-06-10 PROCEDURE — 99285 EMERGENCY DEPT VISIT HI MDM: CPT | Mod: 25 | Performed by: EMERGENCY MEDICINE

## 2021-06-10 PROCEDURE — 0042T CT HEAD PERFUSION WITH CONTRAST: CPT | Mod: 26 | Performed by: RADIOLOGY

## 2021-06-10 PROCEDURE — 258N000003 HC RX IP 258 OP 636: Performed by: EMERGENCY MEDICINE

## 2021-06-10 PROCEDURE — 250N000011 HC RX IP 250 OP 636: Performed by: STUDENT IN AN ORGANIZED HEALTH CARE EDUCATION/TRAINING PROGRAM

## 2021-06-10 PROCEDURE — 83036 HEMOGLOBIN GLYCOSYLATED A1C: CPT | Performed by: EMERGENCY MEDICINE

## 2021-06-10 PROCEDURE — 70496 CT ANGIOGRAPHY HEAD: CPT

## 2021-06-10 PROCEDURE — 70496 CT ANGIOGRAPHY HEAD: CPT | Mod: 26 | Performed by: RADIOLOGY

## 2021-06-10 PROCEDURE — U0005 INFEC AGEN DETEC AMPLI PROBE: HCPCS | Performed by: EMERGENCY MEDICINE

## 2021-06-10 PROCEDURE — 85610 PROTHROMBIN TIME: CPT

## 2021-06-10 PROCEDURE — 93005 ELECTROCARDIOGRAM TRACING: CPT | Performed by: EMERGENCY MEDICINE

## 2021-06-10 PROCEDURE — 82565 ASSAY OF CREATININE: CPT

## 2021-06-10 PROCEDURE — 250N000011 HC RX IP 250 OP 636: Performed by: EMERGENCY MEDICINE

## 2021-06-10 RX ORDER — IOPAMIDOL 755 MG/ML
75 INJECTION, SOLUTION INTRAVASCULAR ONCE
Status: COMPLETED | OUTPATIENT
Start: 2021-06-10 | End: 2021-06-10

## 2021-06-10 RX ORDER — CLOPIDOGREL BISULFATE 75 MG/1
300 TABLET ORAL ONCE
Status: COMPLETED | OUTPATIENT
Start: 2021-06-10 | End: 2021-06-10

## 2021-06-10 RX ORDER — IOPAMIDOL 755 MG/ML
40 INJECTION, SOLUTION INTRAVASCULAR ONCE
Status: COMPLETED | OUTPATIENT
Start: 2021-06-10 | End: 2021-06-10

## 2021-06-10 RX ADMIN — SODIUM CHLORIDE 1000 ML: 9 INJECTION, SOLUTION INTRAVENOUS at 21:25

## 2021-06-10 RX ADMIN — IOPAMIDOL 75 ML: 755 INJECTION, SOLUTION INTRAVENOUS at 20:46

## 2021-06-10 RX ADMIN — IOPAMIDOL 40 ML: 755 INJECTION, SOLUTION INTRAVENOUS at 20:51

## 2021-06-10 RX ADMIN — CLOPIDOGREL BISULFATE 300 MG: 75 TABLET ORAL at 21:41

## 2021-06-10 ASSESSMENT — VISUAL ACUITY
OU: OTHER (SEE COMMENT)

## 2021-06-10 ASSESSMENT — MIFFLIN-ST. JEOR: SCORE: 1014.73

## 2021-06-11 ENCOUNTER — APPOINTMENT (OUTPATIENT)
Dept: SPEECH THERAPY | Facility: CLINIC | Age: 81
DRG: 066 | End: 2021-06-11
Attending: STUDENT IN AN ORGANIZED HEALTH CARE EDUCATION/TRAINING PROGRAM
Payer: COMMERCIAL

## 2021-06-11 ENCOUNTER — APPOINTMENT (OUTPATIENT)
Dept: PHYSICAL THERAPY | Facility: CLINIC | Age: 81
DRG: 066 | End: 2021-06-11
Attending: STUDENT IN AN ORGANIZED HEALTH CARE EDUCATION/TRAINING PROGRAM
Payer: COMMERCIAL

## 2021-06-11 ENCOUNTER — APPOINTMENT (OUTPATIENT)
Dept: MRI IMAGING | Facility: CLINIC | Age: 81
DRG: 066 | End: 2021-06-11
Attending: STUDENT IN AN ORGANIZED HEALTH CARE EDUCATION/TRAINING PROGRAM
Payer: COMMERCIAL

## 2021-06-11 ENCOUNTER — APPOINTMENT (OUTPATIENT)
Dept: CARDIOLOGY | Facility: CLINIC | Age: 81
DRG: 066 | End: 2021-06-11
Attending: STUDENT IN AN ORGANIZED HEALTH CARE EDUCATION/TRAINING PROGRAM
Payer: COMMERCIAL

## 2021-06-11 VITALS
HEIGHT: 60 IN | OXYGEN SATURATION: 96 % | SYSTOLIC BLOOD PRESSURE: 129 MMHG | WEIGHT: 138.5 LBS | TEMPERATURE: 99 F | RESPIRATION RATE: 12 BRPM | BODY MASS INDEX: 27.19 KG/M2 | HEART RATE: 89 BPM | DIASTOLIC BLOOD PRESSURE: 76 MMHG

## 2021-06-11 LAB
ANION GAP SERPL CALCULATED.3IONS-SCNC: 8 MMOL/L (ref 3–14)
BUN SERPL-MCNC: 16 MG/DL (ref 7–30)
CALCIUM SERPL-MCNC: 9 MG/DL (ref 8.5–10.1)
CHLORIDE SERPL-SCNC: 107 MMOL/L (ref 94–109)
CHOLEST SERPL-MCNC: 164 MG/DL
CO2 SERPL-SCNC: 26 MMOL/L (ref 20–32)
CREAT SERPL-MCNC: 0.98 MG/DL (ref 0.52–1.04)
ERYTHROCYTE [DISTWIDTH] IN BLOOD BY AUTOMATED COUNT: 13.2 % (ref 10–15)
GFR SERPL CREATININE-BSD FRML MDRD: 54 ML/MIN/{1.73_M2}
GLUCOSE BLDC GLUCOMTR-MCNC: 102 MG/DL (ref 70–99)
GLUCOSE BLDC GLUCOMTR-MCNC: 96 MG/DL (ref 70–99)
GLUCOSE SERPL-MCNC: 86 MG/DL (ref 70–99)
HBA1C MFR BLD: 6.5 % (ref 0–5.6)
HCT VFR BLD AUTO: 38.1 % (ref 35–47)
HDLC SERPL-MCNC: 40 MG/DL
HGB BLD-MCNC: 12.3 G/DL (ref 11.7–15.7)
INTERPRETATION ECG - MUSE: NORMAL
LDLC SERPL CALC-MCNC: 88 MG/DL
MCH RBC QN AUTO: 29.2 PG (ref 26.5–33)
MCHC RBC AUTO-ENTMCNC: 32.3 G/DL (ref 31.5–36.5)
MCV RBC AUTO: 91 FL (ref 78–100)
NONHDLC SERPL-MCNC: 123 MG/DL
PLATELET # BLD AUTO: 303 10E9/L (ref 150–450)
POTASSIUM SERPL-SCNC: 3.4 MMOL/L (ref 3.4–5.3)
RADIOLOGIST FLAGS: ABNORMAL
RBC # BLD AUTO: 4.21 10E12/L (ref 3.8–5.2)
SODIUM SERPL-SCNC: 140 MMOL/L (ref 133–144)
TRIGL SERPL-MCNC: 180 MG/DL
TSH SERPL DL<=0.005 MIU/L-ACNC: 0.72 MU/L (ref 0.4–4)
WBC # BLD AUTO: 11.5 10E9/L (ref 4–11)

## 2021-06-11 PROCEDURE — 93270 REMOTE 30 DAY ECG REV/REPORT: CPT

## 2021-06-11 PROCEDURE — 97161 PT EVAL LOW COMPLEX 20 MIN: CPT | Mod: GP | Performed by: PHYSICAL THERAPIST

## 2021-06-11 PROCEDURE — 97116 GAIT TRAINING THERAPY: CPT | Mod: GP | Performed by: PHYSICAL THERAPIST

## 2021-06-11 PROCEDURE — 80048 BASIC METABOLIC PNL TOTAL CA: CPT | Performed by: STUDENT IN AN ORGANIZED HEALTH CARE EDUCATION/TRAINING PROGRAM

## 2021-06-11 PROCEDURE — 999N001017 HC STATISTIC GLUCOSE BY METER IP

## 2021-06-11 PROCEDURE — 85027 COMPLETE CBC AUTOMATED: CPT | Performed by: STUDENT IN AN ORGANIZED HEALTH CARE EDUCATION/TRAINING PROGRAM

## 2021-06-11 PROCEDURE — 97530 THERAPEUTIC ACTIVITIES: CPT | Mod: GP | Performed by: PHYSICAL THERAPIST

## 2021-06-11 PROCEDURE — 92526 ORAL FUNCTION THERAPY: CPT | Mod: GN

## 2021-06-11 PROCEDURE — 70551 MRI BRAIN STEM W/O DYE: CPT

## 2021-06-11 PROCEDURE — 93306 TTE W/DOPPLER COMPLETE: CPT | Mod: 26 | Performed by: INTERNAL MEDICINE

## 2021-06-11 PROCEDURE — 36415 COLL VENOUS BLD VENIPUNCTURE: CPT | Performed by: STUDENT IN AN ORGANIZED HEALTH CARE EDUCATION/TRAINING PROGRAM

## 2021-06-11 PROCEDURE — 93306 TTE W/DOPPLER COMPLETE: CPT

## 2021-06-11 PROCEDURE — 92610 EVALUATE SWALLOWING FUNCTION: CPT | Mod: GN

## 2021-06-11 PROCEDURE — 250N000011 HC RX IP 250 OP 636: Performed by: STUDENT IN AN ORGANIZED HEALTH CARE EDUCATION/TRAINING PROGRAM

## 2021-06-11 PROCEDURE — 96372 THER/PROPH/DIAG INJ SC/IM: CPT | Mod: 59 | Performed by: EMERGENCY MEDICINE

## 2021-06-11 PROCEDURE — 99236 HOSP IP/OBS SAME DATE HI 85: CPT | Mod: GC | Performed by: PSYCHIATRY & NEUROLOGY

## 2021-06-11 PROCEDURE — 70551 MRI BRAIN STEM W/O DYE: CPT | Mod: 26 | Performed by: RADIOLOGY

## 2021-06-11 PROCEDURE — 93272 ECG/REVIEW INTERPRET ONLY: CPT | Performed by: INTERNAL MEDICINE

## 2021-06-11 RX ORDER — ATORVASTATIN CALCIUM 40 MG/1
80 TABLET, FILM COATED ORAL DAILY
Status: DISCONTINUED | OUTPATIENT
Start: 2021-06-11 | End: 2021-06-11 | Stop reason: HOSPADM

## 2021-06-11 RX ORDER — NICOTINE POLACRILEX 4 MG
15-30 LOZENGE BUCCAL
Status: DISCONTINUED | OUTPATIENT
Start: 2021-06-11 | End: 2021-06-11 | Stop reason: HOSPADM

## 2021-06-11 RX ORDER — LIDOCAINE 40 MG/G
CREAM TOPICAL
Status: DISCONTINUED | OUTPATIENT
Start: 2021-06-11 | End: 2021-06-11 | Stop reason: HOSPADM

## 2021-06-11 RX ORDER — DEXTROSE MONOHYDRATE 25 G/50ML
25-50 INJECTION, SOLUTION INTRAVENOUS
Status: DISCONTINUED | OUTPATIENT
Start: 2021-06-11 | End: 2021-06-11 | Stop reason: HOSPADM

## 2021-06-11 RX ORDER — DONEPEZIL HYDROCHLORIDE 5 MG/1
5 TABLET, FILM COATED ORAL AT BEDTIME
Status: DISCONTINUED | OUTPATIENT
Start: 2021-06-11 | End: 2021-06-11 | Stop reason: HOSPADM

## 2021-06-11 RX ORDER — ACETAMINOPHEN 325 MG/1
650 TABLET ORAL EVERY 6 HOURS PRN
Status: DISCONTINUED | OUTPATIENT
Start: 2021-06-11 | End: 2021-06-11 | Stop reason: HOSPADM

## 2021-06-11 RX ORDER — CLOPIDOGREL BISULFATE 75 MG/1
75 TABLET ORAL DAILY
Qty: 90 TABLET | Refills: 0 | Status: SHIPPED | OUTPATIENT
Start: 2021-06-12 | End: 2021-09-10

## 2021-06-11 RX ORDER — HEPARIN SODIUM 5000 [USP'U]/.5ML
5000 INJECTION, SOLUTION INTRAVENOUS; SUBCUTANEOUS EVERY 12 HOURS
Status: DISCONTINUED | OUTPATIENT
Start: 2021-06-11 | End: 2021-06-11 | Stop reason: HOSPADM

## 2021-06-11 RX ORDER — ASPIRIN 81 MG/1
81 TABLET ORAL DAILY
Status: DISCONTINUED | OUTPATIENT
Start: 2021-06-11 | End: 2021-06-11 | Stop reason: HOSPADM

## 2021-06-11 RX ORDER — CLOPIDOGREL BISULFATE 75 MG/1
75 TABLET ORAL DAILY
Status: DISCONTINUED | OUTPATIENT
Start: 2021-06-11 | End: 2021-06-11 | Stop reason: HOSPADM

## 2021-06-11 RX ORDER — EZETIMIBE 10 MG/1
10 TABLET ORAL DAILY
Qty: 30 TABLET | Refills: 3 | Status: SHIPPED | OUTPATIENT
Start: 2021-06-11

## 2021-06-11 RX ADMIN — HEPARIN SODIUM 5000 UNITS: 5000 INJECTION, SOLUTION INTRAVENOUS; SUBCUTANEOUS at 09:03

## 2021-06-11 ASSESSMENT — VISUAL ACUITY: OU: OTHER (SEE COMMENT)

## 2021-06-11 NOTE — ED NOTES
Neuro resident updated on modified care plan for patient who is sleeping and mostly uncooperative with exams and tasks partially because she's sleeping and partially because she has dementia and ESL status. Neuro resident approved modifications.

## 2021-06-11 NOTE — ED NOTES
Patient passed my bedside dysphagia screen at 9PM. OK for pills (please ONLY give aspirin and Plavix tonight). Please hold off on any diet or fluids by mouth or additional scheduled medications until SLP evaluation in AM .     Regan Troncoso MD  PGY-2 Neurology Resident

## 2021-06-11 NOTE — ED PROVIDER NOTES
ED Provider Note  [facility]      History     Chief Complaint   Patient presents with     Slurred Speech     Altered Mental Status     The history is provided by a relative and the patient. The history is limited by the condition of the patient. A  was used (Gretta; Daughter Interpreted).     Ariana Barnes is a 81 year old female with a medical history significant for Alzheimer's dementia, type 2 diabetes mellitus, CAD, hypertension and hyperlipidemia who presents to the Emergency Department for evaluation of of speech difficulty.  The daughter reports that the patient ate lunch and then went to sleep around 3:30 PM.  She reports that the patient was acting normal and at her baseline of health prior to going to sleep.  Patient woke up around 6:30 PM and the family noted that the patient's speech was not making any sense.  They also reported that the patient was having some increased difficulty with walking.  Family brought the patient here to the Emergency Department for evaluation.  They report that the patient's speech difficulty is still ongoing here, but the patient is able to answer yes/no questions.  When asked, the patient denied any headache, chest pain, shortness of breath, abdominal pain or numbness in the arms or legs.    Past Medical History  Past Medical History:   Diagnosis Date     Abdominal pain, chronic, right lower quadrant 12/22/2014     Dementia (H)      Depressive disorder      Diabetes mellitus (H)      Hypertension      Influenza 12/24/2014     Syncope 3/5/2020     History reviewed. No pertinent surgical history.  acetaminophen (TYLENOL) 325 MG tablet  amLODIPine (NORVASC) 10 MG tablet  aspirin 81 MG tablet  atorvastatin (LIPITOR) 40 MG tablet  CALCIUM PO  carvedilol (COREG) 25 MG tablet  CEQUA 0.09 % SOLN  donepezil (ARICEPT) 5 MG tablet  GLIPIZIDE PO  losartan (COZAAR) 100 MG tablet  multivitamin, therapeutic with minerals (THERA-VIT-M)  TABS  OYSTER SHELL CALCIUM/D 500-200 MG-UNIT tablet  OZEMPIC, 0.25 OR 0.5 MG/DOSE, 2 MG/1.5ML SOPN  vitamin D3 (CHOLECALCIFEROL) 2000 units (50 mcg) tablet  ziprasidone (GEODON) 40 MG capsule      Allergies   Allergen Reactions     Dicyclomine Unknown and Dizziness     Started at [facility] 10/17; d/c'd shortly after due to SEs     Hydrochlorothiazide Unknown     Stopped taking due to abdominal pain     Isosorbide Other (See Comments)     Severe leg pain  Leg pain  Severe leg pain       Family History  Family History   Problem Relation Age of Onset     Unknown/Adopted Mother      Unknown/Adopted Father      Social History   Social History     Tobacco Use     Smoking status: Never Smoker     Smokeless tobacco: Never Used   Substance Use Topics     Alcohol use: No     Drug use: No      Past medical history, past surgical history, medications, allergies, family history, and social history were reviewed with the patient. No additional pertinent items.       Review of Systems   Unable to perform ROS: Acuity of condition       Physical Exam   BP: 124/71  Pulse: 86  Temp: 97.9  F (36.6  C)  Resp: 18  SpO2: 98 %  Physical Exam  GEN:  Well developed, no acute distress  HEENT:  PERRL, EOMI, Mucous membranes are moist.  Frequent lip smacking, unchanged from baseline per family.  Cardio:  RRR, no murmur, radial pulses equal bilaterally  PULM:  Lungs clear, good air movement, no wheezes, rales   Abd:  Soft, normal bowel sounds, no focal tenderness  Musculoskeletal:  normal range of motion, no lower extremity swelling or calf tenderness  Neuro:  Alert, not oriented, follows some commands but appears confused at times and is not able to follow all commands, CN exam is normal, strength is normal in all 4 extremities, sensory exam is difficult due to the patient having some confusion.  There is right arm pronator drift   Skin:  Warm, dry    ED Course      Procedures     8:25 PM  The patient was seen and examined by Brigitte Sadler,  MD in Room ED03.                EKG Interpretation:      Interpreted by Brigitte Sadler MD  Time reviewed: 22:10  Symptoms at time of EKG: Speech changes  Rhythm: normal sinus   Rate: normal  Axis: normal  Ectopy: none  Conduction: normal  ST Segments/ T Waves: No ST-T wave changes  Q Waves: none  Comparison to prior: Unchanged from March 5, 2020    Clinical Impression: normal EKG        Critical Care Addendum    My initial assessment, based on my review of nursing observations, review of vital signs, focused history, physical exam, review of cardiac rhythm monitor, 12 lead ECG analysis and discussion with family , established that Ariana Barnes has focal neurologic abnormalities, which requires immediate intervention, and therefore she is critically ill.     After the initial assessment, the care team initiated multiple lab tests, initiated IV fluid administration, initiated medication therapy with Plavix and consulted with stroke team to provide stabilization care. Due to the critical nature of this patient, I reassessed nursing observations, vital signs, interpretation of labs and neurologic status multiple times prior to her disposition.     Time also spent performing documentation, discussion with family to obtain medical information for decision making, reviewing test results and coordination of care.     Critical care time (excluding teaching time and procedures): 60 minutes.   The patient has stroke symptoms:         ED Stroke specific documentation           NIHSS PDF     Patient last known well time: 3:30 pm  ED Provider first to bedside at: probably 8:30  CT Results received at: didn't note time    Thrombolytics:   Not given due to outside time window.    If treating with thrombolytics: Ensure SBP<180 and DBP<105 prior to treatment with thrombolytics.  Administering thrombolytics after treatment with IV labetalol, hydralazine, or nicardipine is reasonable once BP control is  established.    Endovascular Retrieval:  Not initiated due to absence of proximal vessel occlusion    National Institutes of Health Stroke Scale (Baseline)  Time Performed: at arrival     Score    Level of consciousness: (0)   Alert, keenly responsive    LOC questions: (2)   Answers neither question correctly    LOC commands: (2)   Performs neither task correctly    Best gaze: (0)   Normal    Visual: (0)   No visual loss    Facial palsy: (0)   Normal symmetrical movements    Motor arm (left): (1)   Drift    Motor arm (right): (0)   No drift    Motor leg (left): (0)   No drift    Motor leg (right): (0)   No drift    Limb ataxia: (0)   Absent    Sensory: (0)   Normal- no sensory loss    Best language: (3)   Mute- global aphasia    Dysarthria: (0)   Normal    Extinction and inattention: (0)   No abnormality        Total Score:  8        Stroke Mimics were considered (including migraine headache, seizure disorder, hypoglycemia (or hyperglycemia), head or spinal trauma, CNS infection, Toxin ingestion and shock state (e.g. sepsis) .          Results for orders placed or performed during the hospital encounter of 06/10/21   CT Head w/o Contrast     Status: Abnormal   Result Value Ref Range    Radiologist flags Acute infarction in the left parietal lobe (Urgent)     Narrative    CT HEAD W/O CONTRAST 6/10/2021 9:08 PM    History: Stroke code    Comparison: CT identified a 21..    Technique: Using multidetector thin collimation helical acquisition  technique, axial, coronal and sagittal CT images from the skull base  to the vertex were obtained without intravenous contrast.     Findings:    There is loss of gray-white matter differentiation in the left  parietal lobe (series 5 image 23).    Generalized cerebral and cerebellar atrophy with periventricular  leukoaraiosis. No intracranial hemorrhage, mass effect, or midline  shift. Compensatory ventriculomegaly. The basal cisterns are patent.    The visualized paranasal sinuses  are clear. The mastoid air cells are  clear.       Impression    Impression: Loss of gray-white matter differentiation in the left  parietal lobe is concerning for acute infarction.     [Urgent Result: Acute infarction in the left parietal lobe]    Finding was identified on 6/10/2021 8:49 PM.     Dr. Sadler  was contacted by Dr. Seo at 6/10/2021 9:10 PM and  verbalized understanding of the urgent finding.     I have personally reviewed the examination and initial interpretation  and I agree with the findings.    DENISHA CROCKER MD   CTA Head Neck with Contrast     Status: None    Narrative    CTA  HEAD NECK WITH CONTRAST 6/10/2021 9:15 PM    CT angiogram of the neck   CT angiogram of the base of the brain with contrast  Reconstruction by the Radiologist on the 3D workstation    History:  Acute neurological deficit    Comparison:  CTA head and neck 5/8/2021..      Technique:  HEAD and NECK CTA: During rapid bolus intravenous injection of  nonionic contrast material, axial images were obtained using thin  collimation multidetector helical technique from the base of the skull  through the [place]. This CT angiogram data was reconstructed  at thin intervals with mild overlap. Images were sent to the [employer]  workstation, and 3D reconstructions were obtained. The axial source  images, multiplanar reformations, 3D reconstructions in both maximum  intensity projection display and volume rendered models were reviewed,  with reconstructions performed by the technologist and the  radiologist.    Contrast: 75 cc of Isovue-370    Findings:  Head CTA demonstrates a new focal occlusion in the M3 segment of left  MCA (series 504 image 148), which was previously open on 5/8/2021.  Redemonstration of multifocal stenosis in the distal P2 segment of  left PCA, anterior branches of bilateral middle cerebral arteries, as  well as A2 and A3 branches of bilateral anterior cerebral arteries,  these are not significantly changed  in comparison to prior study.    Neck CTA demonstrates no evident stenosis of the major cervical  arteries. The origins of the great vessels from the aortic arch are  patent. The normal distal right internal carotid artery measures 5 mm.  The normal distal left internal carotid artery measures 5 mm.       Impression    Impression:    1. Head CTA demonstrates new focal occlusion in the M3 segment of th  left MCA. Redemonstration of multifocal stenosis in the TIGIST, MCA, and  PCA described as above. These are concerning for atherosclerotic  disease versus vasculitis.  2. Neck CTA demonstrates no stenosis of the major cervical arteries.     I have personally reviewed the examination and initial interpretation  and I agree with the findings.    DENISHA CROCKER MD   CT Head Perfusion w Contrast     Status: None    Narrative    CT HEAD PERFUSION WITH CONTRAST 6/10/2021 9:07 PM    CT of the Head without contrast   CT Brain Perfusion study with contrast    History:  Neuro deficit, acute, stroke suspected     Comparison: Same day CT head     Technique:     CT BRAIN PERFUSION: Dynamic perfusion CT of the brain was performed at  multiple levels with 10 mm slice thickness; perfusion was measured and  imaged during 2 separate rapid bolus intravenous injections of  nonionic iodinated contrast medium, 1) centered at the level of  the  basal ganglia and 2) centered at the level of the top of the lateral  ventricles. Each injection required approximately 40 cc of nonionic  contrast.  Images were reconstructed and reviewed on the [employer] 3D  workstation.     Contrast: iopamidol (ISOVUE-370) solution 40 mL    Findings:   CT Perfusion: Images documenting a region in the left parietal lobe  (which correlate with previously seen loss of the gray-white matter  differentiation) with increased Tmax, decreased cerebral blood flow,  and decreased cerebral blood volume.        Impression    Impression:   Left parietal lobe infarction  demonstrates increased Tmax, decreased  CBF and CBV are concerning for core infarction.     I have personally reviewed the examination and initial interpretation  and I agree with the findings.    DENISHA CROCKER MD   Glucose by meter     Status: Abnormal   Result Value Ref Range    Glucose 128 (H) 70 - 99 mg/dL   CBC with Platelets & Differential     Status: Abnormal   Result Value Ref Range    WBC 14.4 (H) 4.0 - 11.0 10e9/L    RBC Count 4.32 3.8 - 5.2 10e12/L    Hemoglobin 12.4 11.7 - 15.7 g/dL    Hematocrit 39.2 35.0 - 47.0 %    MCV 91 78 - 100 fl    MCH 28.7 26.5 - 33.0 pg    MCHC 31.6 31.5 - 36.5 g/dL    RDW 13.2 10.0 - 15.0 %    Platelet Count 337 150 - 450 10e9/L    Diff Method Automated Method     % Neutrophils 70.0 %    % Lymphocytes 22.2 %    % Monocytes 5.3 %    % Eosinophils 1.8 %    % Basophils 0.4 %    % Immature Granulocytes 0.3 %    Nucleated RBCs 0 0 /100    Absolute Neutrophil 10.1 (H) 1.6 - 8.3 10e9/L    Absolute Lymphocytes 3.2 0.8 - 5.3 10e9/L    Absolute Monocytes 0.8 0.0 - 1.3 10e9/L    Absolute Eosinophils 0.3 0.0 - 0.7 10e9/L    Absolute Basophils 0.1 0.0 - 0.2 10e9/L    Abs Immature Granulocytes 0.0 0 - 0.4 10e9/L    Absolute Nucleated RBC 0.0    Basic metabolic panel     Status: Abnormal   Result Value Ref Range    Sodium 138 133 - 144 mmol/L    Potassium 4.0 3.4 - 5.3 mmol/L    Chloride 103 94 - 109 mmol/L    Carbon Dioxide 29 20 - 32 mmol/L    Anion Gap 7 3 - 14 mmol/L    Glucose 114 (H) 70 - 99 mg/dL    Urea Nitrogen 22 7 - 30 mg/dL    Creatinine 1.32 (H) 0.52 - 1.04 mg/dL    GFR Estimate 38 (L) >60 mL/min/[1.73_m2]    GFR Estimate If Black 44 (L) >60 mL/min/[1.73_m2]    Calcium 9.7 8.5 - 10.1 mg/dL   INR     Status: None   Result Value Ref Range    INR 0.96 0.86 - 1.14   Partial thromboplastin time     Status: None   Result Value Ref Range    PTT 25 22 - 37 sec   Troponin I     Status: None   Result Value Ref Range    Troponin I ES <0.015 0.000 - 0.045 ug/L   Creatinine POCT      Status: Abnormal   Result Value Ref Range    Creatinine 1.5 (H) 0.52 - 1.04 mg/dL    GFR Estimate 33 (L) >60 mL/min/[1.73_m2]    GFR Estimate If Black 40 (L) >60 mL/min/[1.73_m2]   Asymptomatic SARS-CoV-2 COVID-19 Virus (Coronavirus) by PCR     Status: None    Specimen: Nasopharyngeal   Result Value Ref Range    SARS-CoV-2 Virus Specimen Source Nasopharyngeal     SARS-CoV-2 PCR Result NEGATIVE     SARS-CoV-2 PCR Comment       Testing was performed using the Xpert Xpress SARS-CoV-2 Assay on the Cepheid Gene-Xpert   Instrument Systems. Additional information about this Emergency Use Authorization (EUA)   assay can be found via the Lab Guide.     EKG 12-lead, tracing only     Status: None (Preliminary result)   Result Value Ref Range    Interpretation ECG Click View Image link to view waveform and result    ISTAT INR POCT     Status: None   Result Value Ref Range    ISTAT INR 1.0 0.86 - 1.14     Medications   sodium chloride (PF) 0.9% PF flush 90 mL (90 mLs Intravenous Given 6/10/21 2046)   iopamidol (ISOVUE-370) solution 75 mL (75 mLs Intravenous Given 6/10/21 2046)   iopamidol (ISOVUE-370) solution 40 mL (40 mLs Intravenous Given 6/10/21 2051)   clopidogrel (PLAVIX) tablet 300 mg (300 mg Oral Given 6/10/21 2141)   0.9% sodium chloride BOLUS (0 mLs Intravenous Stopped 6/10/21 2345)        Assessments & Plan (with Medical Decision Making)   Patient presents with acute onset speech deficit.  Time of onset is unclear, because the patient had symptoms when she awoke from a nap.  A stroke code was called and stroke team was here to see the patient shortly after her arrival.  There is an acute stroke seen on head CT.  Since it is outside the time window, she was not given thrombolytics.  She is not a candidate for thrombectomy since its not a proximal vessel occlusion.  Patient was given Plavix in the ED, she had already taken aspirin at home today.  She will be admitted to the stroke team.    I have reviewed the nursing  notes. I have reviewed the findings, diagnosis, plan and need for follow up with the patient.    New Prescriptions    No medications on file       Final diagnoses:   Cerebrovascular accident (CVA) due to thrombosis of left middle cerebral artery (H)       --  I, Ari Ramos, am serving as a trained medical scribe to document services personally performed by Brigitte Sadler MD, based on the provider's statements to me.     I, Brigitte Sadler MD, was physically present and have reviewed and verified the accuracy of this note documented by Ari Ramos.    Brigitte Sadler MD  [facility] EMERGENCY DEPARTMENT  6/10/2021     Brigitte Sadler MD  06/11/21 0117

## 2021-06-11 NOTE — DISCHARGE SUMMARY
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  CT showed establishing infarct in the Left parietal lobe due to loss of gray-white differentiation, and CT perfusion showed lack of penumbra. Due to this, TNK was not offered due to lack of salvageable tissue. She is demented as baseline, and continues to have some WF difficulties.     Please see H&P dated 6/10/2021 for further details about presentation.    Brief Hospital Course:   Patient presented with aphasia out of proportion to her demented baseline. Found to have Left parietal acute infarct with no penumbra on CT perfusion. Due to this, IV TNK was not given. Work-up as stated below under Pertinent Investigations. Etiology is thought to be embolic, although origin is unknown.      #Expressive Aphasia secondary to acute left parietal infarct   #Hx of right parietal infarct   Rehab evaluation: PT and SLP. - Home with home  care therapy  Smoking Cessation: patient is not a smoker  BP Long-term Goal: 140/90 or less  Antithrombotic/Anticoagulant Agent: aspirin 81 mg and clopidogrel (Plavix) 75 mg for 3 months, then Plavix alone  Statins: Atorvastatin 40mg, and Ezetimibe 10mg daily     Hgb A1C Goal: < 7.0  30 day event monitor on discharge     Other problems addressed during this hospitalization:  #Alzheimer's Disease  - Resume PTA donepezil      #DMII  - Hold PTA Glipizide  - A1c goal as above     #HTN  - PTA amlodipine, Carvedilol and Losartan     #Hx of HLP  - PTA Lipitor 40 mg daily  - Start Ezetimibe 10mg daily     #Hx of psychiatric illness  Family not sure of diagnosis, reportedly had psychiatric admission in 1990s. Was on Ziprasidone for years but this was stopped a couple months ago.       PERTINENT INVESTIGATIONS  Labs  Lipid Panel:   Recent Labs   Lab Test 06/10/21  2032   CHOL 164   HDL 40*   LDL 88   TRIG 180*     A1C:   Lab Results   Component Value Date    A1C 6.5 06/10/2021     INR:   Recent Labs   Lab 06/10/21  2032   INR 0.96      Coag Panel / Hypercoag Workup: Not indicated  Pending test results: none    Echo:   Interpretation Summary  Technically difficult study.  Global and regional left ventricular function is hyperkinetic with an EF of  65-70%.  Global right ventricular function is normal.  Valves poorly seen. No significant valve disease by Doppler interrogation.  This study was compared with the study from 3/6/2020 .There has been no  change.    Imaging:   CTA:  Impression:    1. Head CTA demonstrates new focal occlusion in the M3 segment of th  left MCA. Redemonstration of multifocal stenosis in the TIGIST, MCA, and  PCA described as above. These are concerning for atherosclerotic  disease versus vasculitis.  2. Neck CTA demonstrates no stenosis of the major cervical arteries.     CTP:  Impression:   Left parietal lobe infarction demonstrates increased Tmax, decreased  CBF and CBV are concerning for core infarction.      MRI:  Impression:   1. Diffusion restriction within the left parietal lobe and left genu  of the corpus callosum is consistent with acute infarction.  2. Prominent ventricular system out of proportion to the cerebral  sulci may be seen with normal pressure hydrocephalus.  3. Confluent periventricular white matter hyperintensities likely  represent chronic small vessel ischemic disease.      Endovascular procedure: None     Cardiac Monitoring: Patient had > 24 hrs of cardiac monitor while in hospital.    Findings: No atrial fibrillation was found.    Sleep Apnea Screen:   Unable to screen ischemic stroke patient due to dementia    Sleep Apnea Screen Findings: not evaulated due to dementia    PHQ-9 Depression Screen Score: Unable to assess due to dementia and aphasia    Education discussed with: family on blood pressure management, cholesterol management, medical management, smoking cessation plan, diet modification, exercise recommendation, follow-up recommendations/plan and 911 call if warning signs of stroke.    During daily rounds, the plan of care was discussed and developed with family.  Plan of care includes: above plan.    PHYSICAL EXAMINATION  Vital Signs:  B/P: 112/68, T: 99, P: 91, R: 12    General:  patient lying in bed without any acute distress, confused  HEENT:  normocephalic/atraumatic, no oral lesions  Cardio:  RRR  Pulmonary:  no respiratory distress  Abdomen:  non-distended  Extremities:  pitting edema present  Skin:  warm/dry   Neurologic   Mental Status:  alert, does not answer orientation questions, unable to comprehend tasks and questions; aphasia of both receptive and expressive components, in the setting of known dementia  Cranial Nerves:  visual fields intact, pupils 4mm and sluggish (baseline), EOMI with normal smooth pursuit with mild Left preference, hearing not formally tested but intact to conversation, remainder of CN unable to assess  Motor:  normal muscle tone and bulk, no  abnormal movements, able to move all limbs spontaneously  Reflexes:  toes down-going  Sensory:  light touch sensation intact and symmetric   Coordination:  GWEN due to aphasia  Station/Gait:  deferred     National Institutes of Health Stroke Scale (on day of discharge)  NIHSS Total Score: 5    Modified Chantelle Scale (on day of discharge): 4-Moderately severe disability; unable to walk without assistance and unable to attend to own bodily    Medications    Current Discharge Medication List      START taking these medications    Details   clopidogrel (PLAVIX) 75 MG tablet 1 tablet (75 mg) by Oral or NG Tube route daily  Qty: 90 tablet, Refills: 0    Associated Diagnoses: Intracranial atherosclerosis      ezetimibe (ZETIA) 10 MG tablet Take 1 tablet (10 mg) by mouth daily  Qty: 30 tablet, Refills: 3    Associated Diagnoses: Intracranial atherosclerosis; Mixed hyperlipidemia         CONTINUE these medications which have NOT CHANGED    Details   acetaminophen (TYLENOL) 325 MG tablet Take 325-650 mg by mouth every 6 hours as needed       amLODIPine (NORVASC) 10 MG tablet       aspirin 81 MG tablet Take 81 mg by mouth daily       atorvastatin (LIPITOR) 40 MG tablet Take 80 mg by mouth daily       CALCIUM PO Take 1 tablet by mouth daily       carvedilol (COREG) 25 MG tablet Take 12.5 mg by mouth 2 times daily (with meals)       CEQUA 0.09 % SOLN       donepezil (ARICEPT) 5 MG tablet Take 1 tablet (5 mg) by mouth At Bedtime  Qty: 30 tablet, Refills: 1    Associated Diagnoses: Alzheimer's type dementia with late onset without behavioral disturbance (H)      GLIPIZIDE PO Take 10 mg by mouth every morning (before breakfast) Dose unknown       losartan (COZAAR) 100 MG tablet Take 100 mg by mouth daily      multivitamin, therapeutic with minerals (THERA-VIT-M) TABS Take 1 tablet by mouth daily      OYSTER SHELL CALCIUM/D 500-200 MG-UNIT tablet       vitamin D3 (CHOLECALCIFEROL) 2000 units (50 mcg) tablet Take 2,000 Units by mouth  daily       OZEMPIC, 0.25 OR 0.5 MG/DOSE, 2 MG/1.5ML SOPN       ziprasidone (GEODON) 40 MG capsule              Additional recommendations and follow up:       Home Care PT Referral for Hospital Discharge      Home Care OT Referral for Hospital Discharge      Home Care SLP Referral for Hospital Discharge      Reason for your hospital stay    You were hospitalized for a stroke caused by narrowing of the arteries that supply your brain. There is nothing that can be done to change how narrow they are, but some medicines can help to prevent worsening of the narrowing. You are now taking all of those medicines. You continue to be a risk of stroke in the future, but we have attempted to reduce that risk as much as is possible.     Adult [facility] Follow-up and recommended labs and tests    Follow up with Dr. Taylor , at (location with clinic name or city) [facility], within 4-8 weeks  for hospital follow- up. No follow up labs or test are needed.    Appointments on [place] and/or [facility] (with [facility] or [facility] provider or service). Call [phone] if you haven't heard regarding these appointments within 7 days of discharge.     Activity    Your activity upon discharge: activity as tolerated     MD face to face encounter    Documentation of Face to Face and Certification for Home Health Services    I certify that patient: Ariana Barnes is under my care and that I, or a nurse practitioner or physician's assistant working with me, had a face-to-face encounter that meets the physician face-to-face encounter requirements with this patient on: 6/11/2021.    This encounter with the patient was in whole, or in part, for the following medical condition, which is the primary reason for home health care: Ischemic Stroke.    I certify that, based on my findings, the following services are medically necessary home health services: Occupational Therapy, Physical Therapy and Speech Language Therapy.    My clinical  findings support the need for the above services because: Occupational Therapy Services are needed to assess and treat cognitive ability and address ADL safety due to impairment in To increase in functional mobility and activities of daily living, Physical Therapy Services are needed to assess and treat the following functional impairments: For home safety evaluation, strengthening, and increase independence with all functional mobility and gait.. and Speech Therapy Services are needed to assess and treat impairments in language and/or swallow functions due to Ischemic Stroke .    Further, I certify that my clinical findings support that this patient is homebound (i.e. absences from home require considerable and taxing effort and are for medical reasons or Congregational services or infrequently or of short duration when for other reasons) because: Requires assistance of another person or specialized equipment to access medical services because patient: Range of motion limitations prevents ability to exit home safely. and Requires supervision of another for safe transfer...    Based on the above findings. I certify that this patient is confined to the home and needs intermittent skilled nursing care, physical therapy and/or speech therapy.  The patient is under my care, and I have initiated the establishment of the plan of care.  This patient will be followed by a physician who will periodically review the plan of care.  Physician/Provider to provide follow up care: [facility]), [facility]    Attending hospital physician (the Medicare certified [place] provider): Spencer Solis,*  Physician Signature: See electronic signature associated with these discharge orders.  Date: 6/11/2021     Cardiac Event Monitor Adult Pediatric     Diet    Follow this diet upon discharge: Orders Placed This Encounter      Snacks/Supplements Adult: Ensure Enlive; Between Meals      Dysphagia Diet Level 2 [facility]  Altered Thin Liquids (water, ice chips, juice, milk gelatin, ice cream, etc); No Pork       Patient was seen and discussed with the Attending, Dr. Solis.    Mike Newman MD  Pager: [phone]

## 2021-06-11 NOTE — PHARMACY
Pharmacy Stroke Code Response  Pharmacist responded as part of the Stroke Code Team activation to [facility] emergency department.  The Stroke Team determined that the patient was not a candidate for IV thrombolytic therapy and the pharmacy team was dismissed at 20:39.     Bessy Pollard, PharmD  Pager: [phone]

## 2021-06-11 NOTE — PLAN OF CARE
OT: after conversation with PT post PT eval it is concluded that this pt in near or at current functional baseline. Pt receives assist from family 24/7 for all ADL from a cognitive and physical perspective. OT will hold services today pending pt progress, will assess ADL needs as indicated.

## 2021-06-11 NOTE — PROGRESS NOTES
06/11/21 1400   Quick Adds   Type of Visit Initial PT Evaluation       Present no   Language   (daughter intreprets)   Living Environment   People in home child(leny), adult   Current Living Arrangements house   Home Accessibility stairs to enter home   Number of Stairs, Main Entrance 3   Stair Railings, Main Entrance other (see comments)  (1 rail)   Transportation Anticipated family or friend will provide   Living Environment Comments patient technically lives alone but there is always a family member staying there   Self-Care   Usual Activity Tolerance fair   Current Activity Tolerance fair   Regular Exercise No   Equipment Currently Used at Home cane, straight;commode chair;shower chair;walker, rolling   Activity/Exercise/Self-Care Comment patient gets physical assistance with all mobility and ADLs   Disability/Function   Concentrating, Remembering or Making Decisions Difficulty yes   Concentration Management hx of dementia   Difficulty Communicating yes   Walking or Climbing Stairs Difficulty yes   Walking or Climbing Stairs ambulation difficulty, requires equipment;ambulation difficulty, assistance 1 person   Mobility Management uses cane or 4WW and assistance   Dressing/Bathing Difficulty yes   Dressing/Bathing bathing difficulty, assistance 1 person   Dressing/Bathing Management yes   Toileting issues yes   Fall history within last six months no   Change in Functional Status Since Onset of Current Illness/Injury no   General Information   Onset of Illness/Injury or Date of Surgery 06/10/21   Referring Physician Regan Troncoso MD   Patient/Family Therapy Goals Statement (PT) return home   Pertinent History of Current Problem (include personal factors and/or comorbidities that impact the POC) Ariana Barnes is a 81 year old Norwegian-speaking woman with history of T2DM, Alzheimer's disease, HTN, HLD, and right parietal infarct on 5/8/21 who presents as ED stroke code for word-finding  difficulty and slurred speech.  CT showed establishing infarct in the Left parietal lobe due to loss of gray-white differentiation, and CT perfusion showed lack of penumbra. Due to this, TNK was not offered due to lack of salvageable tissue. She is demented as baseline, although continues to have some WF difficulties.    Cognition   Orientation Status (Cognition) unable/difficult to assess;other (see comments)  (hx of dementia)   Follows Commands (Cognition) follows one-step commands  (responds to daughter's commands)   Posture    Posture Forward head position;Protracted shoulders   Range of Motion (ROM)   ROM Comment B LE/UE grossly WFL   Strength   Strength Comments demonstrated antigravity strength B UE/LE   Bed Mobility   Comment (Bed Mobility) supine > sit with min assist   Transfers   Transfer Safety Comments sit > stand with min assist   Gait/Stairs (Locomotion)   Comment (Gait/Stairs) ambulated in room with 4WW and min assist   Balance   Balance Comments able to maintain standing balance with UE support and SBA   Muscle Tone   Muscle Tone Comments dyskinesias noted    Clinical Impression   Criteria for Skilled Therapeutic Intervention yes, treatment indicated   PT Diagnosis (PT) impaired functional mobility in setting of stroke   Influenced by the following impairments impaired cognition, impaired balance, decreased activity tolerancee   Functional limitations due to impairments impaired bed mobility, impaired transfers, impaired gait   Clinical Presentation Stable/Uncomplicated   Clinical Presentation Rationale clinical judgement   Clinical Decision Making (Complexity) low complexity   Therapy Frequency (PT) 5x/week   Predicted Duration of Therapy Intervention (days/wks) 4 days   Planned Therapy Interventions (PT) balance training;bed mobility training;gait training;patient/family education;neuromuscular re-education;transfer training   Risk & Benefits of therapy have been explained evaluation/treatment  results reviewed;care plan/treatment goals reviewed   PT Discharge Planning    PT Discharge Recommendation (DC Rec) home with home care physical therapy   PT Rationale for DC Rec patient slightly below baseline regarding functional mobility. would benefit from home PT to ensure safe mobility in home setting with prior level of assitance   PT Brief overview of current status  transferred OOB with min assist.  ambulated 50' with 4WW and min  assist.  ambulated 30' without AD and min assist.    Total Evaluation Time   Total Evaluation Time (Minutes) 9

## 2021-06-11 NOTE — PROGRESS NOTES
[facility]    Stroke Progress Note    Interval Events  No acute events overnight. Continues to have some WF difficulties for nursing staff.     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  CT showed establishing infarct in the Left parietal lobe due to loss of gray-white differentiation, and CT perfusion showed lack of penumbra. Due to this, TNK was not offered due to lack of salvageable tissue. She is demented as baseline, although continues to have some WF difficulties.     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   - Neurochecks Q 4 hours  - Permissive HTN; labetalol PRN for SBP > 220  - Avoid hypotonic IV fluids  - Daily aspirin 81 mg for secondary stroke prevention  - Plavix (clopidogrel) 75 mg PO Daily  - Atorvastatin 80mg daily   - MRI Stroke Protocol  - TTE with Bubble Study  - Telemetry, EKG  - Bedside Glucose Monitoring  - Nutrition: Dysphagia 2 until seen by speech   - A1c, Lipid Panel   - PT/OT/SLP  - Stroke Education  - Stroke Class per [facility] ([facility]  - Depression Screen  - Apnea Screen  - Euthermia, Euglycemia     #Alzheimer's Disease  - Resume PTA donepezil      #DMII  - Hold PTA Glipizide  - LDISS  - A1C     #HTN  - Hold PTA amlodipine, Carvedilol and Losartan for now     #Hx of HLP  - PTA Lipitor 40 mg daily     #Hx of psychiatric illness  Family not sure of diagnosis, reportedly had psychiatric admission in 1990s. Was on Ziprasidone for years but this was stopped a couple months ago.       FEN: No IVF due to adequate PO; Electrolyte protocol; Regular diet  LDA: PIV  x2  PPx:   - DVT: Heparin SQ  - GI: none indicated  Code: FULL    Dispo: Floor, pending therapy evaluation      The patient was discussed with Stroke Fellow, Dr. Olson.  The Stroke Staff is Dr. Solis.    Mike Newman MD  Neurology Resident  Pager:  [phone]  ______________________________________________________    Medications   Home Meds  Prior to Admission medications    Medication Sig Start Date End Date Taking? Authorizing Provider   acetaminophen (TYLENOL) 325 MG tablet Take 325-650 mg by mouth every 6 hours as needed     Reported, Patient   amLODIPine (NORVASC) 10 MG tablet  3/12/21   Reported, Patient   aspirin 81 MG tablet Take 81 mg by mouth daily     Reported, Patient   atorvastatin (LIPITOR) 40 MG tablet Take 80 mg by mouth daily     Reported, Patient   CALCIUM PO Take 1 tablet by mouth daily     Unknown, Entered By History   carvedilol (COREG) 25 MG tablet  3/12/21   Reported, Patient   CEQUA 0.09 % SOLN  1/16/21   Reported, Patient   donepezil (ARICEPT) 5 MG tablet Take 1 tablet (5 mg) by mouth At Bedtime 5/17/21   Charly Taylor MD   GLIPIZIDE PO Dose unknown    Unknown, Entered By History   losartan (COZAAR) 100 MG tablet Take 100 mg by mouth daily    Unknown, Entered By History   multivitamin, therapeutic with minerals (THERA-VIT-M) TABS Take 1 tablet by mouth daily    Reported, Patient   OYSTER SHELL CALCIUM/D 500-200 MG-UNIT tablet  2/12/21   Reported, Patient   OZEMPIC, 0.25 OR 0.5 MG/DOSE, 2 MG/1.5ML SOPN  3/12/21   Reported, Patient   vitamin D3 (CHOLECALCIFEROL) 2000 units (50 mcg) tablet Take 2,000 Units by mouth daily     Unknown, Entered By History   ziprasidone (GEODON) 40 MG capsule  1/31/21   Reported, Patient       Scheduled Meds    aspirin  81 mg Oral Daily     atorvastatin  80 mg Oral Daily     clopidogrel  75 mg Oral or NG Tube Daily     donepezil  5 mg Oral At Bedtime     heparin ANTICOAGULANT  5,000 Units Subcutaneous Q12H     insulin aspart  1-3 Units Subcutaneous TID AC      insulin aspart  1-3 Units Subcutaneous At Bedtime     sodium chloride (PF)  3 mL Intracatheter Q8H       Infusion Meds    - MEDICATION INSTRUCTIONS -         PRN Meds  acetaminophen, glucose **OR** dextrose **OR** glucagon, lidocaine 4%, lidocaine (buffered or not buffered), - MEDICATION INSTRUCTIONS -, sodium chloride (PF)       PHYSICAL EXAMINATION  Temp:  [97.9  F (36.6  C)-99  F (37.2  C)] 99  F (37.2  C)  Pulse:  [] 95  Resp:  [12-18] 12  BP: ()/() 118/73  SpO2:  [94 %-100 %] 94 %     General:  patient lying in bed without any acute distress, confused  HEENT:  normocephalic/atraumatic, no oral lesions  Cardio:  RRR  Pulmonary:  no respiratory distress  Abdomen:  non-distended  Extremities:  pitting edema present  Skin:  warm/dry   Neurologic   Mental Status:  alert, does not answer orientation questions, unable to comprehend tasks and questions; aphasia of both receptive and expressive components, in the setting of known dementia  Cranial Nerves:  visual fields intact, pupils 4mm and sluggish (baseline), EOMI with normal smooth pursuit with mild Left preference, hearing not formally tested but intact to conversation, remainder of CN unable to assess  Motor:  normal muscle tone and bulk, no abnormal movements, able to move all limbs spontaneously  Reflexes:  toes down-going  Sensory:  light touch sensation intact and symmetric   Coordination:  GWEN due to aphasia  Station/Gait:  deferred     Stroke Scales  NIHSS  Interval baseline (06/10/21 2042)   Interval Comments     1a. Level of Consciousness 0-->Alert, keenly responsive   1b. LOC Questions 0-->Answers both questions correctly   1c. LOC Commands 1-->Performs one task correctly   2.   Best Gaze 0-->Normal   3.   Visual 0-->No visual loss   4.   Facial Palsy 0-->Normal symmetrical movements   5a. Motor Arm, Left 0-->No drift, limb holds 90 (or 45) degrees for full 10 secs   5b. Motor Arm, Right 0-->No drift, limb holds 90 (or 45) degrees  for full 10 secs   6a. Motor Leg, Left 0-->No drift, leg holds 30 degree position for full 5 secs   6b. Motor Leg, right 0-->No drift, leg holds 30 degree position for full 5 secs   7.   Limb Ataxia 0-->Absent   8.   Sensory 0-->Normal, no sensory loss   9.   Best Language 0-->No aphasia, normal   10. Dysarthria 0-->Normal   11. Extinction and Inattention  0-->No abnormality   Total 8 (06/10/21 2042)       Imaging  I personally reviewed all imaging; relevant findings per HPI.     Lab Results Data   CBC  Recent Labs   Lab 06/11/21  0609 06/10/21  2032   WBC 11.5* 14.4*   RBC 4.21 4.32   HGB 12.3 12.4   HCT 38.1 39.2    337     Basic Metabolic Panel    Recent Labs   Lab 06/11/21  0609 06/10/21  2032    138   POTASSIUM 3.4 4.0   CHLORIDE 107 103   CO2 26 29   BUN 16 22   CR 0.98 1.32*   GLC 86 114*   ELLI 9.0 9.7     Liver Panel  No results for input(s): PROTTOTAL, ALBUMIN, BILITOTAL, ALKPHOS, AST, ALT, BILIDIRECT in the last 168 hours.  INR    Recent Labs   Lab Test 06/10/21  2032 05/08/21  1319   INR 0.96 0.96      Lipid Profile    Recent Labs   Lab Test 06/10/21  2032   CHOL 164   HDL 40*   LDL 88   TRIG 180*     A1C    Recent Labs   Lab Test 06/10/21  2032 12/25/14  0559   A1C 6.5* 7.0*     Troponin I    Recent Labs   Lab 06/10/21  2032   TROPI <0.015

## 2021-06-11 NOTE — PROGRESS NOTES
06/11/21 1100   General Information   Onset of Illness/Injury or Date of Surgery 06/10/21   Referring Physician Regan Troncoso MD   Pertinent History of Current Problem Pt is a 81 year old [place]otswanan-speaking woman with history of T2DM, Alzheimer's disease, HTN, HLD, and right parietal infarct on 5/8/21 who presents as ED stroke code for word-finding difficulty and slurred speech.  CT showed establishing infarct in the Left parietal lobe due to loss of gray-white differentiation, and CT perfusion showed lack of penumbra. Due to this, TNK was not offered due to lack of salvageable tissue. She is demented as baseline, although continues to have some WF difficulties.    General Observations Swallow evaluation completed at 1115   Past History of Dysphagia None per EMR review or pt/family report. Discussed pts baseline diet with family present who reports pt typically has softer foods like rice and oatmeal at baseline. Pt will occasionally have meats however these are cooked until very soft and cut up for pt.    Type of Evaluation   Type of Evaluation Swallow Evaluation   Oral Motor   Oral Musculature generally intact   Structural Abnormalities none present   Mucosal Quality good   Dentition (Oral Motor)   Dentition (Oral Motor) natural dentition   Facial Symmetry (Oral Motor)   Facial Symmetry (Oral Motor) unable/difficult to assess  (Pt not following commands due to baseline dementia )   Lip Function (Oral Motor)   Lip Range of Motion (Oral Motor) unable/difficult to assess   Comment, Lip Function (Oral Motor) Pt not following commands due to baseline dementia    Tongue Function (Oral Motor)   Tongue ROM (Oral Motor) unable/difficult to assess   Comment, Tongue Function (Oral Motor) Pt not following commands due to baseline dementia    Cough/Swallow/Gag Reflex (Oral Motor)   Volitional Throat Clear/Cough (Oral Motor) unable/difficult to assess   Volitional Swallow (Oral Motor) unable/difficult to assess   Vocal  Quality/Secretion Management (Oral Motor)   Vocal Quality (Oral Motor) WFL   Secretion Management (Oral Motor) WNL   General Swallowing Observations   Current Diet/Method of Nutritional Intake (General Swallowing Observations, NIS) dysphagia level 2 (mechanically altered);thin liquids   Respiratory Support (General Swallowing Observations) none   Swallowing Evaluation Clinical swallow evaluation   Clinical Swallow Evaluation   Feeding Assistance minimal assistance required   Additional evaluation(s) completed today No   Clinical Swallow Evaluation Textures Trialed Thin Liquids;Puree Textures;Solid Foods   Clinical Swallow Eval: Thin Liquid Texture Trial   Mode of Presentation, Thin Liquids cup;straw;self-fed;fed by clinician   Volume of Liquid or Food Presented 5 oz   Oral Phase of Swallow WFL   Pharyngeal Phase of Swallow intact   Diagnostic Statement Adequate oral phase with no overt s/s aspiration.   Clinical Swallow Evaluation: Puree Solid Texture Trial   Mode of Presentation, Puree spoon   Volume of Puree Presented 2 tbs   Oral Phase, Puree WFL   Pharyngeal Phase, Puree intact   Diagnostic Statement Adequate oral phase with no overt s/s aspiration.   Clinical Swallow Evaluation: Solid Food Texture Trial   Mode of Presentation, Solid fed by clinician   Volume of Solid Food Presented 1 cracker   Oral Phase, Solid Poor AP movement   Pharyngeal Phase, Solid intact   Diagnostic Statement Slow, effortful mastication. Difficulty biting piece of cracker however complete mastication, able to clear with single swallow, no overt s/s aspiration noted.   Esophageal Phase of Swallow   Patient reports or presents with symptoms of esophageal dysphagia No   Swallowing Recommendations   Diet Consistency Recommendations dysphagia level 2 (mechanically altered);thin liquids   Supervision Level for Intake 1:1 supervision needed   Mode of Delivery Recommendations bolus size, small   Swallowing Maneuver Recommendations alternate food  and liquid intake   Monitoring/Assistance Required (Eating/Swallowing) check mouth frequently for oral residue/pocketing;stop eating activities when fatigue is present;monitor for cough or change in vocal quality with intake   Recommended Feeding/Eating Techniques (Swallow Eval) maintain upright sitting position for eating   Medication Administration Recommendations, Swallowing (SLP) whole with thin or puree    Instrumental Assessment Recommendations instrumental evaluation not recommended at this time   SLP Therapy Assessment/Plan   Criteria for Skilled Therapeutic Interventions Met (SLP Eval) no problems identified which require skilled intervention   SLP Diagnosis Mild oral dysphagia noted however suspect this is close to pts baseline function    Rehab Potential (SLP Eval) good, to achieve stated therapy goals   Therapy Frequency (SLP Eval) 5 times/wk   Predicted Duration of Therapy Intervention (SLP Eval) 1 week   Comment, Therapy Assessment/Plan (SLP) SLP: Bedside swallow evaluation completed per MD orders. Pt not following commands due to baseline dementia to participate in oral mechanism exam. Pt presents with mild oral dysphagia however suspect this is at or close to pts baseline functioning given family report. Recommend pt continue on current diet of DD2/thin liquids, meds whole with puree or thin liquid.  Pt was assessed with puree and regular solids and thin liquids. Pt demonstrated slow, effortful mastication with regular solids with increased difficulty taking bite off of cracker. However pt did have complete mastication, able to clear with single swallow, and no overt s/s aspiration noted. Pt also presents with adequate oral phase and no overt s/s aspiration with puree solids and thin liquids presented via cup and straw. Recommend pt continue on DD2/thin liquid diet, suspect this is close to pts baseline diet. Per family report pt eats softer foods at home (I.e. rice and oatmeal) she will have meats  on occasion however family cooks until very soft and cuts up for the pt. Pt may also benefit from further speech-language evaluation with inperson  however question pts participation due to baseline dementia.    Therapy Plan Review/Discharge Plan (SLP)   Therapy Plan Review (SLP) evaluation/treatment results reviewed;care plan/treatment goals reviewed;risks/benefits reviewed;current/potential barriers reviewed;participants voiced agreement with care plan;participants included;patient;daughter   SLP Discharge Planning    SLP Discharge Recommendation (DC Rec) home with outpatient speech therapy   SLP Rationale for DC Rec Ensure diet tolerance and further speech-languge evaluation warranted    SLP Brief overview of current status  Recommend pt continue on current diet of DD2/thin liquids. Meds okay whole in puree or with thin liquid. Suspect pts swallow function is at or close to baseline functioning. Speech therapy will continue to follow to ensure diet tolernce and advance diet as appropriate. Pt may benefit from further speech language evaluation given family report of continued word finding difficulties however questions pts ablility to participate in speech language evaluation given pts baseline dementia.     Total Evaluation Time   Total Evaluation Time (Minutes) 20

## 2021-06-11 NOTE — ED NOTES
Writer entered room to administer medications that were held d/t NPO status because speech therapist cleared patient to take oral medications and eat/drink per orders. Daughter stated that she already gave patient all of her medications for the day. Writer educated daughter that medications should only be administered by staff while patient is in the hospital. Daughter agreed not to administer any other medications to patient during hospital stay.   Johnna Garibay RN on 6/11/2021 at 11:37 AM

## 2021-06-11 NOTE — PROGRESS NOTES
Stroke Code Nurse-Responder Note    Arrival Time to Stroke Code: 2035    Stroke Code Team interventions:   - possible to IR    ED/Bedside Nurse providing handoff: Sadaf    Time left for CT: 2040    Time arrived to next location (ED/Unit/IR): 2058    ED/Bedside Nurse given handoff (name/time): Sadaf Rodriguez RN

## 2021-06-11 NOTE — H&P
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 Unfortunately, no in-person  was available in ED, so I had to rely on family for translations in this emergent situation. Per daughter, patient was in her normal state of health at 3:30PM. She then took a nap and woke up around 6:30 with slurred speech and paucity of words. Stroke code was called at 8:30, by which time she was out of the safe TNK window. Pt takes daily aspirin and is not on anticoagulation. She scored 8 points on my NIHSS exam and was able to say some phrases in Kosovan such as \"what is going on\" but had difficulty with other words like her name. Pt had emergent CTH, CTA, CTP, the latter of which was positive for left parietal infarct. There was focal occlusion of left M3 in left MCA as well.     Pt had MRI on 5/8 as part of dementia work-up that incidentally picked up a right parietal stroke. She presented to ED for further evaluations/stroke cares that day. Per note from my colleague, Dr. Lassiter, she was not candidate for TPA or thrombectomy. CTA in ED at the time showed severe stenosis in distal branches of most bilateral cerebral arteries. Inpatient admission was recommended however patient/family wanted to be discharged with outpatient follow-up. Her outpatient neurologist was going to set her up for cerebral angiogram and TTE, however, this had not yet occurred.     TPA Treatment   Not given due to head trauma/stroke within the past 3 months.    Endovascular Treatment  Not initiated due to absence of proximal vessel occlusion    Impression  1. 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   - Admit to Neurology  - Neurochecks Q 4 hours  - Permissive HTN; labetalol PRN for SBP > 220  - Avoid hypotonic IV fluids  - Daily aspirin 81 mg for secondary stroke prevention  - Plavix 300mg load   - Plavix (clopidogrel) 75 mg PO Daily  - Day team can can consider risks/benefits of cerebral angiogram with IR   - Statin: Atorvastatin 80mg daily   - MRI Stroke Protocol  - MRA Stroke Protocol  - TTE with Bubble Study  - Telemetry, EKG  - Bedside Glucose Monitoring  - Nutrition: Dysphagia 2 until seen by speech   - A1c, Lipid Panel, Troponin x 3  - PT/OT/SLP  - PM&R not indicated due to inability to participate in intensive rehab in ARU, lack of severe deficits.   - Stroke Education  - Stroke Class per [facility]  - Depression Screen  - Apnea Screen  - Euthermia, Euglycemia    #Alzheimer's Disease  - Resume PTA donepezil     #DMII  - Hold PTA Glipizide  - LDISS  - A1C     #HTN  - Hold PTA amlodipine, Carvedilol and Losartan     #Hx of HLP  -Cont PTA Lipitor 40 mg daily    #Hx of psychiatric illness  Family not sure of diagnosis, reportedly had psychiatric admission in 1990s. Was on Ziprasidone for years but this was stopped a couple months ago.         Prophylaxis            For VTE Prevention:  - heparin SQ    For Acid Suppression:  - GI prophylaxis is not indicated    Code Status  Full Code    During initial physical assessment, the plan of care was discussed and developed with patient and child.  Plan of care includes: stroke workup.    Patient was admitted via [facility] ED ([place])    The patient will be admitted to the Neuro Critical Care/Stroke team..     The patient was discussed with Stroke Fellow, Dr. Olson.  The Stroke Staff is Dr. Bacon. Patient to be seen by Dr. Solis in AM.     Regan Troncoso MD  Neurology Resident  Pager:   [phone]  ___________________________________________________     Nutrition:   Orders Placed This Encounter      NPO for Medical/Clinical Reasons Except for: No Exceptions    Past Medical History   Past Medical History:   Diagnosis Date     Abdominal pain, chronic, right lower quadrant 12/22/2014     Dementia (H)      Depressive disorder      Diabetes mellitus (H)      Hypertension      Influenza 12/24/2014     Syncope 3/5/2020     Past Surgical History   History reviewed. No pertinent surgical history.  Medications   I have reviewed the medication list.       Allergies   Allergies   Allergen Reactions     Dicyclomine Unknown and Dizziness     Started at [facility] 10/17; d/c'd shortly after due to SEs     Hydrochlorothiazide Unknown     Stopped taking due to abdominal pain     Isosorbide Other (See Comments)     Severe leg pain  Leg pain  Severe leg pain       Family History   Family History   Problem Relation Age of Onset     Unknown/Adopted Mother      Unknown/Adopted Father      Social History   Social History     Tobacco Use     Smoking status: Never Smoker     Smokeless tobacco: Never Used   Substance Use Topics     Alcohol use: No     Drug use: No       Review of Systems   Unable to obtain due to patient's aphasia        PHYSICAL EXAMINATION  Temp:  [97.9  F (36.6  C)] 97.9  F (36.6  C)  Pulse:  [86-93] 89  Resp:  [12-18] 12  BP: (111-126)/(52-85) 126/85  SpO2:  [97 %-98 %] 98 %    General:  patient lying in bed without any acute distress    Head:  normocephalic/atraumatic  Cardio: mild pitting edema of lower extremities   Pulmonary:  no respiratory distress  Abdomen:  soft, non-tender  Skin:  intact, warm/dry   Psych: anxious    Neurologic  Mental Status:  Alert, does not respond to orientation questions, does not know her name, location. Can identify her daughter. Follows most simple commands (show me thumbs up, wiggle your toes, stick your tongue out) with encouragement. Impaired naming, repetition.    Cranial Nerves:  Blinks to threat in all directions. PERRL, Able to look down and to sides without nystagmus. Unable to test upward gaze due to patient noncompliance. Unable to assess facial sensation. Face symmetric at rest and with activation. Hearing not formally tested but intact to conversation. Unable to assess uvula, dysarthria is present. Midline tongue protrusion.    Motor:  Normal muscle tone and bulk, no abnormal movements, able to move all limbs spontaneously, hand  strong both hands. Able to lift all 4 extremities antigravity x 10 seconds without drift with exception of RLE drops a little. No pronator drift.   Sensory: Does not follow commands for accurate testing. Intact to noxious stim x 4 extremities.   Coordination:  Does not follow commands for accurate testing.  Station/Gait:  Does not follow commands for accurate testing.    Dysphagia Screen  Dysarthria or facial droop present - Maintain NPO, consult SLP    Modified Hendricks Scale (pre-stroke):   3-Moderate disability; requiring some help, but able to walk without assistance     Stroke Scales    NIHSS  Interval baseline (06/10/21 2042)   Interval Comments     1a. Level of Consciousness 0-->Alert, keenly responsive   1b. LOC Questions 2-->Answers neither question correctly   1c. LOC Commands 1-->Performs one task correctly   2.   Best Gaze 0-->Normal   3.   Visual 0-->No visual loss   4.   Facial Palsy 0-->Normal symmetrical movements   5a. Motor Arm, Left 0-->No drift, limb holds 90 (or 45) degrees for full 10 secs   5b. Motor Arm, Right 0-->No drift, limb holds 90 (or 45) degrees for full 10 secs   6a. Motor Leg, Left 0-->No drift, leg holds 30 degree position for full 5 secs   6b. Motor Leg, right 1-->Drift, leg falls by the end of the 5-sec period but does not hit bed   7.   Limb Ataxia 0-->Absent   8.   Sensory 1-->Mild-to-moderate sensory loss, patient feels pinprick is less sharp or is dull on the affected side, or there is a loss of  superficial pain with pinprick, but patient is aware of being touched   9.   Best Language 2-->Severe aphasia, all communication is through fragmentary expression, great need for inference, questioning, and guessing by the listener. Range of information that can be exchanged is limited, listener carries burden of. . . (see row details)   10. Dysarthria 1-->Mild-to-moderate dysarthria, patient slurs at least some words and, at worst, can be understood with some difficulty   11. Extinction and Inattention  0-->No abnormality   Total 8 (06/10/21 2042)       Imaging  I personally reviewed all imaging; relevant findings per the HPI.    Lab Results Data   CBC  Recent Labs   Lab 06/10/21  2032   WBC 14.4*   RBC 4.32   HGB 12.4   HCT 39.2        Basic Metabolic Panel   Recent Labs   Lab 06/10/21  2032      POTASSIUM 4.0   CHLORIDE 103   CO2 29   BUN 22   CR 1.32*   *   ELLI 9.7     Liver Panel  No results for input(s): PROTTOTAL, ALBUMIN, BILITOTAL, ALKPHOS, AST, ALT, BILIDIRECT in the last 168 hours.  INR    Recent Labs   Lab Test 06/10/21  2032 05/08/21  1319   INR 0.96 0.96      Lipid Profile  No lab results found.  A1C    Recent Labs   Lab Test 12/25/14  0559   A1C 7.0*     Troponin I    Recent Labs   Lab 06/10/21  2032   TROPI <0.015          Stroke Code / Stroke Consult Data Data    Stroke code activated 06/10/21   2028   First stroke provider response     2030   Last known normal     1530   Time of discovery   (or onset of symptoms) 06/10/21   1830   Head CT read by Stroke Neuro Dr/Provider     2106   Was stroke code de-escalated? No 06/10/21 2130  other (see comments) not a candidate for TPA or thrombectomy

## 2021-06-11 NOTE — CONSULTS
06/11/21 1256 Klisch, Christine M, RN     Stroke Education Note     The following information has been reviewed with the family:     1. Warning signs of stroke     2. Calling 911 if having warning signs of stroke     3. All modifiable risk factors: hypertension, CAD, atrial fib, diabetes, hypercholesterolemia, smoking, substance abuse, diet, physical inactivity, obesity, sleep apnea.     4. Patient's risk factors for stroke which include: HTN,HLD,DM     5. Follow-up plan for after discharge     6. Discharge medications which include: aspirin,plavix,lipitor,norvasc,cozaar,coreg     In addition, the above information was given to the family in writing as a part of the [facility] Stroke Class Handout.     Learner's response to risk factors / lifestyle modification education: Ability to change      Christine M Klisch, RN

## 2021-06-11 NOTE — PROGRESS NOTES
"CLINICAL NUTRITION SERVICES - ASSESSMENT NOTE     Nutrition Prescription    RECOMMENDATIONS FOR MDs/PROVIDERS TO ORDER:  Encourage PO intake    Malnutrition Status:    Patient does not meet two of the established criteria necessary for diagnosing malnutrition but is at risk for malnutrition    Recommendations already ordered by Registered Dietitian (RD):  Ensure Enlive (strawberry) at 2pm snack    Future/Additional Recommendations:  Monitor adequacy of PO intake. If documentation indicates that pt is consuming <50% nutritionally adequate meals TID, recommend:    Provide additional nutrition education on strategies to increase PO intake    Adjust supplement schedule per pt preference    Calorie Counts     REASON FOR ASSESSMENT  Ariana Barnes is a/an 81 year old female assessed by the dietitian for Provider Order - To assess nutritional status and make recommendations for stroke patient    Clinical History: T2DM, Alzheimer's disease, HTN, HLD, and right parietal infarct on 5/8/21 who presents as ED stroke code for word-finding difficulty and slurred speech.    NUTRITION HISTORY  Discussed nutrition history w/ pt and family present. Ariana has had a decreased appetite x2 weeks. She usually eats soft foods like rice, oatmeal, and soft meats. Per the pt's family, Ariana doesn't like hospital food very much and prefers for her family to bring in food.     CURRENT NUTRITION ORDERS  Diet: Dysphagia Level 2:  Mechanically Altered, thin liquids per SLP  Intake/Tolerance: No meals ordered since admit     LABS  Labs reviewed  A1C 6.5 (H)    MEDICATIONS  Medications reviewed  Novolog    ANTHROPOMETRICS  Height: 152.4 cm (5' 0\")  Most Recent Weight: 62.8 kg (138 lb 8 oz)    IBW: 45.5 kg (138%)  BMI: Overweight BMI 25-29.9  Weight History: Weight stable over the past 2 months.   Wt Readings from Last 5 Encounters:   06/10/21 62.8 kg (138 lb 8 oz)   05/17/21 63.7 kg (140 lb 6.4 oz)   05/08/21 63.5 kg (140 lb)   04/08/21 63.5 kg " (140 lb)   03/05/20 60.3 kg (133 lb)     Dosing Weight: 50 kg (adjusted based on admit wt of 62.8 kg and IBW of 45.5 kg)    ASSESSED NUTRITION NEEDS  Estimated Energy Needs: 8005-4485 kcals/day (25 - 30 kcals/kg)  Justification: Maintenance  Estimated Protein Needs: 50-60 grams protein/day (1 - 1.2 grams of pro/kg)  Justification: Preservation of LBM  Estimated Fluid Needs: (1 mL/kcal)   Justification: Maintenance    PHYSICAL FINDINGS  See malnutrition section below.    MALNUTRITION  % Intake: < 75% for > 7 days (non-severe)  % Weight Loss: None noted  Subcutaneous Fat Loss: None observed  Muscle Loss: None observed  Fluid Accumulation/Edema: None noted  Malnutrition Diagnosis: Patient does not meet two of the established criteria necessary for diagnosing malnutrition but is at risk for malnutrition    NUTRITION DIAGNOSIS  Inadequate oral intake related to decreased appetite and limited diet order as evidenced by pt and family report and DD2 diet order      INTERVENTIONS  Implementation  Nutrition Education: Discussed current PO intake/appetite and role of RD w/ pt and family. Discussed nutrition supplement options.    Medical food supplement therapy: Ensure Enlive (strawberry) snack    Goals  Patient to consume % of nutritionally adequate meal trays TID, or the equivalent with supplements/snacks.     Monitoring/Evaluation  Progress toward goals will be monitored and evaluated per protocol.    Johnna Gauthier RD, LD  6D RD pager [phone]

## 2021-06-13 NOTE — PLAN OF CARE
Speech Language Therapy Discharge Summary    Reason for therapy discharge:    Discharged to home with home therapy.    Progress towards therapy goal(s). See goals on Care Plan in [facility] electronic health record for goal details.  Goals partially met.  Barriers to achieving goals:   discharge from facility.    Therapy recommendation(s):    Continued therapy is recommended.  Rationale/Recommendations:  Recommend pt continue on current diet of DD2/thin liquids. Meds okay whole in puree or with thin liquid. Suspect pts swallow function is at or close to baseline functioning. Speech therapy will continue to follow to ensure diet tolernce and advance diet as appropriate. Pt may benefit from further speech language evaluation given family report of continued word finding difficulties however questions pts ablility to participate in speech language evaluation given pts baseline dementia. .

## 2021-06-14 ENCOUNTER — PATIENT OUTREACH (OUTPATIENT)
Dept: CARE COORDINATION | Facility: CLINIC | Age: 81
End: 2021-06-14

## 2021-06-14 DIAGNOSIS — Z71.89 OTHER SPECIFIED COUNSELING: ICD-10-CM

## 2021-06-14 NOTE — PLAN OF CARE
Physical Therapy Discharge Summary    Reason for therapy discharge:    Discharged to home with home therapy.    Progress towards therapy goal(s). See goals on Care Plan in [facility] electronic [facility] for goal details.  Goals partially met.  Barriers to achieving goals:   discharge from facility.    Therapy recommendation(s):    Continued therapy is recommended.  Rationale/Recommendations:  Will benefit from continued PT in home health setting.

## 2021-06-14 NOTE — PROGRESS NOTES
Clinic Care Coordination Contact      Patient has discharged to another healthcare facility, skilled nursing facility or group home. Will cancel/close post-hospital call rom [facility] at this time.     Argelia Ureña RN  [facility], [facility]

## 2021-06-16 ENCOUNTER — TELEPHONE (OUTPATIENT)
Dept: NEUROLOGY | Facility: CLINIC | Age: 81
End: 2021-06-16

## 2021-06-16 NOTE — TELEPHONE ENCOUNTER
Dr. Taylor-  Patient was referred to [facility] services following stroke. Patient is declining home care services at this time. We will cancel referral. Thank you!  Ivonne Campos

## 2021-06-22 ENCOUNTER — TELEPHONE (OUTPATIENT)
Dept: NEUROLOGY | Facility: CLINIC | Age: 81
End: 2021-06-22

## 2021-06-22 NOTE — TELEPHONE ENCOUNTER
Will contact patient to discuss testing ordered 5/17/2021. Follow up was canceled for today due to not being completed.   Tiki Rm CMA on 6/22/2021 at 9:26 AM

## 2021-06-22 NOTE — TELEPHONE ENCOUNTER
Spoke with Ali, patients son, and they will [facility]  and angiogram was re-faxed. Once they have this scheduled, they will call me back and I will get Ariana added on to our schedule to see Dr. Brandon Rm, [facility] on 6/22/2021 at 9:52 AM

## 2023-02-22 NOTE — ED TRIAGE NOTES
Interval History:     Review of Systems   Constitutional:  Negative for chills and fever.   HENT:  Positive for hearing loss. Negative for congestion.    Eyes:  Negative for pain and discharge.   Respiratory:  Negative for cough, chest tightness, shortness of breath, wheezing and stridor.    Cardiovascular:  Negative for chest pain, palpitations and leg swelling.   Gastrointestinal:  Negative for abdominal distention, abdominal pain, blood in stool, diarrhea, nausea and vomiting.   Endocrine: Negative for cold intolerance, polydipsia and polyuria.   Genitourinary:  Negative for difficulty urinating, dysuria, frequency, hematuria and urgency.   Musculoskeletal:  Negative for arthralgias, back pain and neck pain.   Neurological:  Negative for dizziness, seizures, syncope, weakness, numbness and headaches.   Hematological:  Negative for adenopathy.   Psychiatric/Behavioral:  Negative for behavioral problems.    All other systems reviewed and are negative.  Objective:     Vital Signs (Most Recent):  Temp: 98.8 °F (37.1 °C) (02/21/23 1943)  Pulse: 85 (02/21/23 1943)  Resp: 20 (02/21/23 1943)  BP: (!) 181/83 (02/21/23 1943)  SpO2: 98 % (02/21/23 1943)   Vital Signs (24h Range):  Temp:  [97.4 °F (36.3 °C)-98.8 °F (37.1 °C)] 98.8 °F (37.1 °C)  Pulse:  [68-85] 85  Resp:  [20] 20  SpO2:  [96 %-100 %] 98 %  BP: (108-181)/(51-83) 181/83     Weight: 113.9 kg (251 lb)  Body mass index is 43.08 kg/m².    Intake/Output Summary (Last 24 hours) at 2/21/2023 2023  Last data filed at 2/21/2023 1740  Gross per 24 hour   Intake 290 ml   Output 750 ml   Net -460 ml      Physical Exam  Vitals reviewed.   Constitutional:       Appearance: Normal appearance.      Interventions: She is not intubated.  HENT:      Head: Normocephalic and atraumatic.      Ears:      Comments: Patient is very hard of hearing.     Nose: Nose normal.      Mouth/Throat:      Pharynx: Oropharynx is clear.   Eyes:      Extraocular Movements: Extraocular movements  Pt. Presents to ED with family after waking up from nap with AMS. Family reports patient more confused, slurred speech, illogical speech, and walking different. Pt. Ataxic while trying to walk to bed with assist. AVSS on RA. Family interpreting at the bedside.    intact.      Conjunctiva/sclera: Conjunctivae normal.      Pupils: Pupils are equal, round, and reactive to light.   Cardiovascular:      Rate and Rhythm: Normal rate.      Heart sounds: Normal heart sounds. No murmur heard.  Pulmonary:      Effort: Pulmonary effort is normal. She is not intubated.      Breath sounds: Normal breath sounds.   Abdominal:      General: Abdomen is flat. Bowel sounds are normal.      Palpations: Abdomen is soft.   Musculoskeletal:         General: Normal range of motion.      Cervical back: Normal range of motion and neck supple.      Right lower leg: No edema.      Left lower leg: No edema.   Skin:     General: Skin is warm and dry.      Capillary Refill: Capillary refill takes less than 2 seconds.   Neurological:      General: No focal deficit present.      Mental Status: She is alert and oriented to person, place, and time.   Psychiatric:         Mood and Affect: Mood normal.         Behavior: Behavior normal.       Significant Labs: All pertinent labs within the past 24 hours have been reviewed.    Significant Imaging: I have reviewed all pertinent imaging results/findings within the past 24 hours.

## 2025-04-28 ENCOUNTER — APPOINTMENT (OUTPATIENT)
Dept: CT IMAGING | Facility: CLINIC | Age: 85
DRG: 065 | End: 2025-04-28
Attending: STUDENT IN AN ORGANIZED HEALTH CARE EDUCATION/TRAINING PROGRAM
Payer: COMMERCIAL

## 2025-04-28 ENCOUNTER — HOSPITAL ENCOUNTER (INPATIENT)
Facility: CLINIC | Age: 85
LOS: 2 days | Discharge: HOME | DRG: 065 | End: 2025-04-30
Attending: STUDENT IN AN ORGANIZED HEALTH CARE EDUCATION/TRAINING PROGRAM | Admitting: HOSPITALIST
Payer: COMMERCIAL

## 2025-04-28 ENCOUNTER — APPOINTMENT (OUTPATIENT)
Dept: MRI IMAGING | Facility: CLINIC | Age: 85
DRG: 065 | End: 2025-04-28
Attending: STUDENT IN AN ORGANIZED HEALTH CARE EDUCATION/TRAINING PROGRAM
Payer: COMMERCIAL

## 2025-04-28 DIAGNOSIS — I63.312 CEREBROVASCULAR ACCIDENT (CVA) DUE TO THROMBOSIS OF LEFT MIDDLE CEREBRAL ARTERY (H): Primary | ICD-10-CM

## 2025-04-28 DIAGNOSIS — I63.9 ACUTE CVA (CEREBROVASCULAR ACCIDENT) (H): ICD-10-CM

## 2025-04-28 DIAGNOSIS — G93.89 CEREBRAL VENTRICULOMEGALY: ICD-10-CM

## 2025-04-28 DIAGNOSIS — I45.2 BIFASCICULAR BLOCK: ICD-10-CM

## 2025-04-28 DIAGNOSIS — R53.1 RIGHT SIDED WEAKNESS: ICD-10-CM

## 2025-04-28 DIAGNOSIS — R13.12 OROPHARYNGEAL DYSPHAGIA: ICD-10-CM

## 2025-04-28 DIAGNOSIS — R90.89 ABNORMAL BRAIN MRI: ICD-10-CM

## 2025-04-28 LAB
ALBUMIN SERPL BCG-MCNC: 3.9 G/DL (ref 3.5–5.2)
ALP SERPL-CCNC: 92 U/L (ref 40–150)
ALT SERPL W P-5'-P-CCNC: 12 U/L (ref 0–50)
ANION GAP SERPL CALCULATED.3IONS-SCNC: 10 MMOL/L (ref 7–15)
AST SERPL W P-5'-P-CCNC: 16 U/L (ref 0–45)
BASOPHILS # BLD AUTO: 0 10E3/UL (ref 0–0.2)
BASOPHILS NFR BLD AUTO: 1 %
BILIRUB SERPL-MCNC: 0.5 MG/DL
BUN SERPL-MCNC: 16.2 MG/DL (ref 8–23)
CALCIUM SERPL-MCNC: 9.3 MG/DL (ref 8.8–10.4)
CHLORIDE SERPL-SCNC: 99 MMOL/L (ref 98–107)
CREAT SERPL-MCNC: 0.72 MG/DL (ref 0.51–0.95)
EGFRCR SERPLBLD CKD-EPI 2021: 81 ML/MIN/1.73M2
EOSINOPHIL # BLD AUTO: 0.4 10E3/UL (ref 0–0.7)
EOSINOPHIL NFR BLD AUTO: 5 %
ERYTHROCYTE [DISTWIDTH] IN BLOOD BY AUTOMATED COUNT: 13 % (ref 10–15)
FLUAV RNA SPEC QL NAA+PROBE: NEGATIVE
FLUBV RNA RESP QL NAA+PROBE: NEGATIVE
GLUCOSE BLDC GLUCOMTR-MCNC: 122 MG/DL (ref 70–99)
GLUCOSE BLDC GLUCOMTR-MCNC: 88 MG/DL (ref 70–99)
GLUCOSE SERPL-MCNC: 184 MG/DL (ref 70–99)
HCO3 SERPL-SCNC: 28 MMOL/L (ref 22–29)
HCT VFR BLD AUTO: 42.5 % (ref 35–47)
HGB BLD-MCNC: 13.7 G/DL (ref 11.7–15.7)
HOLD SPECIMEN: NORMAL
IMM GRANULOCYTES # BLD: 0 10E3/UL
IMM GRANULOCYTES NFR BLD: 0 %
LYMPHOCYTES # BLD AUTO: 2.4 10E3/UL (ref 0.8–5.3)
LYMPHOCYTES NFR BLD AUTO: 30 %
MCH RBC QN AUTO: 29.1 PG (ref 26.5–33)
MCHC RBC AUTO-ENTMCNC: 32.2 G/DL (ref 31.5–36.5)
MCV RBC AUTO: 90 FL (ref 78–100)
MONOCYTES # BLD AUTO: 0.4 10E3/UL (ref 0–1.3)
MONOCYTES NFR BLD AUTO: 5 %
NEUTROPHILS # BLD AUTO: 4.9 10E3/UL (ref 1.6–8.3)
NEUTROPHILS NFR BLD AUTO: 59 %
NRBC # BLD AUTO: 0 10E3/UL
NRBC BLD AUTO-RTO: 0 /100
PLATELET # BLD AUTO: 276 10E3/UL (ref 150–450)
POTASSIUM SERPL-SCNC: 3.5 MMOL/L (ref 3.4–5.3)
PROT SERPL-MCNC: 7.4 G/DL (ref 6.4–8.3)
RBC # BLD AUTO: 4.71 10E6/UL (ref 3.8–5.2)
RSV RNA SPEC NAA+PROBE: NEGATIVE
SARS-COV-2 RNA RESP QL NAA+PROBE: NEGATIVE
SODIUM SERPL-SCNC: 137 MMOL/L (ref 135–145)
TROPONIN T SERPL HS-MCNC: 12 NG/L
TROPONIN T SERPL HS-MCNC: 8 NG/L
WBC # BLD AUTO: 8.3 10E3/UL (ref 4–11)

## 2025-04-28 PROCEDURE — 84155 ASSAY OF PROTEIN SERUM: CPT | Performed by: EMERGENCY MEDICINE

## 2025-04-28 PROCEDURE — 84484 ASSAY OF TROPONIN QUANT: CPT | Performed by: STUDENT IN AN ORGANIZED HEALTH CARE EDUCATION/TRAINING PROGRAM

## 2025-04-28 PROCEDURE — 70551 MRI BRAIN STEM W/O DYE: CPT

## 2025-04-28 PROCEDURE — 70450 CT HEAD/BRAIN W/O DYE: CPT

## 2025-04-28 PROCEDURE — 70544 MR ANGIOGRAPHY HEAD W/O DYE: CPT

## 2025-04-28 PROCEDURE — 93005 ELECTROCARDIOGRAM TRACING: CPT

## 2025-04-28 PROCEDURE — 99285 EMERGENCY DEPT VISIT HI MDM: CPT | Mod: 25

## 2025-04-28 PROCEDURE — 80053 COMPREHEN METABOLIC PANEL: CPT | Performed by: STUDENT IN AN ORGANIZED HEALTH CARE EDUCATION/TRAINING PROGRAM

## 2025-04-28 PROCEDURE — 36415 COLL VENOUS BLD VENIPUNCTURE: CPT | Performed by: EMERGENCY MEDICINE

## 2025-04-28 PROCEDURE — 82962 GLUCOSE BLOOD TEST: CPT

## 2025-04-28 PROCEDURE — 70496 CT ANGIOGRAPHY HEAD: CPT

## 2025-04-28 PROCEDURE — 83036 HEMOGLOBIN GLYCOSYLATED A1C: CPT

## 2025-04-28 PROCEDURE — 80061 LIPID PANEL: CPT

## 2025-04-28 PROCEDURE — 99223 1ST HOSP IP/OBS HIGH 75: CPT | Performed by: HOSPITALIST

## 2025-04-28 PROCEDURE — 85004 AUTOMATED DIFF WBC COUNT: CPT | Performed by: EMERGENCY MEDICINE

## 2025-04-28 PROCEDURE — 85025 COMPLETE CBC W/AUTO DIFF WBC: CPT | Performed by: STUDENT IN AN ORGANIZED HEALTH CARE EDUCATION/TRAINING PROGRAM

## 2025-04-28 PROCEDURE — 120N000001 HC R&B MED SURG/OB

## 2025-04-28 PROCEDURE — 36415 COLL VENOUS BLD VENIPUNCTURE: CPT | Performed by: STUDENT IN AN ORGANIZED HEALTH CARE EDUCATION/TRAINING PROGRAM

## 2025-04-28 PROCEDURE — 87637 SARSCOV2&INF A&B&RSV AMP PRB: CPT | Performed by: STUDENT IN AN ORGANIZED HEALTH CARE EDUCATION/TRAINING PROGRAM

## 2025-04-28 PROCEDURE — 250N000011 HC RX IP 250 OP 636: Performed by: STUDENT IN AN ORGANIZED HEALTH CARE EDUCATION/TRAINING PROGRAM

## 2025-04-28 PROCEDURE — 250N000009 HC RX 250: Performed by: STUDENT IN AN ORGANIZED HEALTH CARE EDUCATION/TRAINING PROGRAM

## 2025-04-28 RX ORDER — ECONAZOLE NITRATE 10 MG/G
CREAM TOPICAL DAILY
COMMUNITY

## 2025-04-28 RX ORDER — IOPAMIDOL 755 MG/ML
67 INJECTION, SOLUTION INTRAVASCULAR ONCE
Status: COMPLETED | OUTPATIENT
Start: 2025-04-28 | End: 2025-04-28

## 2025-04-28 RX ORDER — ATORVASTATIN CALCIUM 40 MG/1
40 TABLET, FILM COATED ORAL DAILY
Status: CANCELLED | OUTPATIENT
Start: 2025-04-29

## 2025-04-28 RX ORDER — UBIDECARENONE 100 MG
100 CAPSULE ORAL DAILY
COMMUNITY

## 2025-04-28 RX ORDER — GLIPIZIDE 5 MG/1
5 TABLET, FILM COATED, EXTENDED RELEASE ORAL DAILY
COMMUNITY

## 2025-04-28 RX ORDER — ONDANSETRON 4 MG/1
4 TABLET, ORALLY DISINTEGRATING ORAL EVERY 8 HOURS PRN
COMMUNITY

## 2025-04-28 RX ORDER — TRIAMCINOLONE ACETONIDE 0.25 MG/G
CREAM TOPICAL 2 TIMES DAILY PRN
COMMUNITY

## 2025-04-28 RX ORDER — MIRTAZAPINE 7.5 MG/1
3.75 TABLET, FILM COATED ORAL AT BEDTIME
COMMUNITY

## 2025-04-28 RX ORDER — ACETAMINOPHEN 500 MG
1000 TABLET ORAL ONCE
Status: DISCONTINUED | OUTPATIENT
Start: 2025-04-28 | End: 2025-04-28

## 2025-04-28 RX ADMIN — SODIUM CHLORIDE 100 ML: 9 INJECTION, SOLUTION INTRAVENOUS at 18:45

## 2025-04-28 RX ADMIN — IOPAMIDOL 67 ML: 755 INJECTION, SOLUTION INTRAVENOUS at 18:45

## 2025-04-28 ASSESSMENT — ACTIVITIES OF DAILY LIVING (ADL)
ADLS_ACUITY_SCORE: 46

## 2025-04-28 NOTE — ED PROVIDER NOTES
Emergency Department Note      History of Present Illness     Chief Complaint   One-sided Weakness      HPI   Ariana Barnes is a 85 year old female with PMH dementia here with son for evaluation of one-sided weakness.  History of stroke with left-sided weakness, expressive aphasia.  Since yesterday son noted significant weakness in the right hand and leaning to the right.  He her legs seem equal in strength.  Also notes that her speech pattern became significantly worse.  Does not talk much but her speech has become even more mumbled than it already is.  No falls or head injuries.  No cough or cold symptoms.  Is eating and drinking somewhat less, difficult to give medications to.  Due to concerns for another stroke, family gave double dose of her Plavix today.  Gave her normal baby aspirin.  She lives with her son and daughter who have no problem caring for her though do note some caregiver fatigue because she cannot express herself.    Independent Historian   Son as detailed above.    Review of External Notes   Geriatrician note 4/17/2025.  Mixed vascular dementia, weight loss.  Reporting some increasing dysarthria and muscle weakness.  Was on Brilinta, switched to aspirin.    Past Medical History     Medical History and Problem List   Past Medical History:   Diagnosis Date    Abdominal pain, chronic, right lower quadrant 12/22/2014    Dementia (H)     Depressive disorder     Diabetes mellitus (H)     Hypertension     Influenza 12/24/2014    Syncope 3/5/2020       Medications   acetaminophen (TYLENOL) 325 MG tablet  aspirin 81 MG tablet  atorvastatin (LIPITOR) 40 MG tablet  carvedilol (COREG) 25 MG tablet  co-enzyme Q-10 100 MG CAPS capsule  econazole nitrate 1 % external cream  ezetimibe (ZETIA) 10 MG tablet  glipiZIDE (GLUCOTROL XL) 5 MG 24 hr tablet  losartan (COZAAR) 100 MG tablet  mirtazapine (REMERON) 7.5 MG tablet  multivitamin, therapeutic with minerals (THERA-VIT-M) TABS  ondansetron (ZOFRAN ODT) 4 MG  ODT tab  OYSTER SHELL CALCIUM/D 500-200 MG-UNIT tablet  triamcinolone (KENALOG) 0.025 % cream  vitamin D3 (CHOLECALCIFEROL) 1.25 MG (22181 UT) capsule        Surgical History   No past surgical history on file.    Physical Exam     Patient Vitals for the past 24 hrs:   BP Temp Temp src Pulse Resp SpO2   04/28/25 1900 (!) 117/93 -- -- 65 -- 92 %   04/28/25 1800 (!) 151/82 -- -- 69 -- 96 %   04/28/25 1745 (!) 157/68 -- -- 70 -- 95 %   04/28/25 1730 (!) 155/81 -- -- 67 -- 95 %   04/28/25 1715 (!) 142/93 -- -- -- -- --   04/28/25 1605 (!) 162/70 98.6  F (37  C) Temporal 72 20 99 %     Physical Exam  General: Awake, alert, in no acute distress   HEENT: Atraumatic   EOM normal   External ears normal   Trachea midline  Neck: Supple, normal ROM  CV: Regular rate, regular rhythm   No lower extremity edema  2+ radial and DP pulses  PULM: Breath sounds normal bilaterally  No wheezes or rales  ABD: Soft, non-tender, non-distended  Normal bowel sounds   No rebound or guarding   MSK: No gross deformities  NEURO: Awake and alert, mumbling words and Haitian.  Will not follow commands.  Right hand held in a fist, minimal resistance against examiner or gravity.  No obvious lower extremity differences in strength.  Mild nasolabial fold flattening on the right.  Skin: Warm, dry and intact      Diagnostics     Lab Results   Labs Ordered and Resulted from Time of ED Arrival to Time of ED Departure   COMPREHENSIVE METABOLIC PANEL - Abnormal       Result Value    Sodium 137      Potassium 3.5      Carbon Dioxide (CO2) 28      Anion Gap 10      Urea Nitrogen 16.2      Creatinine 0.72      GFR Estimate 81      Calcium 9.3      Chloride 99      Glucose 184 (*)     Alkaline Phosphatase 92      AST 16      ALT 12      Protein Total 7.4      Albumin 3.9      Bilirubin Total 0.5     GLUCOSE BY METER - Abnormal    GLUCOSE BY METER POCT 122 (*)    TROPONIN T, HIGH SENSITIVITY - Normal    Troponin T, High Sensitivity 12     TROPONIN T, HIGH  SENSITIVITY - Normal    Troponin T, High Sensitivity 8     INFLUENZA A/B, RSV AND SARS-COV2 PCR - Normal    Influenza A PCR Negative      Influenza B PCR Negative      RSV PCR Negative      SARS CoV2 PCR Negative     CBC WITH PLATELETS AND DIFFERENTIAL    WBC Count 8.3      RBC Count 4.71      Hemoglobin 13.7      Hematocrit 42.5      MCV 90      MCH 29.1      MCHC 32.2      RDW 13.0      Platelet Count 276      % Neutrophils 59      % Lymphocytes 30      % Monocytes 5      % Eosinophils 5      % Basophils 1      % Immature Granulocytes 0      NRBCs per 100 WBC 0      Absolute Neutrophils 4.9      Absolute Lymphocytes 2.4      Absolute Monocytes 0.4      Absolute Eosinophils 0.4      Absolute Basophils 0.0      Absolute Immature Granulocytes 0.0      Absolute NRBCs 0.0         Imaging   CTA Head Neck with Contrast   Preliminary Result   IMPRESSION:    HEAD CT:   1.  No acute hemorrhage, mass, mass effect or abnormal extra-axial fluid collection.   2.  Encephalomalacia involving the left parietal lobe, likely reflecting a sequela of remote injury.   3.  Moderate to severe presumed chronic small vessel ischemic changes.   4.  Moderate ventriculomegaly, out of proportion to the patient's volume loss. This finding may reflect communicating/normal pressure hydrocephalus.      HEAD CTA:   1.  Short segment thromboembolic occlusion of the left proximal M2 posterior division middle cerebral artery measuring approximately 5 mm in AP dimensions. There is distal reconstitution of flow beyond the occlusion with severe short segment    narrowing/stenosis involving a distal left M2/proximal M3 posterior division middle cerebral artery.   2.  Diminished arborization involving the branching left distal MCA posterior division middle cerebral arteries.   3.  Moderate to severe multifocal narrowing involving the A2/A3 anterior cerebral arteries.    4.  Moderate to severe short segment narrowing involving the right proximal to mid P2  posterior cerebral artery.   5.  Moderate narrowing involving the distal right M2/proximal M3 posterior division middle cerebral artery.   6.  Additional findings, as above.      NECK CTA:   1.  No significant stenosis or dissection involving the bilateral carotid systems.   2.  Mild beaded irregularity involving the mid left ICA may reflect fibromuscular dysplasia.   3.  Moderate to severe short segment narrowing of the proximal to mid left subclavian artery due to predominantly noncalcific plaque.   4.  No significant stenosis or dissection involving the bilateral vertebral arteries.         Dr. Johnny Freedman discussed results with Dr. Sousa on 04/20/2025 at 7:37 PM CDT.         Head CT w/o contrast   Preliminary Result   IMPRESSION:    HEAD CT:   1.  No acute hemorrhage, mass, mass effect or abnormal extra-axial fluid collection.   2.  Encephalomalacia involving the left parietal lobe, likely reflecting a sequela of remote injury.   3.  Moderate to severe presumed chronic small vessel ischemic changes.   4.  Moderate ventriculomegaly, out of proportion to the patient's volume loss. This finding may reflect communicating/normal pressure hydrocephalus.      HEAD CTA:   1.  Short segment thromboembolic occlusion of the left proximal M2 posterior division middle cerebral artery measuring approximately 5 mm in AP dimensions. There is distal reconstitution of flow beyond the occlusion with severe short segment    narrowing/stenosis involving a distal left M2/proximal M3 posterior division middle cerebral artery.   2.  Diminished arborization involving the branching left distal MCA posterior division middle cerebral arteries.   3.  Moderate to severe multifocal narrowing involving the A2/A3 anterior cerebral arteries.    4.  Moderate to severe short segment narrowing involving the right proximal to mid P2 posterior cerebral artery.   5.  Moderate narrowing involving the distal right M2/proximal M3 posterior division  middle cerebral artery.   6.  Additional findings, as above.      NECK CTA:   1.  No significant stenosis or dissection involving the bilateral carotid systems.   2.  Mild beaded irregularity involving the mid left ICA may reflect fibromuscular dysplasia.   3.  Moderate to severe short segment narrowing of the proximal to mid left subclavian artery due to predominantly noncalcific plaque.   4.  No significant stenosis or dissection involving the bilateral vertebral arteries.         Dr. Johnny Freedman discussed results with Dr. Sousa on 04/20/2025 at 7:37 PM CDT.         MR Brain w/o & w Contrast    (Results Pending)   MRA Brain ([place] [facility] [place]) w/o Contrast    (Results Pending)       EKG   ECG results from 04/28/25   EKG 12-lead, tracing only     Value    Systolic Blood Pressure     Diastolic Blood Pressure     Ventricular Rate 74    Atrial Rate 74    RI Interval 194    QRS Duration 108        QTc 446    P Axis 56    R AXIS -56    T Axis 37    Interpretation ECG      Sinus rhythm  Right bundle branch block  Left anterior fascicular block  ** Bifascicular block **  Abnormal ECG  When compared with ECG of 10-Rell-2021 22:10,  (RBBB and left anterior fascicular block) is now Present           Independent Interpretation   None    ED Course      Medications Administered   Medications   iopamidol (ISOVUE-370) solution 67 mL (67 mLs Intravenous $Given 4/28/25 1845)   sodium chloride 0.9 % bag for CT scan flush (100 mLs Intravenous $Given 4/28/25 1845)       Procedures   Procedures     Discussion of Management   Admitting Hospitalist, Dr. Neff  Neurology, Dr. Guzmán    ED Course   ED Course as of 04/28/25 2107 Mon Apr 28, 2025   1938 Left prox m2.  Spoke with radiology   1951 recheck   2004 Spoke with Dr. Guzmán stroke neuro   2007 Spoke with Radhames Neff MD       Additional Documentation  None    Medical Decision Making / Diagnosis     CMS Diagnoses: None    MIPS       None    [facility]   Ariana Barnes is a 85  year old female here with significant neurologic deficits that are more than 24 hours old.  Chronic left-sided weakness and expressive aphasia after an old stroke.  Had been on Brilinta and aspirin.  In discussion with PCP 10 days ago due to concern for side effects (still unclear what they are) was discontinued on Brilinta and put back on Plavix.  Workup as above shows likely new large vessel occlusion in the left M2 which is accounting for her significant right sided weakness and worsening expressive aphasia by family report.  She does not understand any English.  Family preferring to translate for her.     I spoke with stroke neurology.  Agree we are outside the window for any available treatments including thrombectomy.  No additional anticoagulation tonight.  Recommend admitting for remainder of stroke workup, discussion on anticoagulation, therapies.  I spoke with the above hospitalist who kindly accepts.  Initially family was hesitant to have the patient admitted given that they care for her very well at home though they do understand she will likely have increased needs, is at a high risk of stroke over the next several days, are amenable to hospitalization.    Disposition   The patient was admitted to the hospital.     Diagnosis     ICD-10-CM    1. Right sided weakness  R53.1       2. Bifascicular block  I45.2       3. Acute CVA (cerebrovascular accident) (H)  I63.9              DO Lars Villeda Ellen, DO  04/28/25 2108

## 2025-04-28 NOTE — ED TRIAGE NOTES
Pt c/o R sided weakness starting yesterday at 1400, no fall or trauma, pt on plavix, hx CVA in 2021, pt lost ability to speak and walk during that time, notable L sided hand grasp weakness in triage, ABC intact.      Triage Assessment (Adult)       Row Name 04/28/25 6206          Triage Assessment    Airway WDL WDL        Respiratory WDL    Respiratory WDL WDL        Skin Circulation/Temperature WDL    Skin Circulation/Temperature WDL WDL        Cardiac WDL    Cardiac WDL WDL        Peripheral/Neurovascular WDL    Peripheral Neurovascular WDL WDL        Cognitive/Neuro/Behavioral WDL    Cognitive/Neuro/Behavioral WDL X  unable to speak

## 2025-04-29 ENCOUNTER — APPOINTMENT (OUTPATIENT)
Dept: CARDIOLOGY | Facility: CLINIC | Age: 85
DRG: 065 | End: 2025-04-29
Attending: HOSPITALIST
Payer: COMMERCIAL

## 2025-04-29 LAB
ANION GAP SERPL CALCULATED.3IONS-SCNC: 11 MMOL/L (ref 7–15)
ATRIAL RATE - MUSE: 74 BPM
BUN SERPL-MCNC: 10.7 MG/DL (ref 8–23)
CALCIUM SERPL-MCNC: 9.2 MG/DL (ref 8.8–10.4)
CHLORIDE SERPL-SCNC: 105 MMOL/L (ref 98–107)
CHOLEST SERPL-MCNC: 161 MG/DL
CREAT SERPL-MCNC: 0.6 MG/DL (ref 0.51–0.95)
DIASTOLIC BLOOD PRESSURE - MUSE: NORMAL MMHG
EGFRCR SERPLBLD CKD-EPI 2021: 87 ML/MIN/1.73M2
ERYTHROCYTE [DISTWIDTH] IN BLOOD BY AUTOMATED COUNT: 13.2 % (ref 10–15)
EST. AVERAGE GLUCOSE BLD GHB EST-MCNC: 137 MG/DL
GLUCOSE BLDC GLUCOMTR-MCNC: 133 MG/DL (ref 70–99)
GLUCOSE BLDC GLUCOMTR-MCNC: 143 MG/DL (ref 70–99)
GLUCOSE BLDC GLUCOMTR-MCNC: 145 MG/DL (ref 70–99)
GLUCOSE BLDC GLUCOMTR-MCNC: 84 MG/DL (ref 70–99)
GLUCOSE BLDC GLUCOMTR-MCNC: 84 MG/DL (ref 70–99)
GLUCOSE BLDC GLUCOMTR-MCNC: 85 MG/DL (ref 70–99)
GLUCOSE BLDC GLUCOMTR-MCNC: 87 MG/DL (ref 70–99)
GLUCOSE BLDC GLUCOMTR-MCNC: 94 MG/DL (ref 70–99)
GLUCOSE SERPL-MCNC: 89 MG/DL (ref 70–99)
HBA1C MFR BLD: 6.4 %
HCO3 SERPL-SCNC: 25 MMOL/L (ref 22–29)
HCT VFR BLD AUTO: 40.3 % (ref 35–47)
HDLC SERPL-MCNC: 28 MG/DL
HGB BLD-MCNC: 13.6 G/DL (ref 11.7–15.7)
INTERPRETATION ECG - MUSE: NORMAL
LDLC SERPL CALC-MCNC: 91 MG/DL
LVEF ECHO: NORMAL
MCH RBC QN AUTO: 29.4 PG (ref 26.5–33)
MCHC RBC AUTO-ENTMCNC: 33.7 G/DL (ref 31.5–36.5)
MCV RBC AUTO: 87 FL (ref 78–100)
NONHDLC SERPL-MCNC: 133 MG/DL
P AXIS - MUSE: 56 DEGREES
PLATELET # BLD AUTO: 268 10E3/UL (ref 150–450)
POTASSIUM SERPL-SCNC: 3.5 MMOL/L (ref 3.4–5.3)
PR INTERVAL - MUSE: 194 MS
QRS DURATION - MUSE: 108 MS
QT - MUSE: 402 MS
QTC - MUSE: 446 MS
R AXIS - MUSE: -56 DEGREES
RBC # BLD AUTO: 4.63 10E6/UL (ref 3.8–5.2)
SODIUM SERPL-SCNC: 141 MMOL/L (ref 135–145)
SYSTOLIC BLOOD PRESSURE - MUSE: NORMAL MMHG
T AXIS - MUSE: 37 DEGREES
TRIGL SERPL-MCNC: 211 MG/DL
VENTRICULAR RATE- MUSE: 74 BPM
WBC # BLD AUTO: 7.5 10E3/UL (ref 4–11)

## 2025-04-29 PROCEDURE — 99418 PROLNG IP/OBS E/M EA 15 MIN: CPT | Mod: FS

## 2025-04-29 PROCEDURE — 92611 MOTION FLUOROSCOPY/SWALLOW: CPT | Mod: GN | Performed by: SPEECH-LANGUAGE PATHOLOGIST

## 2025-04-29 PROCEDURE — 92526 ORAL FUNCTION THERAPY: CPT | Mod: GN | Performed by: SPEECH-LANGUAGE PATHOLOGIST

## 2025-04-29 PROCEDURE — 36415 COLL VENOUS BLD VENIPUNCTURE: CPT | Performed by: HOSPITALIST

## 2025-04-29 PROCEDURE — 999N000128 HC STATISTIC PERIPHERAL IV START W/O US GUIDANCE

## 2025-04-29 PROCEDURE — 250N000013 HC RX MED GY IP 250 OP 250 PS 637: Performed by: HOSPITALIST

## 2025-04-29 PROCEDURE — 120N000001 HC R&B MED SURG/OB

## 2025-04-29 PROCEDURE — 250N000013 HC RX MED GY IP 250 OP 250 PS 637

## 2025-04-29 PROCEDURE — 80048 BASIC METABOLIC PNL TOTAL CA: CPT | Performed by: HOSPITALIST

## 2025-04-29 PROCEDURE — 999N000111 HC STATISTIC OT IP EVAL DEFER

## 2025-04-29 PROCEDURE — 99221 1ST HOSP IP/OBS SF/LOW 40: CPT | Performed by: PHYSICIAN ASSISTANT

## 2025-04-29 PROCEDURE — 255N000002 HC RX 255 OP 636: Performed by: HOSPITALIST

## 2025-04-29 PROCEDURE — 99223 1ST HOSP IP/OBS HIGH 75: CPT | Mod: FS

## 2025-04-29 PROCEDURE — 99232 SBSQ HOSP IP/OBS MODERATE 35: CPT | Performed by: PHYSICIAN ASSISTANT

## 2025-04-29 PROCEDURE — 97112 NEUROMUSCULAR REEDUCATION: CPT | Mod: GP | Performed by: PHYSICAL THERAPIST

## 2025-04-29 PROCEDURE — 97161 PT EVAL LOW COMPLEX 20 MIN: CPT | Mod: GP | Performed by: PHYSICAL THERAPIST

## 2025-04-29 PROCEDURE — 999N000040 HC STATISTIC CONSULT NO CHARGE VASC ACCESS

## 2025-04-29 PROCEDURE — 250N000012 HC RX MED GY IP 250 OP 636 PS 637: Performed by: HOSPITALIST

## 2025-04-29 PROCEDURE — 93306 TTE W/DOPPLER COMPLETE: CPT | Mod: 26 | Performed by: INTERNAL MEDICINE

## 2025-04-29 PROCEDURE — 85014 HEMATOCRIT: CPT | Performed by: HOSPITALIST

## 2025-04-29 PROCEDURE — 999N000208 ECHOCARDIOGRAM COMPLETE

## 2025-04-29 RX ORDER — ASPIRIN 81 MG/1
81 TABLET, CHEWABLE ORAL DAILY
Status: DISCONTINUED | OUTPATIENT
Start: 2025-04-29 | End: 2025-04-29

## 2025-04-29 RX ORDER — NICOTINE POLACRILEX 4 MG
15-30 LOZENGE BUCCAL
Status: DISCONTINUED | OUTPATIENT
Start: 2025-04-29 | End: 2025-04-30 | Stop reason: HOSPADM

## 2025-04-29 RX ORDER — DEXTROSE MONOHYDRATE 25 G/50ML
25-50 INJECTION, SOLUTION INTRAVENOUS
Status: DISCONTINUED | OUTPATIENT
Start: 2025-04-29 | End: 2025-04-30 | Stop reason: HOSPADM

## 2025-04-29 RX ORDER — ONDANSETRON 2 MG/ML
4 INJECTION INTRAMUSCULAR; INTRAVENOUS EVERY 6 HOURS PRN
Status: DISCONTINUED | OUTPATIENT
Start: 2025-04-29 | End: 2025-04-30 | Stop reason: HOSPADM

## 2025-04-29 RX ORDER — LOSARTAN POTASSIUM 100 MG/1
100 TABLET ORAL DAILY
Status: DISCONTINUED | OUTPATIENT
Start: 2025-04-29 | End: 2025-04-30 | Stop reason: HOSPADM

## 2025-04-29 RX ORDER — HYDRALAZINE HYDROCHLORIDE 20 MG/ML
10 INJECTION INTRAMUSCULAR; INTRAVENOUS EVERY 30 MIN PRN
Status: DISCONTINUED | OUTPATIENT
Start: 2025-04-29 | End: 2025-04-30 | Stop reason: HOSPADM

## 2025-04-29 RX ORDER — EZETIMIBE 10 MG/1
10 TABLET ORAL DAILY
Status: DISCONTINUED | OUTPATIENT
Start: 2025-04-29 | End: 2025-04-30

## 2025-04-29 RX ORDER — ATORVASTATIN CALCIUM 80 MG/1
80 TABLET, FILM COATED ORAL DAILY
Status: DISCONTINUED | OUTPATIENT
Start: 2025-04-30 | End: 2025-04-30 | Stop reason: HOSPADM

## 2025-04-29 RX ORDER — ATORVASTATIN CALCIUM 40 MG/1
40 TABLET, FILM COATED ORAL DAILY
Status: DISCONTINUED | OUTPATIENT
Start: 2025-04-29 | End: 2025-04-29

## 2025-04-29 RX ORDER — LIDOCAINE 40 MG/G
CREAM TOPICAL
Status: DISCONTINUED | OUTPATIENT
Start: 2025-04-29 | End: 2025-04-30 | Stop reason: HOSPADM

## 2025-04-29 RX ORDER — LABETALOL HYDROCHLORIDE 5 MG/ML
10 INJECTION, SOLUTION INTRAVENOUS EVERY 10 MIN PRN
Status: DISCONTINUED | OUTPATIENT
Start: 2025-04-29 | End: 2025-04-30 | Stop reason: HOSPADM

## 2025-04-29 RX ORDER — ONDANSETRON 4 MG/1
4 TABLET, ORALLY DISINTEGRATING ORAL EVERY 6 HOURS PRN
Status: DISCONTINUED | OUTPATIENT
Start: 2025-04-29 | End: 2025-04-30 | Stop reason: HOSPADM

## 2025-04-29 RX ORDER — ASPIRIN 325 MG
325 TABLET ORAL DAILY
Status: DISCONTINUED | OUTPATIENT
Start: 2025-04-30 | End: 2025-04-30 | Stop reason: HOSPADM

## 2025-04-29 RX ORDER — CLOPIDOGREL BISULFATE 75 MG/1
300 TABLET ORAL ONCE
Status: COMPLETED | OUTPATIENT
Start: 2025-04-29 | End: 2025-04-29

## 2025-04-29 RX ORDER — GLIPIZIDE 5 MG/1
5 TABLET, FILM COATED, EXTENDED RELEASE ORAL DAILY
Status: DISCONTINUED | OUTPATIENT
Start: 2025-04-29 | End: 2025-04-30 | Stop reason: HOSPADM

## 2025-04-29 RX ORDER — CLOPIDOGREL BISULFATE 75 MG/1
75 TABLET ORAL DAILY
Status: DISCONTINUED | OUTPATIENT
Start: 2025-04-30 | End: 2025-04-30 | Stop reason: HOSPADM

## 2025-04-29 RX ORDER — CARVEDILOL 12.5 MG/1
12.5 TABLET ORAL 2 TIMES DAILY WITH MEALS
Status: DISCONTINUED | OUTPATIENT
Start: 2025-04-29 | End: 2025-04-30 | Stop reason: HOSPADM

## 2025-04-29 RX ADMIN — CLOPIDOGREL BISULFATE 300 MG: 75 TABLET, FILM COATED ORAL at 16:59

## 2025-04-29 RX ADMIN — INSULIN ASPART 1 UNITS: 100 INJECTION, SOLUTION INTRAVENOUS; SUBCUTANEOUS at 22:27

## 2025-04-29 RX ADMIN — ASPIRIN 81 MG CHEWABLE TABLET 81 MG: 81 TABLET CHEWABLE at 12:09

## 2025-04-29 RX ADMIN — MIRTAZAPINE 3.75 MG: 7.5 TABLET, FILM COATED ORAL at 22:27

## 2025-04-29 RX ADMIN — ATORVASTATIN CALCIUM 40 MG: 40 TABLET, FILM COATED ORAL at 12:09

## 2025-04-29 RX ADMIN — INSULIN ASPART 1 UNITS: 100 INJECTION, SOLUTION INTRAVENOUS; SUBCUTANEOUS at 12:20

## 2025-04-29 RX ADMIN — EZETIMIBE 10 MG: 10 TABLET ORAL at 12:10

## 2025-04-29 RX ADMIN — PERFLUTREN 10 ML: 6.52 INJECTION, SUSPENSION INTRAVENOUS at 12:15

## 2025-04-29 ASSESSMENT — ACTIVITIES OF DAILY LIVING (ADL)
ADLS_ACUITY_SCORE: 67
ADLS_ACUITY_SCORE: 48
ADLS_ACUITY_SCORE: 48
ADLS_ACUITY_SCORE: 57
ADLS_ACUITY_SCORE: 48
ADLS_ACUITY_SCORE: 57
ADLS_ACUITY_SCORE: 67
ADLS_ACUITY_SCORE: 48
ADLS_ACUITY_SCORE: 57
ADLS_ACUITY_SCORE: 53
ADLS_ACUITY_SCORE: 48
ADLS_ACUITY_SCORE: 57
ADLS_ACUITY_SCORE: 67
ADLS_ACUITY_SCORE: 69
DEPENDENT_IADLS:: CLEANING;COOKING;LAUNDRY;SHOPPING;MEAL PREPARATION;MEDICATION MANAGEMENT;MONEY MANAGEMENT;TRANSPORTATION
ADLS_ACUITY_SCORE: 57
ADLS_ACUITY_SCORE: 46
ADLS_ACUITY_SCORE: 67
ADLS_ACUITY_SCORE: 57
ADLS_ACUITY_SCORE: 57
ADLS_ACUITY_SCORE: 48
ADLS_ACUITY_SCORE: 67

## 2025-04-29 NOTE — PROGRESS NOTES
04/29/25 1108   Appointment Info   Signing Clinician's Name / Credentials (PT) Danii Nair, EDILSON       Present yes   Language Son present and interpreting.   Living Environment   People in Home child(leny), adult  (Son and daughter. The son is present today.)   Current Living Arrangements house   Home Accessibility wheelchair accessible   Transportation Anticipated family or friend will provide  (Son and daughter.)   Living Environment Comments Patient could walk very slowly to the bathroom with A of 1 personat times but 90% of the time they use the froy lift and w/c they have at home. Since this stroke her right leg is too weak and she is unable to stand with family per son. Son and daughter assist with all ADL's.   Self-Care   Usual Activity Tolerance fair   Current Activity Tolerance poor   Equipment Currently Used at Home wheelchair, manual;lift device;shower chair;commode chair   Fall history within last six months no   General Information   Onset of Illness/Injury or Date of Surgery 04/28/25   Referring Physician Radhames Neff MD   Patient/Family Therapy Goals Statement (PT) Goal is to take her home.   Pertinent History of Current Problem (include personal factors and/or comorbidities that impact the POC) Ariana Barnes is a 85 year old female with PMH significant for diabetes, hypertension, dyslipidemia, h/o CVA (with residual right sided weakness, total dependence), dementia who was brought to ED for worsening of her baseline expressive aphasia and worsening right-sided weakness, stroke workup noted with left M2 subocclusive thrombus, admitted inpatient 4/28/25.   Existing Precautions/Restrictions fall   Cognition   Cognitive Status Comments Per son, patient has been unable to communicate since her prior stroke. Reports she doesn't follow commands or respond verball.   Pain Assessment   Patient Currently in Pain No   Integumentary/Edema   Integumentary/Edema no deficits were  identifed   Posture    Posture Not impaired   Range of Motion (ROM)   Range of Motion ROM is WFL   Strength (Manual Muscle Testing)   Strength Comments Does have active movement right LE but unable to do full LAQ. Able to supprt herself on right LE. Did not spontaneous movement right UE as well. Left UE and LE strength functional.   Bed Mobility   Comment, (Bed Mobility) Max assist for sup to sit.   Transfers   Comment, (Transfers) Sit to stand with mod A of 2 provided by son and granddaughter.   Gait/Stairs (Locomotion)   Comment, (Gait/Stairs) Amb with bilateral HHA with assist of son on one side, granddaughter on the other and therapist behind assisting at hips.   Balance   Balance Comments Mod A needed to maintain sitting balance, max A for standing balance.   Sensory Examination   Sensory Perception Comments Difficult to assess due to expressive aphasia.   Muscle Tone   Muscle Tone Comments Increased extensor tone noted in right LE.   Clinical Impression   Criteria for Skilled Therapeutic Intervention Yes, treatment indicated   PT Diagnosis (PT) Impaired functional independence   Influenced by the following impairments Decreased balance, decreased right UE and LE strength, increased tone right LE.   Functional limitations due to impairments Needs assist for bed mobility, transfers and amb.   Clinical Presentation (PT Evaluation Complexity) stable   Clinical Presentation Rationale Clinical judgement   Clinical Decision Making (Complexity) moderate complexity   Planned Therapy Interventions (PT) balance training;bed mobility training;gait training;transfer training   Risk & Benefits of therapy have been explained evaluation/treatment results reviewed;care plan/treatment goals reviewed;risks/benefits reviewed;current/potential barriers reviewed;participants voiced agreement with care plan;participants included;patient;daughter   PT Total Evaluation Time   PT Eval, Low Complexity Minutes (64595) 15   Physical  Therapy Goals   PT Frequency 5x/week   PT Predicted Duration/Target Date for Goal Attainment 05/05/25   PT Goals Bed Mobility;Transfers;Gait   PT: Bed Mobility Moderate assist;Rolling;Supine to/from sit   PT: Transfers Moderate assist;Bed to/from chair;Sit to/from stand   PT: Gait Moderate assist;25 feet   Interventions   Interventions Quick Adds Neuromuscular Re-ed   Neuromuscular Re-education   Neuromuscular Re-Education Minutes (01334) 15   Symptoms Noted During/After Treatment fatigue   Treatment Detail/Skilled Intervention Patient leaning to the right in sitting. Practiced leaning forward but leans backwards and to the right immediately when support is decreased. Transferred bed to chair with mod A of 2 with step by step cues for advancing her feet. Unable to step backwards so had to bring the chair up behind her to sit.   PT Discharge Planning   PT Plan Sitting balance, Gait with HHA of 2 and a w/c follow. She has a froy lift at home but she was amb with assist of 1 short distances to and from bathroom prior to admission and family's goal is to get back to this.   PT Discharge Recommendation (DC Rec) home with outpatient physical therapy   PT Rationale for DC Rec Patients son whom she lives with and granddaughter present during session. Son reports their goal is to get patient back to amb short distances with assist of 1. He reports their house if very small so prefers to take patient to outpatient PT instead of HHPT.   PT Brief overview of current status Use of SS with nursing.   PT Total Distance Amb During Session (feet) 3   Physical Therapy Time and Intention   Timed Code Treatment Minutes 15   Total Session Time (sum of timed and untimed services) 30

## 2025-04-29 NOTE — PLAN OF CARE
Reason for Admission: L M2 MCA occlusion    Cognitive/Mentation: Alert but GWEN, mostly nonverbal, Tongan speaking at baseline  Neuros/CMS: R droop, R sided weakness, withdraws with RLE, generalized weakness, does not follow commands  VS: Stable on RA, SBP <220.   Tele: SB with BBB.  /GI: Incontinent. Last BM PTA. External catheter in place.   Pulmonary: LS clear.  Pain: no nonverbal signs.     Drains/Lines: PIV SL  Skin: intact  Activity: Assist x 2 with lift. Q2 turn/repo.  Diet: Pureed with slightly thick liquids. Takes pills whole with thickened liquid.    Therapies recs: home with assist  Discharge: possibly tomorrow 4/30    Aggression Stoplight Tool: green    End of shift summary: Family at bedside, helpful with interpretation and cares. Pt pocketing/spitting out medications if crushed, please ensure adequate swallow after administering meds.

## 2025-04-29 NOTE — H&P
[facility]  History and Physical   Hospitalist  Radhames Neff MD       Ariana Barnes MRN# [id number]   YOB: 1940 Age: 85 year old      Date of Admission:  4/28/2025         Assessment and Plan:   Ariana Barnes is a 85 year old female with PMH significant for diabetes, hypertension, dyslipidemia, h/o CVA (with residual right sided weakness, total dependence), dementia who was brought to ED for worsening of her baseline expressive aphasia and worsening right-sided weakness, stroke workup noted with left M2 subocclusive thrombus, admitted inpatient 4/28/25.    She had some baseline right-sided weakness and expressive aphasia from prior stroke. She used to be on Brilinta which was changed to aspirin during PCP visit on 4/17/25 due to adverse effects from Brilinta (family not elaborating further about side effects). She is total cares at baseline and dependent for all ADLs. On day prior to presentation (on 4/27) family noted that her facial droop and right-sided weakness was more pronounced. They brought her to hospital more than 30 hours after presentation.    Acute ischemic stroke due to thrombotic left M2 MCA occlusion  H/o CVA with baseline expressive aphasia and right-sided weakness  Hypertension  Dyslipidemia  -initial presentation as noted above  -CT head noted nothing acute, noted moderate to severe presumed chronic small vessel ischemic changes; also noted moderate ventriculomegaly, out of proportion to patients volume loss with concern for communicating /normal pressure hydrocephalus   -CTA head and neck noted left M2 sub-occlusive thrombus among other findings  -stroke neurology was contacted from ED and noted patient is not a candidate for thrombolysis/thrombectomy given the timing, location of clot and baseline health status  -MRI ordered from ED; MRI brain also ordered to reassess the status of left MCA M2 division occlusion once he undergoes her brain MRI  -will  continue with PTA aspirin  -stroke neurology to follow  -will allow permissive hypertension; hold off on PTA Coreg and losartan  -hydralazine and labetalol PRN  -will keep a straight NPO until SLP evaluation  -PT/OT evaluation  -continue PTA Lipitor, Zetia  -will order for an ECHO  -monitor on telemetry    Diabetes mellitus  -will hold off on PTA glipizide  -Q4 our blood sugar checks with NPO  -moderate resistance sliding scale insulin  -hypoglycemia protocol    Dementia/insomnia  -was recently started on Remeron, will continue    Clinically Significant Risk Factors Present on Admission                   # Drug Induced Platelet Defect: home medication list includes an antiplatelet medication       # Hypertension: Noted on problem list         # Dementia: noted on problem list                         DVT prophylaxis: mechanical with PCD boots  Code status: full code    Disposition:   Medically Ready for Discharge: Anticipated in 2-4 Days       Care plan discussed with ED provider, patient and her son present in room and also discussed with her daughter over the phone           Primary Care Physician:   Winnie Vilchis [phone]         Chief Complaint:     Right-sided weakness    History is limited from the patient due to her baseline nonverbal status and was discussed from family         History of Present Illness:     Ariana Barnes is a 85 year old female with PMH significant for diabetes, hypertension, dyslipidemia, h/o CVA (with residual right sided weakness, total dependence), dementia who was brought to ED for worsening of her baseline expressive aphasia and worsening right-sided weakness, stroke workup noted with left M2 subocclusive thrombus, admitted inpatient 4/28/25.    She had some baseline right-sided weakness and expressive aphasia from prior stroke. She used to be on Brilinta which was changed to aspirin during PCP visit on 4/17/25 due to adverse effects from Brilinta (family not elaborating further  about side effects). She is total cares at baseline and dependent for all ADLs. On day prior to presentation (on 4/27) family noted that her facial droop and right-sided weakness was more pronounced. They brought her to hospital more than 30 hours after presentation.    -CT head noted nothing acute, noted moderate to severe presumed chronic small vessel ischemic changes; also noted moderate ventriculomegaly, out of proportion to patients volume loss with concern for communicating /normal pressure hydrocephalus   -CTA head and neck noted left M2 sub-occlusive thrombus among other findings  -stroke neurology was contacted from ED and noted patient is not a candidate for thrombolysis/thrombectomy given the timing, location of clot and baseline health status  -MRI ordered from ED; MRI brain also ordered to reassess the status of left MCA M2 division occlusion once he undergoes her brain MRI  -will continue with PTA aspirin    Hospitalist was requested admission for further evaluation.             Past Medical History:     Diabetes  Hypertension  Dyslipidemia  h/o CVA (with residual right sided weakness, total dependence)  dementia           Past Surgical History:   No past surgical history on file.           Home Medications:     Prior to Admission Medications   Prescriptions Last Dose Informant Patient Reported? Taking?   OYSTER SHELL CALCIUM/D 500-200 MG-UNIT tablet 4/28/2025 Morning Daughter Yes Yes   Sig: Take 1 tablet by mouth daily.   acetaminophen (TYLENOL) 325 MG tablet Past Week Daughter Yes Yes   Sig: Take 325-650 mg by mouth every 6 hours as needed    aspirin 81 MG tablet 4/28/2025 Morning Daughter Yes Yes   Sig: Take 81 mg by mouth daily    atorvastatin (LIPITOR) 40 MG tablet 4/28/2025 Morning Daughter Yes Yes   Sig: Take 40 mg by mouth daily.   carvedilol (COREG) 25 MG tablet 4/28/2025 Morning Daughter Yes Yes   Sig: Take 12.5 mg by mouth 2 times daily (with meals)    co-enzyme Q-10 100 MG CAPS capsule  4/28/2025 Morning  Yes Yes   Sig: Take 100 mg by mouth daily.   econazole nitrate 1 % external cream 4/28/2025  Yes Yes   Sig: Apply topically daily. Apply to bilateral feet   ezetimibe (ZETIA) 10 MG tablet   No Yes   Sig: Take 1 tablet (10 mg) by mouth daily   glipiZIDE (GLUCOTROL XL) 5 MG 24 hr tablet 4/28/2025 Morning  Yes Yes   Sig: Take 5 mg by mouth daily.   losartan (COZAAR) 100 MG tablet 4/28/2025 Morning Daughter Yes Yes   Sig: Take 100 mg by mouth daily   mirtazapine (REMERON) 7.5 MG tablet 4/27/2025 Evening  Yes Yes   Sig: Take 3.75 mg by mouth at bedtime.   multivitamin, therapeutic with minerals (THERA-VIT-M) TABS 4/28/2025 Morning Daughter Yes Yes   Sig: Take 1 tablet by mouth daily   ondansetron (ZOFRAN ODT) 4 MG ODT tab   Yes Yes   Sig: Take 4 mg by mouth every 8 hours as needed for nausea.   triamcinolone (KENALOG) 0.025 % cream   Yes Yes   Sig: Apply topically 2 times daily as needed. To Bilateral feet   vitamin D3 (CHOLECALCIFEROL) 1.25 MG (82914 UT) capsule   Yes Yes   Sig: Take 50,000 Units by mouth every 7 days.      Facility-Administered Medications: None            Allergies:     Allergies   Allergen Reactions    Dicyclomine Unknown and Dizziness     Started at [facility] 10/17; d/c'd shortly after due to SEs    Hydrochlorothiazide Unknown     Stopped taking due to abdominal pain    Isosorbide Nitrate Other (See Comments)     Severe leg pain  Leg pain  Severe leg pain              Social History:   Ariana Barnes  reports that she has never smoked. She has never used smokeless tobacco. She reports that she does not drink alcohol and does not use drugs.              Family History:   Ariana Barnes family history includes Unknown/Adopted in her father and mother.    Family history was reviewed by myself and not pertinent to current presentation.           Review of Systems:   A10 point Review of Systems was done and were negative other than noted in the HPI.             Physical Exam:   Blood  pressure (!) 117/93, pulse 65, temperature 98.6  F (37  C), temperature source Temporal, resp. rate 20, SpO2 92%, not currently breastfeeding.  0 lbs 0 oz        Constitutional: Alert, awake ; baseline nonverbal status ;  lying comfortably in bed in no apparent distress   HEENT: noted right facial droop   Oral cavity: Moist mucosa   Cardiovascular: Normal s1 s2, regular rate and rhythm, no murmur   Lungs: B/l clear to auscultation, no wheezes or crepitations   Abdomen: Soft, nt, nd, no guarding, rigidity or rebound; BS +   LE : No edema   Musculoskeletal/ Neuro: Power 5/5 on left; right upper extremity 4/5, right lower extremity 3 /5       Psychiatry: normal mood and affect           Data:   All new lab and imaging data was reviewed in Epic.   Significant labs and imagings include:    Serial troponin unremarkable  normal CBC and CMP  EKG sinus rhythm, bifascicular block  CT head  CTA head and neck:  IMPRESSION:   HEAD CT:  1.  No acute hemorrhage, mass, mass effect or abnormal extra-axial fluid collection.  2.  Encephalomalacia involving the left parietal lobe, likely reflecting a sequela of remote injury.  3.  Moderate to severe presumed chronic small vessel ischemic changes.  4.  Moderate ventriculomegaly, out of proportion to the patient's volume loss. This finding may reflect communicating/normal pressure hydrocephalus.     HEAD CTA:  1.  Short segment thromboembolic occlusion of the left proximal M2 posterior division middle cerebral artery measuring approximately 5 mm in AP dimensions. There is distal reconstitution of flow beyond the occlusion with severe short segment   narrowing/stenosis involving a distal left M2/proximal M3 posterior division middle cerebral artery.  2.  Diminished arborization involving the branching left distal MCA posterior division middle cerebral arteries.  3.  Moderate to severe multifocal narrowing involving the A2/A3 anterior cerebral arteries.   4.  Moderate to severe short  segment narrowing involving the right proximal to mid P2 posterior cerebral artery.  5.  Moderate narrowing involving the distal right M2/proximal M3 posterior division middle cerebral artery.  6.  Additional findings, as above.     NECK CTA:  1.  No significant stenosis or dissection involving the bilateral carotid systems.  2.  Mild beaded irregularity involving the mid left ICA may reflect fibromuscular dysplasia.  3.  Moderate to severe short segment narrowing of the proximal to mid left subclavian artery due to predominantly noncalcific plaque.  4.  No significant stenosis or dissection involving the bilateral vertebral arteries.             Radhames Neff MD  Hospitalist

## 2025-04-29 NOTE — PHARMACY-ADMISSION MEDICATION HISTORY
Pharmacist Admission Medication History    Admission medication history is complete. The information provided in this note is only as accurate as the sources available at the time of the update.    Information Source(s): Family member and CareEverywhere/SureScripts via in-person    Pertinent Information:   -- Per son's report, patient was recently taken off of clopidogrel and switched to just aspirin 81 mg. She was not previously on Aspirin. Unclear reason for switching.  -- Remeron recently started  -- D3 recently prescribed - not yet started  -- Son was not 100% positive if she remains on Zetia - had an X next to it on [facility] list he had. It remains on [facility] list in Care Everywhere (prescsribed 4/17/25) and has been recently filled - left on list for now.    Changes made to PTA medication list:  Added:   Econazole  Cholecalciferol - not yet started  Mirtazapine  Ondansetron  Triamcinolone  Co-Q10  Deleted:   Amlodipine  Cequa solution  Donepezil  Ozempic  Daily D3  Ziprasidone  Changed: Atorvastatin 80 to 40 mg    Allergies reviewed with patient and updates made in EHR: no; Done by RN    Medication History Completed By: Tal Kline KAROL 4/28/2025 8:23 PM    PTA Med List   Medication Sig Note Last Dose/Taking   acetaminophen (TYLENOL) 325 MG tablet Take 325-650 mg by mouth every 6 hours as needed   Past Week   aspirin 81 MG tablet Take 81 mg by mouth daily   4/28/2025 Morning   atorvastatin (LIPITOR) 40 MG tablet Take 40 mg by mouth daily.  4/28/2025 Morning   carvedilol (COREG) 25 MG tablet Take 12.5 mg by mouth 2 times daily (with meals)   4/28/2025 Morning   co-enzyme Q-10 100 MG CAPS capsule Take 100 mg by mouth daily.  4/28/2025 Morning   econazole nitrate 1 % external cream Apply topically daily. Apply to bilateral feet  4/28/2025   ezetimibe (ZETIA) 10 MG tablet Take 1 tablet (10 mg) by mouth daily  Taking   glipiZIDE (GLUCOTROL XL) 5 MG 24 hr tablet Take 5 mg by mouth daily.  4/28/2025 Morning   losartan  (COZAAR) 100 MG tablet Take 100 mg by mouth daily  4/28/2025 Morning   mirtazapine (REMERON) 7.5 MG tablet Take 3.75 mg by mouth at bedtime.  4/27/2025 Evening   multivitamin, therapeutic with minerals (THERA-VIT-M) TABS Take 1 tablet by mouth daily  4/28/2025 Morning   ondansetron (ZOFRAN ODT) 4 MG ODT tab Take 4 mg by mouth every 8 hours as needed for nausea.  Taking As Needed   OYSTER SHELL CALCIUM/D 500-200 MG-UNIT tablet Take 1 tablet by mouth daily.  4/28/2025 Morning   triamcinolone (KENALOG) 0.025 % cream Apply topically 2 times daily as needed. To Bilateral feet  Taking As Needed   vitamin D3 (CHOLECALCIFEROL) 1.25 MG (46469 UT) capsule Take 50,000 Units by mouth every 7 days. 4/28/2025: Rx'd for 12 doses. Has not started this yet as of 4/28/25 Taking

## 2025-04-29 NOTE — PROGRESS NOTES
RECEIVING UNIT ED HANDOFF REVIEW    ED Nurse Handoff Report was reviewed by: Dionisio Navarro RN on April 28, 2025 at 11:21 PM

## 2025-04-29 NOTE — CONSULTS
[facility]    Stroke Consult Note    Reason for Consult:  Stroke    Chief Complaint: One-sided Weakness    HPI  Ariana Barnes is a 85 year old female with PMHx of strokes( acute infarct of right parietal lobe 2/2 ICAD with residual right sided weakness in 5/2021; acute infarct of the left parietal lobe 2/2 ESUS in 6/2021 with residual expressive aphasia), mixed vascular dementia, DM2, HTN, who presents with worsening of her baseline expressive aphasia and worsening right sided weaknesss. Symptom onset was 4/27 around 1400 when patient's son noted significant weakness in the right hand and patient leaning to the right. Per chart review, due to concerns for another stroke, family gave double dose of Plavix they had at home. PTA aspirin 81 mg, Lipitor 40 mg and Zetia 10 mg. Initial BP on admission was 162/70. MRI revealed multiple small acute/subacute infarcts in the posterior left MCA territory; also showed ventriculometry concerning for hydrocephalus. Vessel imaging revealed short segment thromboembolic occlusion of the left proximal M2 posterior division. Patient was not a candidate for thrombolysis/thrombectomy given the timing, location of clot, and baseline health status. Patient was admitted for further evaluation. NSGY consulted to comment on ventriculomegaly and recommend outpatient evaluation by Neurology.     Per chart review: Ariana was previously prescribed DAPT for 90 days and continued Plavix 75 mg indefinitely following stroke in 6/2021. On 11/2024, she was switched to Brilinta 90 mg due to poor compliance with Plavix (?unbalanced gait). On 4/17/25, Brilinta 90 mg was changed to Aspirin 81 mg during PCP visit due to adverse effects from Brilinta. Today, son and granddaughter are at bedside to provide additional history. Son states Ariana is usually hand fed at home and eats foods like porridge due to aphasia. He states Ariana requires assistance with all ADLs and her  "daughter is the PCA caregiver. Ariana ambulates with walker at baseline. Family reports discontinuing Zetia medication because\" it raised her blood pressure\". Son states they check kristen blood pressure regularly at home and SBP ranges around 140-150s.     Stroke Evaluation Summarized    MRI/Head CT MRI Brain: Multiple subcentimeter foci of acute/early subacute cerebral infarction are demonstrated scattered in the posterior left MCA territory without mass effect or evidence of associated hemorrhage.   - Moderate chronic infarct in the left parietal lobe.  advanced microvascular ischemic change     CT Head: No acute hemorrhage, mass, mass effect or abnormal extra-axial fluid collection. Encephalomalacia involving the left parietal lobe, likely reflecting a sequela of remote ischemic injury. Moderate ventriculomegaly, out of proportion to the patient's volume loss.    Intracranial Vasculature CTA Head: Short segment thromboembolic occlusion of the left proximal M2 posterior division middle cerebral artery. Diffuse ICAD    MRA Head: There is focal high-grade narrowing involving the proximal posterior left M2 division with diminished flow-related signal beyond this point.    Cervical Vasculature CTA Neck:  No significant stenosis or dissection involving the bilateral carotid systems. Mild beaded irregularity involving the mid left ICA may reflect fibromuscular dysplasia. No significant stenosis or dissection involving the bilateral vertebral arteries.      Echocardiogram TTE: 1. The left ventricle is normal in structure, function and size. The visual ejection fraction is estimated at 65%.   2. The right ventricle is normal in structure, function and size.  3. No valve disease. Normal atria size. No atrial shunt.   EKG/Telemetry Sinus, RBBB   Other Testing Not Applicable      LDL  4/28/2025: 91 mg/dL   A1C  4/28/2025: 6.4 %   Troponin 4/28/2025: 8 ng/L       Impression    Multiple subcentimeter foci of acute/early " "subacute infarcts involving posterior left MCA territory secondary to left proximal M2 occlusion. Etiology likely due to large artery atherosclerotic disease (ICAD). Recommend optimizing vascular risk factors (specifically HTN, HLD).     Severe expressive aphasia, now worsened from known baseline aphasia.     Recommendations   - Neuro checks and vital signs every 4 hours  - Continue permissive hypertension with systolic cap of 220 mmHg for initial 48 hours of symptom onset. Afterwards, SBP<180 while inpatient   - Long term outpatient goal BP is <130/80 to be achieved as outpatient within several weeks. Tighter control associated with improved vascular outcomes; recommend home blood pressure monitoring and keeping a BP log for primary care follow up  - Load with Plavix 300mg; continue Plavix 75 mg and aspirin 325mg daily together for 90 days; then aspirin 325mg alone indefinitely  - LDL 91 (goal 40-70); Increase Lipitor to 80 mg and continue Zetia 10 mg with ongoing outpatient titration to achieve goal  - Hgb A1c 6.4%, (goal <7%)   - Encourage Mediterranean diet and daily exercise  - Appreciate PT/OT/SLP consults  - Bedside Glucose Monitoring  - Euthermia, Euglycemia   - Education: Reviewed [facility] stroke warning signs    Diagnostic testing  - Continue telemetry while inpatient  - 14 day cardiac event monitor (Zio Patch) to be mailed to patient, (ordered)     DVT prophylaxis:  SCDs       Patient Follow-up    - in the next 1-2 week(s) with PCP  - in 6-8 weeks with general neurology or stroke MAXIMO ([phone]) (ordered)  - Outpatient general neurology to follow up with hydrocephalus (ordered)    Thank you for this consult. No further stroke evaluation is recommended, so we will sign off. Please contact us with any additional questions.    Lanette Mcmanus PA-C  Vascular Neurology    To page me or covering stroke neurology team member, click here: [facility]  Choose \"On Call\" tab at top, then select \"NEUROLOGY/ALL SITES\" from " "middle drop-down box, press Enter, then look for \"stroke\" or \"telestroke\" for your site.  _____________________________________________________    Clinically Significant Risk Factors Present on Admission                 # Drug Induced Platelet Defect: home medication list includes an antiplatelet medication   # Hypertension: Noted on problem list     # Dementia: noted on problem list                     Past Medical History    Past Medical History:   Diagnosis Date    Abdominal pain, chronic, right lower quadrant 12/22/2014    Dementia (H)     Depressive disorder     Diabetes mellitus (H)     Hypertension     Influenza 12/24/2014    Syncope 3/5/2020     Medications   Home Meds  Prior to Admission medications    Medication Sig Start Date End Date Taking? Authorizing Provider   acetaminophen (TYLENOL) 325 MG tablet Take 325-650 mg by mouth every 6 hours as needed    Yes Reported, Patient   aspirin 81 MG tablet Take 81 mg by mouth daily    Yes Reported, Patient   atorvastatin (LIPITOR) 40 MG tablet Take 40 mg by mouth daily.   Yes Reported, Patient   carvedilol (COREG) 25 MG tablet Take 12.5 mg by mouth 2 times daily (with meals)  3/12/21  Yes Reported, Patient   co-enzyme Q-10 100 MG CAPS capsule Take 100 mg by mouth daily.   Yes Unknown, Entered By History   econazole nitrate 1 % external cream Apply topically daily. Apply to bilateral feet   Yes Unknown, Entered By History   ezetimibe (ZETIA) 10 MG tablet Take 1 tablet (10 mg) by mouth daily 6/11/21  Yes Ho, Dayan Head MD   glipiZIDE (GLUCOTROL XL) 5 MG 24 hr tablet Take 5 mg by mouth daily.   Yes Unknown, Entered By History   losartan (COZAAR) 100 MG tablet Take 100 mg by mouth daily   Yes Unknown, Entered By History   mirtazapine (REMERON) 7.5 MG tablet Take 3.75 mg by mouth at bedtime.   Yes Unknown, Entered By History   multivitamin, therapeutic with minerals (THERA-VIT-M) TABS Take 1 tablet by mouth daily   Yes Reported, Patient   ondansetron (ZOFRAN " ODT) 4 MG ODT tab Take 4 mg by mouth every 8 hours as needed for nausea.   Yes Unknown, Entered By History   OYSTER SHELL CALCIUM/D 500-200 MG-UNIT tablet Take 1 tablet by mouth daily. 2/12/21  Yes Reported, Patient   triamcinolone (KENALOG) 0.025 % cream Apply topically 2 times daily as needed. To Bilateral feet   Yes Unknown, Entered By History   vitamin D3 (CHOLECALCIFEROL) 1.25 MG (20847 UT) capsule Take 50,000 Units by mouth every 7 days.   Yes Unknown, Entered By History       Scheduled Meds  Current Facility-Administered Medications   Medication Dose Route Frequency Provider Last Rate Last Admin    aspirin (ASA) chewable tablet 81 mg  81 mg Oral or Feeding Tube Daily Radhames Neff MD        atorvastatin (LIPITOR) tablet 40 mg  40 mg Oral or Feeding Tube Daily Radhames Neff MD        [Held by provider] carvedilol (COREG) tablet 12.5 mg  12.5 mg Oral or Feeding Tube BID w/meals Radhames Neff MD        ezetimibe (ZETIA) tablet 10 mg  10 mg Oral or Feeding Tube Daily Radhames Neff MD        [Held by provider] glipiZIDE (GLUCOTROL XL) 24 hr tablet 5 mg  5 mg Oral Daily Radhames Neff MD        insulin aspart (NovoLOG) injection (RAPID ACTING)  1-7 Units Subcutaneous Q4H Radhames Neff MD        [Held by provider] losartan (COZAAR) tablet 100 mg  100 mg Oral or Feeding Tube Daily Radhames Neff MD        mirtazapine (REMERON) half-tab 3.75 mg  3.75 mg Oral or Feeding Tube At Bedtime Radhames Neff MD        sodium chloride (PF) 0.9% PF flush 3 mL  3 mL Intracatheter Q8H [facility] Radhames Neff MD           Infusion Meds  Current Facility-Administered Medications   Medication Dose Route Frequency Provider Last Rate Last Admin       Allergies   Allergies   Allergen Reactions    Dicyclomine Unknown and Dizziness     Started at [facility] 10/17; d/c'd shortly after due to SEs    Hydrochlorothiazide Unknown     Stopped taking due to abdominal pain     Isosorbide Nitrate Other (See Comments)     Severe leg pain  Leg pain  Severe leg pain            PHYSICAL EXAMINATION   Temp:  [97  F (36.1  C)-98.6  F (37  C)] 97  F (36.1  C)  Pulse:  [58-79] 66  Resp:  [16-20] 17  BP: (117-181)/(68-99) 156/76  SpO2:  [92 %-99 %] 93 %    Neurologic  Mental Status:  alert, intermittently follows one step commands when repeated in Burkinan (i.e hold arm up, hold leg up; does not lift eyebrows or smile when asked), severe expressive aphasia, produces mumbled sounds and moans.    Cranial Nerves:  blinks to threat bilaterally, does not follow commands to perform EOM testing, however no fixed gaze noted on exam, right facial droop appreciated at rest, patient does not cooperate to perform facial muscle testing  Motor:  no abnormal movements, unable to hold right upper extremity antigravity independently, limb falls straight to bed when held by examiner; left upper extremity with no drift, able to hold antigravity for 10 seconds when repeated instructions, right lower extremity no effort against gravity, very minimal movement noted; left lower extremity falls straight to bed however more movement noted of limb when compared to RLE.   Sensory:  unable to test light touch sensation due to patient's mental status, withdraws to noxious in left lower extremity, grimaces to noxious of right lower extremity  Coordination:  unable to conduct ataxia testing due to patient's mental status, however no grossly uncoordinated movement noted  Station/Gait:  deferred    Stroke Scales    NIHSS  1a. Level of Consciousness 1-->Not alert, but arousable by minor stimulation to obey, answer, or respond   1b. LOC Questions 2-->Answers neither question correctly   1c. LOC Commands 2-->Performs neither task correctly   2.   Best Gaze 0-->Normal   3.   Visual 0-->No visual loss   4.   Facial Palsy 2-->Partial paralysis (total or near-total paralysis of lower face)   5a. Motor Arm, Left 0-->No drift, limb holds 90  (or 45) degrees for full 10 secs   5b. Motor Arm, Right 3-->No effort against gravity, limb falls   6a. Motor Leg, Left 1-->Drift, leg falls by the end of the 5-sec period but does not hit bed   6b. Motor Leg, right 3-->No effort against gravity, leg falls to bed immediately   7.   Limb Ataxia 0-->Absent   8.   Sensory 0-->Normal, no sensory loss   9.   Best Language 3-->Mute, global aphasia, no usable speech or auditory comprehension   10. Dysarthria 2-->Severe dysarthria, patients speech is so slurred as to be unintelligible in the absence of or out of proportion to any dysphasia, or is mute/anarthric   11. Extinction and Inattention  0-->No abnormality   Total 19 (04/29/25 1424)       Imaging  I personally reviewed all imaging; relevant findings per HPI.    Labs Data   CBC  Recent Labs   Lab 04/29/25  0835 04/28/25  1622   WBC 7.5 8.3   RBC 4.63 4.71   HGB 13.6 13.7   HCT 40.3 42.5    276     Basic Metabolic Panel   Recent Labs   Lab 04/29/25  0835 04/29/25  0744 04/29/25  0604 04/28/25  1858 04/28/25  1622     --   --   --  137   POTASSIUM 3.5  --   --   --  3.5   CHLORIDE 105  --   --   --  99   CO2 25  --   --   --  28   BUN 10.7  --   --   --  16.2   CR 0.60  --   --   --  0.72   GLC 89 84 84   < > 184*   ELLI 9.2  --   --   --  9.3    < > = values in this interval not displayed.     Liver Panel  Recent Labs   Lab 04/28/25  1622   PROTTOTAL 7.4   ALBUMIN 3.9   BILITOTAL 0.5   ALKPHOS 92   AST 16   ALT 12     INR    Recent Labs   Lab Test 06/10/21  2032 05/08/21  1319   INR 0.96 0.96           Stroke Consult Data Data   This was a non-emergent, non-telestroke consult.  I have personally spent a total of 60 minutes providing care today, time spent in reviewing medical records and devising the plan as recorded above.

## 2025-04-29 NOTE — PLAN OF CARE
Reason for Admission: CVA    Cognitive/Mentation: Nonverbal, [place] Verdean speaking at baseline  Neuros/CMS: R droop, minimal movement of RLE, withdraws to pain,otherwise mildly weak throughout, does not follow commands  VS: VSS on RA.   Tele: Sinus kristina with BBB.  /GI: Incontinent, female external catheter in place  Pulmonary: LS clear.  Pain: No nonverbal signs of pain.     Drains/Lines: VAD consult placed. IV infiltrated during shift. Patient sleeping after IV loss and son wanted patient to sleep.  Skin: Intact  Activity: Assist x 2 with lift.  Diet: NPO    Therapies recs: Pending  Discharge: Pending    Aggression Stoplight Tool: Green    End of shift summary: Patient vitally and neurologically stable through shift. Son stayed the night and helpful in cares.    Admission/Transfer from: ED  2 RN skin assessment completed. Yes  Significant findings include: Normal skin color  WOC Nurse Consult Ordered? No

## 2025-04-29 NOTE — PLAN OF CARE
OT: Order received, chart reviewed and discussed with care team. OT not indicated due to patient receives assist with all ADLs and transfers at baseline. Lives at home with family support 24/7. PT to follow mobility needs. Defer discharge recommendations to physical therapy and care team. Will complete orders.

## 2025-04-29 NOTE — CONSULTS
Care Management Initial Consult    General Information  Assessment completed with: Family, Dustin, michael  Type of CM/SW Visit: Initial Assessment    Primary Care Provider verified and updated as needed: Yes   Readmission within the last 30 days: no previous admission in last 30 days      Reason for Consult: discharge planning  Advance Care Planning: Advance Care Planning Reviewed: present on chart          Communication Assessment  Patient's communication style: spoken language (non-English)             Cognitive  Cognitive/Neuro/Behavioral: .WDL except  Level of Consciousness: alert, other (see comments) (difficult to assess)  Arousal Level: opens eyes spontaneously  Orientation: other (see comments) (GWEN, mostly nonverbal)  Mood/Behavior: calm  Best Language: 2 - Severe aphasia (mostly nonverbal)  Speech: unable to speak    Living Environment:   People in home: child(leny), adult  Anna, Dustin  Current living Arrangements: house      Able to return to prior arrangements: yes       Family/Social Support:  Care provided by: child(leny), homecare agency  Provides care for: no one, unable/limited ability to care for self  Marital Status:   Support system: Other (specify) (family)          Description of Support System: Supportive, Involved         Current Resources:   Patient receiving home care services: No        Community Resources: County Programs, County Worker, PCA  Equipment currently used at home: commode chair, hospital bed, lift device, wheelchair, manual  Supplies currently used at home:      Employment/Financial:  Employment Status:          Financial Concerns: none           Does the patient's insurance plan have a 3 day qualifying hospital stay waiver?  No    Lifestyle & Psychosocial Needs:  Social Drivers of Health     Food Insecurity: High Risk (4/29/2025)    Food Insecurity     Within the past 12 months, did you worry that your food would run out before you got money to buy more?: No     Within the past  12 months, did the food you bought just not last and you didn t have money to get more?: Yes   Depression: Not at risk (11/7/2024)    Received from [facility]    PHQ-2     PHQ-2 Subtotal: 0   Housing Stability: Low Risk  (4/29/2025)    Housing Stability     Do you have housing? : Yes     Are you worried about losing your housing?: No   Tobacco Use: Low Risk  (8/8/2024)    Received from [facility]    Patient History     Smoking Tobacco Use: Never     Smokeless Tobacco Use: Never     Passive Exposure: Not on file   Financial Resource Strain: Low Risk  (4/29/2025)    Financial Resource Strain     Within the past 12 months, have you or your family members you live with been unable to get utilities (heat, electricity) when it was really needed?: No   Alcohol Use: Not on file   Transportation Needs: Low Risk  (4/29/2025)    Transportation Needs     Within the past 12 months, has lack of transportation kept you from medical appointments, getting your medicines, non-medical meetings or appointments, work, or from getting things that you need?: No   Physical Activity: Not on file   Interpersonal Safety: Not on file   Stress: Not on file   Social Connections: Not on file   Health Literacy: Not on file       Functional Status:  Prior to admission patient needed assistance:   Dependent ADLs:: Ambulation-walker, Bathing, Dressing, Transfers, Wheelchair-with assist, Toileting  Dependent IADLs:: Cleaning, Cooking, Laundry, Shopping, Meal Preparation, Medication Management, Money Management, Transportation       Mental Health Status:  Mental Health Status: No Current Concerns       Chemical Dependency Status:  Chemical Dependency Status: No Current Concerns             Values/Beliefs:  Spiritual, Cultural Beliefs, Zoroastrian Practices, Values that affect care: yes               Discussed  Partnership in Safe Discharge Planning  document with patient/family: No    Additional Information:  CM consult for discharge  "planning.  Per chart review, patient \" is a 85 year old female with PMH significant for diabetes, hypertension, dyslipidemia, h/o CVA (with residual right sided weakness, total dependence), dementia who was brought to ED for worsening of her baseline expressive aphasia and worsening right-sided weakness, stroke workup noted with left M2 subocclusive thrombus.\"    Met with patient and her son Dustin.  Patient lives with Dustin and daughter Anna.  She has PCA services, about 7.5 hours daily.  She has 24/7 assist.  They have all the equipment needed to care for her.  She is dependent for all ADLs and mobility.  The family would like to take her to outpatient therapy.  The son is able to transport her.  He has no concerns at this time about discharge.      Next Steps: No further care management intervention anticipated at this time.  Please re-consult if further needs arise.  Care management signing off.      Tanisha Stone RN, BSN, PHN  Inpatient Care Coordination  [facility]  Phone: [phone]        "

## 2025-04-29 NOTE — CONSULTS
Neurosurgery Consult    HPI    Mrs. Barnes is a 85-year-old female who was brought to the emergency department for worsening of her baseline expressive aphasia and worsening right-sided weakness, subsequently left M2 occlusive thrombus on stroke workup was noted, she was admitted on 4/28/25.    Neurosurgery was consulted to evaluate ventricular size enlarged disproportionate to degree of volume loss, concerning for normal pressure hydrocephalus.  This finding has been seen going back several years at least 2021.    Patient is unable to provide any history, does not answer any questions.  Her family member is in the room and attempting to communicate with her in Nigerien, but she does not seem to be answering him either.  He is unable to understand any of the mumbling sounds that she is making.    Medical history  diabetes, hypertension, dyslipidemia, h/o CVA (with residual right sided weakness, total dependence), dementia    Social history  She is total cares at baseline and dependent for all ADLs      B/P: 181/78, T: 97, P: 58, R: 16       Exam    Patient alert, not following commands, spontaneous movement seen in all 4 extremities, but not moving extremities to command.    Extraocular movements intact  Right facial droop noted    Imaging    IMPRESSION:  HEAD MRI:  1.  Multiple subcentimeter foci of acute/early subacute cerebral infarction are demonstrated scattered in the posterior left middle cerebral artery territory without mass effect or evidence of associated hemorrhage.  2.  Moderate chronic infarct in the left parietal lobe.  3.  Underlying advanced presumed chronic small vessel ischemic changes.  4.  Ventriculomegaly which is disproportionate to the degree of volume loss elsewhere. In the correct clinical setting, the appearance would raise the possibility of a normal pressure/communicating hydrocephalus.     HEAD MRA:  1.  As demonstrated on the recent CTA, there is focal high-grade narrowing involving the  proximal posterior left M2 division with diminished flow-related signal beyond this point.  2.  Multifocal areas of irregularity and narrowing are demonstrated throughout the anterior circulation.    Assessment    Left M2 occlusive thrombus    Ventriculomegaly disproportionate to degree of volume loss    Plan:      No urgent neurosurgical intervention needed at this time.  Once patient has recovered from her stroke, she could certainly be worked up for normal pressure hydrocephalus as an outpatient with neurology, and consider high volume lumbar puncture to see if it improves any of her symptoms, if she were to see significant improvement and if her other symptoms clinically fit with normal pressure hydrocephalus, she could be referred to neurosurgery for evaluation for  shunt at that time.    No further evaluation or follow-up by neurosurgery at this time, neurosurgery will sign off.

## 2025-04-29 NOTE — ED NOTES
[facility]  ED Nurse Handoff Report    ED Chief complaint: One-sided Weakness      ED Diagnosis:   Final diagnoses:   Right sided weakness   Bifascicular block   Acute CVA (cerebrovascular accident) (H)       Code Status: see orders    Allergies:   Allergies   Allergen Reactions    Dicyclomine Unknown and Dizziness     Started at [facility] 10/17; d/c'd shortly after due to SEs    Hydrochlorothiazide Unknown     Stopped taking due to abdominal pain    Isosorbide Nitrate Other (See Comments)     Severe leg pain  Leg pain  Severe leg pain         Patient Story: Patient here with right sided weakness, on plavix, hx CVA in 2021, at baseline nonverbal and total cares.  Focused Assessment:  Pt nonverbal, right sided weakness, airway intact and non labored breathing.     Treatments and/or interventions provided: cardiac monitoring, BP , neuros  Patient's response to treatments and/or interventions:     To be done/followed up on inpatient unit:  follow POC    Does this patient have any cognitive concerns?:  nonverbal    Activity level - Baseline/Home:  Total Care  Activity Level - Current:   Total Care    Patient's Preferred language: Gibraltarian   Needed?: Yes    Isolation: None  Infection: Not Applicable  Patient tested for COVID 19 prior to admission: YES  Bariatric?: No    Vital Signs:   Vitals:    04/28/25 1959 04/28/25 2029 04/28/25 2059 04/28/25 2203   BP: (!) 166/99 (!) 168/90 (!) 180/94 (!) 141/97   Pulse: 71 71 79    Resp:       Temp:       TempSrc:       SpO2: 99% 99% 96%        Cardiac Rhythm:     Was the PSS-3 completed:   Yes  What interventions are required if any?               Family Comments: at bedside son  OBS brochure/video discussed/provided to patient/family: N/A                For the majority of the shift this patient's behavior was Green.   Behavioral interventions performed were .    ED NURSE PHONE NUMBER: [phone]

## 2025-04-29 NOTE — CONSULTS
"[facility]    Stroke Telephone Note    I was called by Felipa Smith on 04/28/25 regarding patient Ariana Barnes. The patient is a 85 year old female with PMHx of stroke, dementia, DMII, HTN, who presents with worsening of her baseline expressive aphasia and new right sided weaknesss.  Witnessed symptoms onset was 2pm yesterday (~30 hours prior).  CT/CTA reveals encephalomalacia and left M2 subocclusive thrombus. Family gave her a double dose of plavix and baby aspirin prior to presenting to the ED. At baseline she has significant expressive aphasia with minimal speech output and left hemiparesis. She is dependent for all ADLs, but can walk with assistance or using the wall in the house.    Vitals  BP: (!) 117/93   Pulse: 65   Resp: 20   Temp: 98.6  F (37  C)        Imaging Findings  As above    Impression  Acute ischemic stroke  Thrombus left M2 MCA division    Recommendations  Patient is not a candidate for thrombolysis/thrombectomy given the timing, location of clot, and baseline health status.   She received Aspirin/Plavix at home--following MRI can determine the optimal antithrombotic regimen based upon risk of hemorrhagic transformation  Stroke admission order set. Hold antihypertensives and keep head of bed 30 degrees or less as tolerated.  MRI may be obtained as an inpatient. Would obtain an MRA Brain to reassess the status of the left MCA M2 division occlusion when she undergoes her MRI Brain--I have ordered a routine MRA Brain.      My recommendations are based on the information provided over the phone by Ariana Barnes's in-person providers. They are not intended to replace the clinical judgment of her in-person providers. I was not requested to personally see or examine the patient at this time.     Esvin Guzmán MD, MS  Vascular Neurology    To page me or covering stroke neurology team member, click here: [facility]  Choose \"On Call\" tab at top, then select " "\"NEUROLOGY/ALL SITES\" from middle drop-down box, press Enter, then look for \"stroke\" or \"telestroke\" for your site.           "

## 2025-04-29 NOTE — PROGRESS NOTES
04/29/25 0908   Appointment Info   Signing Clinician's Name / Credentials (SLP) Maribel Chandler MS CCC SLP   General Information   Onset of Illness/Injury or Date of Surgery 04/28/25   Referring Physician Radhames Neff   Patient/Family Therapy Goal Statement (SLP) Son concerned that she has not had any food since yesterday. Patient not able to state.   Pertinent History of Current Problem Ariana Barnes is a 85 year old female with PMH significant for diabetes, hypertension, dyslipidemia, h/o CVA (with residual right sided weakness, total dependence), dementia who was brought to ED for worsening of her baseline expressive aphasia and worsening right-sided weakness, stroke workup noted with left M2 subocclusive thrombus, admitted inpatient 4/28/25.     She had some baseline right-sided weakness and expressive aphasia from prior stroke. She used to be on Brilinta which was changed to aspirin during PCP visit on 4/17/25 due to adverse effects from Brilinta (family not elaborating further about side effects). She is total cares at baseline and dependent for all ADLs. On day prior to presentation (on 4/27) family noted that her facial droop and right-sided weakness was more pronounced. They brought her to hospital more than 30 hours after presentation.   General Observations Alert with severe dysarthria       Present yes   Language Belgian   Type of Evaluation   Type of Evaluation Swallow Evaluation   Oral Motor   Oral Musculature unable to assess due to poor participation/comprehension   Mucosal Quality adequate   Dentition (Oral Motor)   Dentition (Oral Motor) natural dentition;adequate dentition   Facial Symmetry (Oral Motor)   Facial Symmetry (Oral Motor) right side impairment   Right Side Facial Asymmetry moderate impairment   Cough/Swallow/Gag Reflex (Oral Motor)   Volitional Throat Clear/Cough (Oral Motor) unable/difficult to assess   Volitional Swallow (Oral Motor) mildly delayed    General Swallowing Observations   Past History of Dysphagia Patient last seen 6/11/21 for a clinical swallow evaluation and at that time a dysphagia diet level 2 and thin liquids was rec.   Comment, Secretions/Suctioning Increased secretions.   Respiratory Support room air   Current Diet/Method of Nutritional Intake (General Swallowing Observations, NIS) NPO   Swallowing Evaluation Clinical swallow evaluation   Clinical Swallow Evaluation   Feeding Assistance dependent   Clinical Swallow Evaluation Textures Trialed thin liquids;slightly thick liquids;pureed   Clinical Swallow Eval: Thin Liquid Texture Trial   Mode of Presentation, Thin Liquids cup;self-fed   Volume of Liquid or Food Presented 2 sips of water   Oral Phase of Swallow delayed AP movement;premature pharyngeal entry   Oral Residue right anterior lateral sulci   Pharyngeal Phase of Swallow impaired;reduction in laryngeal movement   Diagnostic Statement Reduced bolus control and AP movement with premature entry suspected with delayed swallow response and increased WOB. Suspect penetration/silent aspiration.   Clinical Swallow Eval: Slightly Thick Liquids   Mode of Presentation cup;fed by clinician   Volume Presented 3 oz of apple juice/water   Oral Phase delayed AP movement;residue in oral cavity;premature pharyngeal entry   Oral Residue right anterior lateral sulci   Pharyngeal Phase impaired;reduction of laryngeal movement  (Delayed swallow)   Diagnostic Statement Reduced AP movement minimal oral residue and delayed swallow reduced WOB.   Clinical Swallow Evaluation: Puree Solid Texture Trial   Mode of Presentation, Puree spoon;fed by clinician   Volume of Puree Presented 6 1/2 teaspoons of pudding/apple sauce   Oral Phase, Puree delayed AP movement;effortful AP movement;residue in oral cavity;premature pharyngeal entry   Oral Residue, Puree right anterior lateral sulci   Pharyngeal Phase, Puree impaired;reduction in laryngeal movement  (Delayed  swallow response)   Diagnostic Statement Delayed AP movement, mild tongue thrusting and minimal/mild oral residue on right side.   Swallowing Recommendations   Diet Consistency Recommendations pureed (level 4);slightly thick liquids (level 1)   Supervision Level for Intake 1:1 supervision needed   Mode of Delivery Recommendations bolus size, small;food moistened;no straws;slow rate of intake;place food on left side of mouth   Postural Recommendations none   Swallowing Maneuver Recommendations alternate food and liquid intake   Monitoring/Assistance Required (Eating/Swallowing) check mouth frequently for oral residue/pocketing;stop eating activities when fatigue is present;monitor for cough or change in vocal quality with intake   Recommended Feeding/Eating Techniques (Swallow Eval) maintain upright sitting position for eating;maintain upright posture during/after eating for 30 minutes;minimize distractions during oral intake;moisten oral mucosa prior to intake;provide assist with feeding;set-up and prepare tray   Medication Administration Recommendations, Swallowing (SLP) Crushed   Instrumental Assessment Recommendations VFSS (videofluoroscopic swallowing study)   Comment, Swallowing Recommendations Pending tolerance maybe indicated.   General Therapy Interventions   Planned Therapy Interventions Dysphagia Treatment   Dysphagia treatment Modified diet education;Instruction of safe swallow strategies   Clinical Impression   Criteria for Skilled Therapeutic Interventions Met (SLP Eval) Yes, treatment indicated   SLP Diagnosis Moderate to severe oral and pharyngeal dysphagia   Risks & Benefits of therapy have been explained evaluation/treatment results reviewed;care plan/treatment goals reviewed;risks/benefits reviewed;current/potential barriers reviewed;participants voiced agreement with care plan;participants included;patient;son   Clinical Impression Comments Patient presents with moderate to severe oral and  pharyngeal dysphagia at bedside. Patient with moderate right sided impairement and facial droop. Severe dysarthia with attempts to speak. She demonstrated reduced bolus control and AP movement of the bolus with moderately delayed swallow response. Suspect silent aspiration of thin liquids given delay and WOB after the trial, no cough response. Improvement with slightly thick liquids and continued delay. She has reduced ability to propel the bolus posteriorly with minimal oral residue on the right side. This was cleared with a liquid rinse. Patient is at risk for silent aspiration given oral deficits and delayed swallow response.   Recommend: 1. Cautiously trial IDDSI level 4 puree with slightly thick liquids. 2. Upright, small bites/sips, verify each swallow, slow pace and alternate liquids/solids, check for oral residue. Hold diet if sx of aspiration present or changes in her respiratory status.   SLP Total Evaluation Time   Evaluation, videofluoroscopic eval of swallow function Minutes (64725) 20   SLP Goals   Therapy Frequency (SLP Eval) 5 times/week   SLP Predicted Duration/Target Date for Goal Attainment 05/09/25   SLP Goals Swallow   SLP: Safely tolerate diet without signs/symptoms of aspiration Pureed diet;Slightly thick liquids   Interventions   Interventions Quick Adds Swallowing Dysfunction   Swallowing Intervention   Treatment of Swallowing Dysfunction &/or Oral Function for Feeding Minutes (78808) 10   Symptoms Noted During/After Treatment Fatigue   Treatment Detail/Skilled Intervention Patient and son provided education on swallow dysfunction and rationale for a modiified diet and swallow strategies. Sonn was able to implement at bedside.   SLP Discharge Planning   SLP Plan Meal f/u for tolerance may need to consider video swallow.   SLP Discharge Recommendation home with home health   SLP Rationale for DC Rec Patient's swallow function is below her baseline.,   SLP Brief overview of current status   Monitor patient closely for early sx of aspiration on the puree diet and slightly thick liquids.   SLP Time and Intention   Total Session Time (sum of timed and untimed services) 30

## 2025-04-30 ENCOUNTER — PATIENT OUTREACH (OUTPATIENT)
Dept: CARE COORDINATION | Facility: CLINIC | Age: 85
End: 2025-04-30
Payer: COMMERCIAL

## 2025-04-30 ENCOUNTER — ORDERS ONLY (AUTO-RELEASED) (OUTPATIENT)
Dept: MEDSURG UNIT | Facility: CLINIC | Age: 85
End: 2025-04-30
Payer: COMMERCIAL

## 2025-04-30 VITALS
BODY MASS INDEX: 25.8 KG/M2 | SYSTOLIC BLOOD PRESSURE: 152 MMHG | HEART RATE: 64 BPM | HEIGHT: 60 IN | DIASTOLIC BLOOD PRESSURE: 80 MMHG | OXYGEN SATURATION: 95 % | WEIGHT: 131.39 LBS | RESPIRATION RATE: 20 BRPM | TEMPERATURE: 98.6 F

## 2025-04-30 DIAGNOSIS — I63.312 CEREBROVASCULAR ACCIDENT (CVA) DUE TO THROMBOSIS OF LEFT MIDDLE CEREBRAL ARTERY (H): ICD-10-CM

## 2025-04-30 LAB
GLUCOSE BLDC GLUCOMTR-MCNC: 108 MG/DL (ref 70–99)
GLUCOSE BLDC GLUCOMTR-MCNC: 109 MG/DL (ref 70–99)
GLUCOSE BLDC GLUCOMTR-MCNC: 120 MG/DL (ref 70–99)
GLUCOSE BLDC GLUCOMTR-MCNC: 131 MG/DL (ref 70–99)

## 2025-04-30 PROCEDURE — 250N000013 HC RX MED GY IP 250 OP 250 PS 637

## 2025-04-30 PROCEDURE — 92526 ORAL FUNCTION THERAPY: CPT | Mod: GN | Performed by: SPEECH-LANGUAGE PATHOLOGIST

## 2025-04-30 PROCEDURE — 99239 HOSP IP/OBS DSCHRG MGMT >30: CPT | Performed by: PHYSICIAN ASSISTANT

## 2025-04-30 PROCEDURE — 97530 THERAPEUTIC ACTIVITIES: CPT | Mod: GP

## 2025-04-30 RX ORDER — ATORVASTATIN CALCIUM 80 MG/1
80 TABLET, FILM COATED ORAL DAILY
Qty: 30 TABLET | Refills: 0 | Status: SHIPPED | OUTPATIENT
Start: 2025-04-30

## 2025-04-30 RX ORDER — ASPIRIN 325 MG
325 TABLET ORAL DAILY
Qty: 30 TABLET | Refills: 2 | Status: SHIPPED | OUTPATIENT
Start: 2025-04-30

## 2025-04-30 RX ORDER — CLOPIDOGREL BISULFATE 75 MG/1
75 TABLET ORAL DAILY
Qty: 89 TABLET | Refills: 0 | Status: SHIPPED | OUTPATIENT
Start: 2025-05-01

## 2025-04-30 RX ADMIN — ASPIRIN 325 MG ORAL TABLET 325 MG: 325 PILL ORAL at 11:52

## 2025-04-30 RX ADMIN — CLOPIDOGREL BISULFATE 75 MG: 75 TABLET, FILM COATED ORAL at 11:52

## 2025-04-30 RX ADMIN — ATORVASTATIN CALCIUM 80 MG: 80 TABLET, FILM COATED ORAL at 11:52

## 2025-04-30 ASSESSMENT — ACTIVITIES OF DAILY LIVING (ADL)
ADLS_ACUITY_SCORE: 69

## 2025-04-30 NOTE — PLAN OF CARE
Physical Therapy Discharge Summary    Reason for therapy discharge:    Discharged to home.    Progress towards therapy goal(s). See goals on Care Plan in [facility] electronic health record for goal details.  Goals met    Therapy recommendation(s):    Continue home exercise program.

## 2025-04-30 NOTE — DISCHARGE SUMMARY
[facility]  Hospitalist Discharge Summary      Date of Admission:  4/28/2025  Date of Discharge:  4/30/2025  Discharging Provider: Maribel Luna PA-C  Discharge Service: Hospitalist Service    Discharge Diagnoses   Acute ischemic stroke due to thrombotic left M2 MCA occlusion   Moderate to severe oropharyngeal dysphagia   Ventriculomegaly on MRI     Clinically Significant Risk Factors     # Overweight: Estimated body mass index is 25.66 kg/m  as calculated from the following:    Height as of this encounter: 1.524 m (5').    Weight as of this encounter: 59.6 kg (131 lb 6.3 oz).       Follow-ups Needed After Discharge   Follow-up Appointments       Hospital Follow-up with Existing Primary Care Provider (PCP)          Schedule Primary Care visit within: 7 Days               Discharge Disposition   Discharged to home with family and outpatient PT/OT/SLP  Condition at discharge: Stable    Hospital Course   Ariana Barnes is a 85 year old female with PMH significant for diabetes, hypertension, dyslipidemia, h/o CVA (with residual right sided weakness, total dependence), dementia who was brought to ED for worsening of her baseline expressive aphasia and worsening right-sided weakness, stroke workup noted with left M2 subocclusive thrombus, admitted inpatient 4/28/25.  For full HPI please see admission H&P from Dr. Radhames Neff dated 4/28/2025.     Acute ischemic stroke due to thrombotic left M2 MCA occlusion  H/o CVA with baseline expressive aphasia and right-sided weakness  Hypertension  Dyslipidemia  Hx baseline right-sided weakness and expressive aphasia from prior stroke. Previously on Brilinta which was recently changed to aspirin 4/17/25 due to adverse effects from Brilinta (family not elaborating further about side effects). Total cares at baseline and dependent for all ADLs. On 4/27 family noted that her facial droop and right-sided weakness was more pronounced. They brought her to  hospital more than 30 hours after presentation. Workup revealed acute CVA with left M2 sub-occlusive thrombus among other findings. Stroke neurology notified in the ED. Pt not a candidate for thrombolysis/thrombectomy given out of the window of timing, location of clot and baseline health status  *Brain MRI/MRA: Multiple subcentimeter foci of acute/early subacute cerebral infarction are demonstrated scattered in the posterior left middle cerebral artery territory without mass effect or evidence of associated hemorrhage. Focal high-grade narrowing involving the proximal posterior left M2 division with diminished flow-related signal beyond this point. Moderate chronic infarct in the left parietal lobe as well as advanced chronic small vessel ischemic changes.     She was admitted as inpatient status to the neurofloor and neurochecks were monitored.  Patient was noted to have left-sided facial droop and right sided worsening weakness, right upper extremity greater than right lower extremity.  She was seen by stroke neurology and loaded with Plavix 300 mg and instructed to continue Plavix 75 mg daily together with aspirin 325 mg for 90 days then aspirin 325 mg alone indefinitely.  Permissive hypertension was allowed and starting on 5/1 she may resume her carvedilol and pending blood pressures possibly her losartan for an outpatient blood pressure goal of less than 130/80. Tighter control associated with improved vascular outcomes. recommend home BP monitoring and keeping a BP log for primary care follow up, family has a blood pressure cuff and checks her blood pressure twice a day at home.  She was discharged with a 14-day cardiac event monitor (Zio Patch).  She will follow-up with primary care in the next 1 to 2 weeks and stroke neurology in 6 to 8 weeks she will also see Outpatient general neurology to follow up with hydrocephalus.     Evaluated by therapy who recommended home with family support and outpatient  PT/OT/SLP.  Continued on PT Lipitor which is increased from 40 mg to 80 mg.  Discontinued PTA Zetia per stroke neurology recommendations.  Day of discharge she is feeling well, tolerating dysphagia diet as below and all questions to the family was answered.  Neurologically stable with ongoing deficits as above.     Moderate to severe oropharyngeal dysphagia: SLP evaluated: Pureed diet, slightly thick liquids. Monitor patient closely for early sx of aspiration.      Ventriculomegaly on MRI  *CT/MRI also notes ventriculomegaly which is disproportionate to the degree of volume loss elsewhere. In the correct clinical setting, the appearance would raise the possibility of a normal pressure/communicating hydrocephalus   -Neurosurgery consulted              -No urgent neurosurgical intervention needed at this time.    -Once patient has recovered from her stroke, she could certainly be worked up for normal pressure hydrocephalus as an outpatient with neurology, and consider high volume lumbar puncture to see if it improves any of her symptoms, if she were to see significant improvement and if her other symptoms clinically fit with normal pressure hydrocephalus, she could be referred to neurosurgery for evaluation for  shunt at that time. Referral was placed for 8 weeks from discharge.     Non-Insulin dependent type 2 diabetes  PTA Regimen: glipizide  Last HbA1c 6.4% 4/28/25  Monitor with sliding scale with good glycemic control.              Recent Labs   Lab 04/30/25  0732 04/30/25  0440 04/30/25  0013 04/29/25  2129 04/29/25  1646 04/29/25  1158   * 108* 131* 145* 133* 143*         Dementia/insomnia: was recently started on Remeron, will continue       Consultations This Hospital Stay   NEUROLOGY IP STROKE CONSULT  SPEECH LANGUAGE PATH ADULT IP CONSULT  PHARMACY IP CONSULT  PHARMACY IP CONSULT  PHYSICAL THERAPY ADULT IP CONSULT  OCCUPATIONAL THERAPY ADULT IP CONSULT  REHAB ADMISSIONS LIAISON IP CONSULT  CARE  MANAGEMENT / SOCIAL WORK IP CONSULT  VASCULAR ACCESS ADULT IP CONSULT  NEUROSURGERY IP CONSULT  SMOKING CESSATION PROGRAM IP CONSULT    Code Status   Full Code    Time Spent on this Encounter   I, Maribel Luna PA-C, personally saw the patient today and spent greater than 30 minutes discharging this patient.       Maribel Luna PA-C  [facility] NEUROSCIENCE UNIT  [address] [id number]  Phone: [phone]  ______________________________________________________________________    Physical Exam   Vital Signs: Temp: 98.6  F (37  C) Temp src: Axillary BP: (!) 152/80 Pulse: 64   Resp: 20 SpO2: 95 % O2 Device: None (Room air)    Weight: 131 lbs 6.31 oz    General: Awake, alert, pleasant woman who appears stated age. Looks comfortable sitting up in chair. No acute distress.  HEENT: Normocephalic, atraumatic. Unable to complete EOMs, possibly comprehensive vs language barrier. Left facial droop.  Respiratory: Clear to auscultation bilaterally, no rales, wheezing, or rhonchi.  Cardiovascular: Regular rate and rhythm, +S1 and S2, no murmur auscultated.  Gastrointestinal: Soft, non-tender, non-distended. Bowel sounds present.  Skin: Warm, dry. No obvious rashes or lesions on exposed skin.   Musculoskeletal: No joint swelling, erythema or tenderness. Moves all extremities equally.  Neurologic: AAO difficult to discern. Unable to follow many directions, some issue due to language barrier. Unable to perform drift. Lifts LUE off pillow, unable to lift RUE, slight  to RUE, full strength to LUE . RLE weaker than LLE. Limited exam.  Psychiatric: Appropriate mood and affect. No obvious anxiety or depression.       Primary Care Physician   Winnie Vilchis    Discharge Orders      Adult Neurology  Referral      Physical Therapy  Referral      Occupational Therapy  Referral      Speech Therapy  Referral      Neurosurgery Referral      Reason for your  hospital stay    You were admitted with a new stroke. You saw stroke neurology. Your medications were adjusted.     Activity    Your activity upon discharge: activity as tolerated     Monitor and record    Blood pressure: daily and bring record to next appointment.  Restart carvedilol and hold losartan for now.  Check blood pressure and if it greater than 130/80, you may also restart the losartan.     Suction Machine Order    Suction Documentation  I attest that I have seen and evaluated Ariana Barnes. 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 Face to face addressing dysphagia and inability to clear/mobilize secretions.     I, the undersigned, certify that the above prescribed supplies are medically necessary for this patient and is both reasonable and necessary in reference to accepted standards of medical and necessary in reference to accepted standards of medical practice in the treatment of this patient's condition and is not prescribed as a convenience.     Diet    Follow this diet upon discharge: Current Diet:Orders Placed This Encounter      Combination Diet Pureed Diet (level 4); Slightly Thick (level 1) (Upright, no straws, slow pace, verify each swallow and alternate liquids/solids.)     Stroke Hospital Follow Up (for neurologist use only)    [facility] [employer] will call you to coordinate care as prescribed by your provider. If you don t hear from a representative within 2 business days, please call [phone].       Hospital Follow-up with Existing Primary Care Provider (PCP)          Stroke Hospital Follow Up    Please be aware that coverage of these services is subject to the terms and limitations of your health insurance plan.  Call member services at your health plan with any benefit or coverage questions.  [facility] [employer] will call you to coordinate care as prescribed by your provider. If you don t hear from a representative within  2 business days, please call [phone].       [facility] PATCH MAIL OUT       Significant Results and Procedures   Results for orders placed or performed during the hospital encounter of 04/28/25   Head CT w/o contrast    Narrative    EXAM: CT HEAD W/O CONTRAST, CTA HEAD NECK W CONTRAST  LOCATION: [facility]  DATE: 4/28/2025    INDICATION: AMS, right-sided weakness, new. confusion.  COMPARISON: CT head dated 06/10/2021  CONTRAST: 67 mL Isovue 370  TECHNIQUE: Head and neck CT angiogram with IV contrast. Noncontrast head CT followed by axial helical CT images of the head and neck vessels obtained during the arterial phase of intravenous contrast administration. Axial 2D reconstructed images and   multiplanar 3D MIP reconstructed images of the head and neck vessels were performed by the technologist. Dose reduction techniques were used. All stenosis measurements made according to NASCET criteria unless otherwise specified.    FINDINGS:   NONCONTRAST HEAD CT:   INTRACRANIAL CONTENTS: No acute hemorrhage, abnormal extra-axial fluid collection or mass/mass effect. No definite acute large territory infarction. There is encephalomalacia identified involving the left parietal lobe, likely reflecting a sequela of   remote ischemic injury. Moderate to severe presumed chronic small vessel ischemic changes. Moderate ventriculomegaly involving the lateral ventricles and third ventricle, out of proportion to the patient's volume loss. This finding may reflect   communicating/normal pressure hydrocephalus.     VISUALIZED ORBITS/SINUSES/MASTOIDS: No intraorbital abnormality. No paranasal sinus mucosal disease. No middle ear or mastoid effusion.    BONES/SOFT TISSUES: No acute abnormality.    HEAD CTA:  ANTERIOR CIRCULATION: No aneurysm or high flow vascular malformation. There is moderate narrowing involving the distal right M2/proximal M3 posterior division middle cerebral artery. Dominant left A1 anterior  cerebral artery. There is severe short   segment narrowing involving the right mid A2 anterior cerebral artery. Additionally there are multifocal additional regions of moderate to severe multifocal narrowing involving the right A3 anterior cerebral artery. There is severe short segment   narrowing of the mid to distal left A3 anterior cerebral artery.     There is a short segment thromboembolic occlusion of the left proximal M2 posterior division middle cerebral artery measuring approximately 5 mm in AP dimensions. There is distal reconstitution of flow beyond the occlusion with severe short segment   narrowing/stenosis involving a distal left M2/proximal M3 posterior division middle cerebral artery (image 144 through 137 series 5). Additionally, there is diminished arborization of the posterior division left middle cerebral artery branching   vasculature, predominantly centered in the region of the left parietal lobe    There is mild narrowing involving a right mid M2 ascending division middle cerebral artery. Standard [place] anatomy.    POSTERIOR CIRCULATION: There is moderate to severe short segment narrowing involving the right proximal to mid P2 posterior cerebral artery. Mild narrowing involving the left mid P2 posterior cerebral artery. Additionally there is mild narrowing   involving the proximal left P3 posterior cerebral artery. No proximal large vessel occlusion, aneurysm or high flow vascular malformation. Dominant right and smaller left vertebral artery contribute to a normal basilar artery.     DURAL VENOUS SINUSES: Expected enhancement of the major dural venous sinuses.    NECK CTA:  Patient motion degrades evaluation.    RIGHT CAROTID: No significant stenosis involving the right common carotid artery. No significant stenosis involving the carotid bifurcation and proximal right ICA. The mid to distal right ICA is obscured due to patient motion.    LEFT CAROTID: No significant stenosis  involving the left common carotid artery, left carotid bifurcation and left proximal ICA. Patient motion degrades evaluation of the mid left ICA, however there is no significant stenosis or dissection. Mild beaded   irregularity is noted involving the mid left ICA.    VERTEBRAL ARTERIES: No focal stenosis or dissection. Dominant right and smaller left vertebral arteries.    AORTIC ARCH: Three-vessel aortic arch anatomy. Calcific and noncalcific plaque noted along the aortic arch. No high-grade stenosis involving the origins of the great vessels. There is mild narrowing of the origin of the left subclavian artery.   Additionally there is moderate to severe short segment narrowing of the proximal to mid left subclavian artery due to predominantly noncalcific plaque.    NONVASCULAR STRUCTURES: Unremarkable.      Impression    IMPRESSION:   HEAD CT:  1.  No acute hemorrhage, mass, mass effect or abnormal extra-axial fluid collection.  2.  Encephalomalacia involving the left parietal lobe, likely reflecting a sequela of remote ischemic injury.  3.  Moderate to severe presumed chronic small vessel ischemic changes.  4.  Moderate ventriculomegaly, out of proportion to the patient's volume loss. This finding may reflect communicating/normal pressure hydrocephalus.    HEAD CTA:  1.  Short segment thromboembolic occlusion of the left proximal M2 posterior division middle cerebral artery measuring approximately 5 mm in AP dimensions. There is distal reconstitution of flow beyond the occlusion with severe short segment   narrowing/stenosis involving a distal left M2/proximal M3 posterior division middle cerebral artery.  2.  Diminished arborization involving the branching left distal MCA posterior division middle cerebral arteries.  3.  Moderate to severe multifocal narrowing involving the A2/A3 anterior cerebral arteries.   4.  Moderate to severe short segment narrowing involving the right proximal to mid P2 posterior cerebral  artery.  5.  Moderate narrowing involving the distal right M2/proximal M3 posterior division middle cerebral artery.  6.  Additional findings, as above.    NECK CTA:  1.  No significant stenosis or dissection involving the bilateral carotid systems.  2.  Mild beaded irregularity involving the mid left ICA may reflect fibromuscular dysplasia.  3.  Moderate to severe short segment narrowing of the proximal to mid left subclavian artery due to predominantly noncalcific plaque.  4.  No significant stenosis or dissection involving the bilateral vertebral arteries.      Dr. Johnny Freedman discussed results with Dr. Sousa on 04/20/2025 at 7:37 PM CDT.     CTA Head Neck with Contrast    Narrative    EXAM: CT HEAD W/O CONTRAST, CTA HEAD NECK W CONTRAST  LOCATION: [facility]  DATE: 4/28/2025    INDICATION: AMS, right-sided weakness, new. confusion.  COMPARISON: CT head dated 06/10/2021  CONTRAST: 67 mL Isovue 370  TECHNIQUE: Head and neck CT angiogram with IV contrast. Noncontrast head CT followed by axial helical CT images of the head and neck vessels obtained during the arterial phase of intravenous contrast administration. Axial 2D reconstructed images and   multiplanar 3D MIP reconstructed images of the head and neck vessels were performed by the technologist. Dose reduction techniques were used. All stenosis measurements made according to NASCET criteria unless otherwise specified.    FINDINGS:   NONCONTRAST HEAD CT:   INTRACRANIAL CONTENTS: No acute hemorrhage, abnormal extra-axial fluid collection or mass/mass effect. No definite acute large territory infarction. There is encephalomalacia identified involving the left parietal lobe, likely reflecting a sequela of   remote ischemic injury. Moderate to severe presumed chronic small vessel ischemic changes. Moderate ventriculomegaly involving the lateral ventricles and third ventricle, out of proportion to the patient's volume loss. This  finding may reflect   communicating/normal pressure hydrocephalus.     VISUALIZED ORBITS/SINUSES/MASTOIDS: No intraorbital abnormality. No paranasal sinus mucosal disease. No middle ear or mastoid effusion.    BONES/SOFT TISSUES: No acute abnormality.    HEAD CTA:  ANTERIOR CIRCULATION: No aneurysm or high flow vascular malformation. There is moderate narrowing involving the distal right M2/proximal M3 posterior division middle cerebral artery. Dominant left A1 anterior cerebral artery. There is severe short   segment narrowing involving the right mid A2 anterior cerebral artery. Additionally there are multifocal additional regions of moderate to severe multifocal narrowing involving the right A3 anterior cerebral artery. There is severe short segment   narrowing of the mid to distal left A3 anterior cerebral artery.     There is a short segment thromboembolic occlusion of the left proximal M2 posterior division middle cerebral artery measuring approximately 5 mm in AP dimensions. There is distal reconstitution of flow beyond the occlusion with severe short segment   narrowing/stenosis involving a distal left M2/proximal M3 posterior division middle cerebral artery (image 144 through 137 series 5). Additionally, there is diminished arborization of the posterior division left middle cerebral artery branching   vasculature, predominantly centered in the region of the left parietal lobe    There is mild narrowing involving a right mid M2 ascending division middle cerebral artery. Standard [place] anatomy.    POSTERIOR CIRCULATION: There is moderate to severe short segment narrowing involving the right proximal to mid P2 posterior cerebral artery. Mild narrowing involving the left mid P2 posterior cerebral artery. Additionally there is mild narrowing   involving the proximal left P3 posterior cerebral artery. No proximal large vessel occlusion, aneurysm or high flow vascular malformation. Dominant right and  smaller left vertebral artery contribute to a normal basilar artery.     DURAL VENOUS SINUSES: Expected enhancement of the major dural venous sinuses.    NECK CTA:  Patient motion degrades evaluation.    RIGHT CAROTID: No significant stenosis involving the right common carotid artery. No significant stenosis involving the carotid bifurcation and proximal right ICA. The mid to distal right ICA is obscured due to patient motion.    LEFT CAROTID: No significant stenosis involving the left common carotid artery, left carotid bifurcation and left proximal ICA. Patient motion degrades evaluation of the mid left ICA, however there is no significant stenosis or dissection. Mild beaded   irregularity is noted involving the mid left ICA.    VERTEBRAL ARTERIES: No focal stenosis or dissection. Dominant right and smaller left vertebral arteries.    AORTIC ARCH: Three-vessel aortic arch anatomy. Calcific and noncalcific plaque noted along the aortic arch. No high-grade stenosis involving the origins of the great vessels. There is mild narrowing of the origin of the left subclavian artery.   Additionally there is moderate to severe short segment narrowing of the proximal to mid left subclavian artery due to predominantly noncalcific plaque.    NONVASCULAR STRUCTURES: Unremarkable.      Impression    IMPRESSION:   HEAD CT:  1.  No acute hemorrhage, mass, mass effect or abnormal extra-axial fluid collection.  2.  Encephalomalacia involving the left parietal lobe, likely reflecting a sequela of remote ischemic injury.  3.  Moderate to severe presumed chronic small vessel ischemic changes.  4.  Moderate ventriculomegaly, out of proportion to the patient's volume loss. This finding may reflect communicating/normal pressure hydrocephalus.    HEAD CTA:  1.  Short segment thromboembolic occlusion of the left proximal M2 posterior division middle cerebral artery measuring approximately 5 mm in AP dimensions. There is distal reconstitution  of flow beyond the occlusion with severe short segment   narrowing/stenosis involving a distal left M2/proximal M3 posterior division middle cerebral artery.  2.  Diminished arborization involving the branching left distal MCA posterior division middle cerebral arteries.  3.  Moderate to severe multifocal narrowing involving the A2/A3 anterior cerebral arteries.   4.  Moderate to severe short segment narrowing involving the right proximal to mid P2 posterior cerebral artery.  5.  Moderate narrowing involving the distal right M2/proximal M3 posterior division middle cerebral artery.  6.  Additional findings, as above.    NECK CTA:  1.  No significant stenosis or dissection involving the bilateral carotid systems.  2.  Mild beaded irregularity involving the mid left ICA may reflect fibromuscular dysplasia.  3.  Moderate to severe short segment narrowing of the proximal to mid left subclavian artery due to predominantly noncalcific plaque.  4.  No significant stenosis or dissection involving the bilateral vertebral arteries.      Dr. Johnny Freedman discussed results with Dr. Sousa on 04/20/2025 at 7:37 PM CDT.     MRA Brain ([place]) w/o Contrast    Narrative    EXAM: MR BRAIN W/O CONTRAST, MRA BRAIN ([place]) W/O CONTRAST  LOCATION: [facility]  DATE: 04/28/2025    INDICATION: Possible new M2 thrombus, new right-sided weakness.  COMPARISON: CT/CTA same day. MRI brain 06/11/2021.  TECHNIQUE:   1) Routine multiplanar multisequence head MRI without intravenous contrast.  2) 3D time-of-flight head MRA without intravenous contrast.    FINDINGS:    HEAD MRI:  INTRACRANIAL CONTENTS: Multiple subcentimeter foci of restricted diffusion are demonstrated scattered in the posterior left middle cerebral artery territory with involvement in the periventricular white matter along the occipital horn, periatrial region,   as well as along the inferior left insular cortex, posterior basal  ganglia, and left frontoparietal junction. No appreciable mass effect or evidence of associated hemorrhage. Moderate chronic infarct demonstrated in the left parietal lobe. No mass,   acute hemorrhage, or extra-axial fluid collections. Confluent nonspecific T2/FLAIR hyperintensities within the cerebral white matter most consistent with advanced microvascular ischemic change. Ventriculomegaly disproportionate to the degree of cerebral   volume loss. Correlate for normal pressure hydrocephalus. Normal position of the cerebellar tonsils.     SELLA: Empty sella morphology with flattening of the pituitary gland along the sellar floor.    OSSEOUS STRUCTURES/SOFT TISSUES: Normal marrow signal. The major intracranial vascular flow voids are maintained.     ORBITS: No abnormality accounting for technique.     SINUSES/MASTOIDS: No paranasal sinus mucosal disease. No middle ear or mastoid effusion.       HEAD MRA:   ANTERIOR CIRCULATION: As demonstrated on the recent CTA, focal high-grade narrowing involving the proximal posterior left M2 division with diminished flow-related signal beyond this point. Multiple additional areas of non-flow-limiting irregularity and   narrowing are demonstrated throughout the anterior circulation. Within the limits of MRA, no convincing aneurysm or high-flow vascular malformation. Developmentally hypoplastic right A1 anterior cerebral artery segment.    POSTERIOR CIRCULATION: No large vessel occlusion or flow-limiting stenosis. Multifocal areas of irregularity and narrowing are demonstrated. Balanced vertebral arteries supply a normal basilar artery.         Impression    IMPRESSION:  HEAD MRI:  1.  Multiple subcentimeter foci of acute/early subacute cerebral infarction are demonstrated scattered in the posterior left middle cerebral artery territory without mass effect or evidence of associated hemorrhage.  2.  Moderate chronic infarct in the left parietal lobe.  3.  Underlying advanced  presumed chronic small vessel ischemic changes.  4.  Ventriculomegaly which is disproportionate to the degree of volume loss elsewhere. In the correct clinical setting, the appearance would raise the possibility of a normal pressure/communicating hydrocephalus.    HEAD MRA:  1.  As demonstrated on the recent CTA, there is focal high-grade narrowing involving the proximal posterior left M2 division with diminished flow-related signal beyond this point.  2.  Multifocal areas of irregularity and narrowing are demonstrated throughout the anterior circulation.      NOTE: ABNORMAL REPORT    THE DICTATION ABOVE DESCRIBES AN ABNORMALITY FOR WHICH FOLLOWUP IS NEEDED.    MR Brain w/o Contrast    Narrative    EXAM: MR BRAIN W/O CONTRAST, MRA BRAIN ([place] OF [place]) W/O CONTRAST  LOCATION: [facility]  DATE: 04/28/2025    INDICATION: Possible new M2 thrombus, new right-sided weakness.  COMPARISON: CT/CTA same day. MRI brain 06/11/2021.  TECHNIQUE:   1) Routine multiplanar multisequence head MRI without intravenous contrast.  2) 3D time-of-flight head MRA without intravenous contrast.    FINDINGS:    HEAD MRI:  INTRACRANIAL CONTENTS: Multiple subcentimeter foci of restricted diffusion are demonstrated scattered in the posterior left middle cerebral artery territory with involvement in the periventricular white matter along the occipital horn, periatrial region,   as well as along the inferior left insular cortex, posterior basal ganglia, and left frontoparietal junction. No appreciable mass effect or evidence of associated hemorrhage. Moderate chronic infarct demonstrated in the left parietal lobe. No mass,   acute hemorrhage, or extra-axial fluid collections. Confluent nonspecific T2/FLAIR hyperintensities within the cerebral white matter most consistent with advanced microvascular ischemic change. Ventriculomegaly disproportionate to the degree of cerebral   volume loss. Correlate for normal pressure  hydrocephalus. Normal position of the cerebellar tonsils.     SELLA: Empty sella morphology with flattening of the pituitary gland along the sellar floor.    OSSEOUS STRUCTURES/SOFT TISSUES: Normal marrow signal. The major intracranial vascular flow voids are maintained.     ORBITS: No abnormality accounting for technique.     SINUSES/MASTOIDS: No paranasal sinus mucosal disease. No middle ear or mastoid effusion.       HEAD MRA:   ANTERIOR CIRCULATION: As demonstrated on the recent CTA, focal high-grade narrowing involving the proximal posterior left M2 division with diminished flow-related signal beyond this point. Multiple additional areas of non-flow-limiting irregularity and   narrowing are demonstrated throughout the anterior circulation. Within the limits of MRA, no convincing aneurysm or high-flow vascular malformation. Developmentally hypoplastic right A1 anterior cerebral artery segment.    POSTERIOR CIRCULATION: No large vessel occlusion or flow-limiting stenosis. Multifocal areas of irregularity and narrowing are demonstrated. Balanced vertebral arteries supply a normal basilar artery.         Impression    IMPRESSION:  HEAD MRI:  1.  Multiple subcentimeter foci of acute/early subacute cerebral infarction are demonstrated scattered in the posterior left middle cerebral artery territory without mass effect or evidence of associated hemorrhage.  2.  Moderate chronic infarct in the left parietal lobe.  3.  Underlying advanced presumed chronic small vessel ischemic changes.  4.  Ventriculomegaly which is disproportionate to the degree of volume loss elsewhere. In the correct clinical setting, the appearance would raise the possibility of a normal pressure/communicating hydrocephalus.    HEAD MRA:  1.  As demonstrated on the recent CTA, there is focal high-grade narrowing involving the proximal posterior left M2 division with diminished flow-related signal beyond this point.  2.  Multifocal areas of  irregularity and narrowing are demonstrated throughout the anterior circulation.      NOTE: ABNORMAL REPORT    THE DICTATION ABOVE DESCRIBES AN ABNORMALITY FOR WHICH FOLLOWUP IS NEEDED.    Echocardiogram Complete - For age > 60 yrs     Value    LVEF  65%    Narrative    [id number]  [id number]  [id number]^REYNALDO^DEANN^FAITH     [facility]  Echocardiography Laboratory  [address] [facility] [place] [address]     Name: CLARKE AMES  MRN: [id number]  : 1940  Study Date: 2025 11:28 AM  Age: 85 yrs  Gender: Female  Patient Location: [facility]  Reason For Study: Cerebrovascular Incident  Ordering Physician: DEANN JACOBS  Referring Physician: Winnie Vilchis  Performed By: Jeff Little     BSA: 2.2 m2  Height: 72 in  Weight: 213 lb  HR: 61  BP: 141/97 mmHg  ______________________________________________________________________________  Procedure  Echocardiogram with two-dimensional, color and spectral Doppler. Definity (NDC  #[id number]) given intravenously.  ______________________________________________________________________________  Interpretation Summary     1. The left ventricle is normal in structure, function and size. The visual  ejection fraction is estimated at 65%.  2. The right ventricle is normal in structure, function and size.  3. No valve disease.     No changes from echo 2021.  ______________________________________________________________________________  Left Ventricle  The left ventricle is normal in structure, function and size. A sigmoid septum  is present. The visual ejection fraction is estimated at 65%. Grade I or early  diastolic dysfunction. Normal left ventricular wall motion.     Right Ventricle  The right ventricle is normal in structure, function and size.     Atria  Normal left atrial size. Right atrial size is normal. There is no atrial shunt  seen.     Mitral Valve  The mitral valve is normal in structure and function.     Tricuspid Valve  No  tricuspid regurgitation.     Aortic Valve  There is mild trileaflet aortic sclerosis. No aortic stenosis is present.     Pulmonic Valve  The pulmonic valve is normal in structure and function.     Vessels  Normal ascending, transverse (arch), and descending aorta. The inferior vena  cava was normal in size with preserved respiratory variability.     Pericardium  There is no pericardial effusion.     Rhythm  Sinus rhythm was noted.  ______________________________________________________________________________  MMode/2D Measurements & Calculations  IVSd: 0.92 cm     LVIDd: 3.6 cm  LVIDs: 2.1 cm  LVPWd: 0.92 cm  FS: 42.7 %  LV mass(C)d: 97.1 grams  LV mass(C)dI: 44.4 grams/m2  Ao root diam: 2.9 cm  asc Aorta Diam: 3.0 cm  LVOT diam: 1.9 cm  LVOT area: 2.9 cm2  Ao root diam index Ht(cm/m): 1.6  Ao root diam index BSA (cm/m2): 1.3  Asc Ao diam index BSA (cm/m2): 1.4  Asc Ao diam index Ht(cm/m): 1.6  LA Volume (BP): 24.8 ml     LA Volume Index (BP): 11.3 ml/m2  RV Base: 2.4 cm  RWT: 0.51  TAPSE: 1.7 cm     Doppler Measurements & Calculations  MV E max justin: 68.4 cm/sec  MV A max justin: 117.0 cm/sec  MV E/A: 0.58  MV dec time: 0.32 sec  Ao V2 max: 151.0 cm/sec  Ao max P.0 mmHg  Ao V2 mean: 100.0 cm/sec  Ao mean P.0 mmHg  Ao V2 VTI: 31.7 cm  DAHLIA(I,D): 2.3 cm2  DAHLIA(V,D): 2.5 cm2  LV V1 max P.2 mmHg  LV V1 max: 134.0 cm/sec  LV V1 VTI: 25.7 cm  SV(LVOT): 73.4 ml  SI(LVOT): 33.5 ml/m2  PA acc time: 0.07 sec  AV Justin Ratio (DI): 0.89  DAHLIA Index (cm2/m2): 1.1  E/E' av.8     Lateral E/e': 9.5  Medial E/e': 16.1  RV S Justin: 13.4 cm/sec     ______________________________________________________________________________  Report approved by: Spencer Cedillo MD on 2025 02:47 PM             Discharge Medications   Discharge Medication List as of 2025  2:19 PM        START taking these medications    Details   clopidogrel (PLAVIX) 75 MG tablet Take 1 tablet (75 mg) by mouth daily., Disp-89 tablet, R-0,  [id number] days then STOP           CONTINUE these medications which have CHANGED    Details   aspirin (ASA) 325 MG tablet Take 1 tablet (325 mg) by mouth daily., Disp-30 tablet, R-2, E-Prescribe      atorvastatin (LIPITOR) 80 MG tablet Take 1 tablet (80 mg) by mouth or Feeding Tube daily., Disp-30 tablet, R-0, E-Prescribe           CONTINUE these medications which have NOT CHANGED    Details   acetaminophen (TYLENOL) 325 MG tablet Take 325-650 mg by mouth every 6 hours as needed , Historical      carvedilol (COREG) 25 MG tablet Take 12.5 mg by mouth 2 times daily (with meals) , Historical      co-enzyme Q-10 100 MG CAPS capsule Take 100 mg by mouth daily., Historical      econazole nitrate 1 % external cream Apply topically daily. Apply to bilateral feetHistorical      glipiZIDE (GLUCOTROL XL) 5 MG 24 hr tablet Take 5 mg by mouth daily., Historical      losartan (COZAAR) 100 MG tablet Take 100 mg by mouth daily, Historical      mirtazapine (REMERON) 7.5 MG tablet Take 3.75 mg by mouth at bedtime., Historical      multivitamin, therapeutic with minerals (THERA-VIT-M) TABS Take 1 tablet by mouth daily, Historical      ondansetron (ZOFRAN ODT) 4 MG ODT tab Take 4 mg by mouth every 8 hours as needed for nausea., Historical      OYSTER SHELL CALCIUM/D 500-200 MG-UNIT tablet Take 1 tablet by mouth daily., PURVI, Historical      triamcinolone (KENALOG) 0.025 % cream Apply topically 2 times daily as needed. To Bilateral feetHistorical      vitamin D3 (CHOLECALCIFEROL) 1.25 MG (11124 UT) capsule Take 50,000 Units by mouth every 7 days., Historical           STOP taking these medications       ezetimibe (ZETIA) 10 MG tablet Comments:   Reason for Stopping:             Allergies   Allergies   Allergen Reactions    Dicyclomine Unknown and Dizziness     Started at [facility] 10/17; d/c'd shortly after due to SEs    Hydrochlorothiazide Unknown     Stopped taking due to abdominal pain    Isosorbide Nitrate Other (See Comments)      Severe leg pain  Leg pain  Severe leg pain

## 2025-04-30 NOTE — PROGRESS NOTES
Care Management Follow Up    Length of Stay (days): 2    Expected Discharge Date: 04/30/2025     Concerns to be Addressed:       Patient plan of care discussed at interdisciplinary rounds: Yes    Anticipated Discharge Disposition:                Anticipated Discharge Services:    Anticipated Discharge DME:      Patient/family educated on Medicare website which has current facility and service quality ratings:    Education Provided on the Discharge Plan:    Patient/Family in Agreement with the Plan: yes    Referrals Placed by CM/SW:    Private pay costs discussed: Not applicable    Discussed  Partnership in Safe Discharge Planning  document with patient/family: No     Handoff Completed: No, handoff not indicated or clinically appropriate    Additional Information:  Per PT, patient's family is requesting a Rosey Stedy for use at home.  They do not want to wait to get through her waiver.  Patient has orders for discharge today.  Spoke to patient's son.  Explained that she is discharged and this equipment would not be a barrier to discharge as she already has a froy lift.  Recommended they contact her PCP about obtaining this equipment and that OP PT/OT may be able to assist them as well.  He stated understanding.    Tanisha Stone RN, BSN, PHN  Inpatient Care Coordination  [facility]  Phone: [phone]

## 2025-04-30 NOTE — PLAN OF CARE
Reason for Admission: L M2 MCA occlusion    Cognitive/Mentation: Nonverbal  Neuros/CMS: Moves all extremities, does not follow any commands, R sided weakness with R facial droop. RLE withdraws.  VS: VSS on RA.   Tele: NSR with BBB.  /GI: Incontinent female external cath in place  Pulmonary: LS clear.  Pain: No nonverbal signs of pain.     Drains/Lines: PIV SL  Skin: Intact  Activity: Assist x 2 with lift.  Diet: Puree with slightly thick liquids. Takes pills whole.     Therapies recs: Home w/ assist  Discharge: Possibly today    Aggression Stoplight Tool: Green    End of shift summary: Patient vitally and neurologically stable through shift. Son slept over and helpful with cares. BSG stable through the night.

## 2025-04-30 NOTE — PROGRESS NOTES
Pt discharged home with family. PIV removed. Discharge meds provided. Discharge education given to family. Will follow up with neurology, neurosurgery & PCP.

## 2025-05-01 ENCOUNTER — PATIENT OUTREACH (OUTPATIENT)
Dept: CARE COORDINATION | Facility: CLINIC | Age: 85
End: 2025-05-01
Payer: COMMERCIAL

## 2025-05-01 NOTE — PLAN OF CARE
Speech Language Therapy Discharge Summary    Reason for therapy discharge:    Discharged to home with outpatient therapy.    Progress towards therapy goal(s). See goals on Care Plan in Epic electronic health record for goal details.  Goals not met.  Barriers to achieving goals:   discharge from facility.    Therapy recommendation(s):    Continued therapy is recommended.  Rationale/Recommendations:  Patient should have short course of SLP needs for dysphagia management.Discharged on IDDSI level 4 puree and slightly thick liquids.

## 2025-05-01 NOTE — PROGRESS NOTES
Clinic Care Coordination Contact  Transitions of Care Outreach    Chief Complaint   Patient presents with    Clinic Care Coordination - Post Hospital       Most Recent Admission Date: 4/28/2025   Most Recent Admission Diagnosis: Bifascicular block - I45.2  Right sided weakness - R53.1  Acute CVA (cerebrovascular accident) (H) - I63.9     Most Recent Discharge Date: 4/30/2025   Most Recent Discharge Diagnosis: Right sided weakness - R53.1  Bifascicular block - I45.2  Acute CVA (cerebrovascular accident) (H) - I63.9  Cerebrovascular accident (CVA) due to thrombosis of left middle cerebral artery (H) - I63.312  Abnormal brain MRI - R90.89  Cerebral ventriculomegaly - G93.89  Oropharyngeal dysphagia - R13.12     Transitions of Care Assessment    Discharge Assessment  How are you doing now that you are home?: talked with Pt'elicia keating, she stated that Pt is doing well.  How are your symptoms? (Red Flag symptoms escalate to triage hotline per guidelines): Improved  Do you know how to contact your clinic care team if you have future questions or changes to your health status? : Yes  Does the patient have their discharge instructions? : Yes  Does the patient have questions regarding their discharge instructions? : No  Were you started on any new medications or were there changes to any of your previous medications? : Yes  Does the patient have all of their medications?: Yes  Do you have questions regarding any of your medications? : No  Do you have all of your needed medical supplies or equipment (DME)?  (i.e. oxygen tank, CPAP, cane, etc.): Yes    Post-op ([facility] CTA Only)  If the patient had a surgery or procedure, do they have any questions for a nurse?: No         Follow up Plan     No future appointments.  Outpatient Plan as outlined on AVS reviewed with patient.      For any urgent concerns, please contact our 24 hour nurse triage line: [phone]     Mk, CHW  [phone]  [facility]  [place]

## 2025-05-05 ENCOUNTER — PATIENT OUTREACH (OUTPATIENT)
Dept: CARE COORDINATION | Facility: CLINIC | Age: 85
End: 2025-05-05
Payer: COMMERCIAL

## 2025-05-05 ENCOUNTER — TELEPHONE (OUTPATIENT)
Dept: NEUROSURGERY | Facility: CLINIC | Age: 85
End: 2025-05-05
Payer: COMMERCIAL

## 2025-05-05 NOTE — TELEPHONE ENCOUNTER
[facility] Call Center    Phone Message    May a detailed message be left on voicemail: yes     Reason for Call: Other: Pt son Dustin is calling and wanting to schedule appt for Pt, Please review referral and call Dustin back to schedule appt.      Action Taken: Message routed to:  [facility] Neurosurgery    Travel Screening: Not Applicable     Date of Service:

## 2025-05-07 ENCOUNTER — THERAPY VISIT (OUTPATIENT)
Dept: SPEECH THERAPY | Facility: CLINIC | Age: 85
End: 2025-05-07
Attending: PHYSICIAN ASSISTANT
Payer: COMMERCIAL

## 2025-05-07 ENCOUNTER — THERAPY VISIT (OUTPATIENT)
Dept: OCCUPATIONAL THERAPY | Facility: CLINIC | Age: 85
End: 2025-05-07
Attending: PHYSICIAN ASSISTANT
Payer: COMMERCIAL

## 2025-05-07 DIAGNOSIS — R27.9 LACK OF COORDINATION: ICD-10-CM

## 2025-05-07 DIAGNOSIS — I63.312 CEREBROVASCULAR ACCIDENT (CVA) DUE TO THROMBOSIS OF LEFT MIDDLE CEREBRAL ARTERY (H): Primary | ICD-10-CM

## 2025-05-07 DIAGNOSIS — I63.312 CEREBROVASCULAR ACCIDENT (CVA) DUE TO THROMBOSIS OF LEFT MIDDLE CEREBRAL ARTERY (H): ICD-10-CM

## 2025-05-07 PROCEDURE — 97167 OT EVAL HIGH COMPLEX 60 MIN: CPT | Mod: GO

## 2025-05-07 PROCEDURE — 97530 THERAPEUTIC ACTIVITIES: CPT | Mod: GO

## 2025-05-07 PROCEDURE — 92610 EVALUATE SWALLOWING FUNCTION: CPT | Mod: GN

## 2025-05-07 NOTE — PROGRESS NOTES
OCCUPATIONAL THERAPY EVALUATION  Type of Visit: Evaluation    Fall Risk Screen:  Have you fallen 2 or more times in the past year?: No  Have you fallen and had an injury in the past year?: No  Is patient receiving Physical Therapy Services?: Yes    Subjective        Presenting condition or subjective complaint:   Pt was recently hospitalized due to L MCA stroke on 4/28/2025  Date of onset: 04/28/25    Relevant medical history:   Please see EMR for full listing.   Dates & types of surgery:   Please see EMR for full listing.     Prior diagnostic imaging/testing results:     Please see EMR for full listing.  Prior therapy history for the same diagnosis, illness or injury:     No    Prior Level of Function  Transfers: Assistive person  Ambulation: Assistive person and walker  ADL: Assistive person  IADL: Dependent    Living Environment  Social support:   Pt lives with her daughter. Her son also comes over often to help with care giving tasks.    Type of home:   House  Stairs to enter the home:       Yes, there are 5 steps  Ramp:   Pt currently has a portable ramp into her home  Stairs inside the home:       Yes, to the basement, but pt does not need to utilize   Help at home:   Daughter and son are there 24/7 to provide assistance as needed with ADL, IADL and mobility tasks  Equipment owned:   Wheelchair, tub slider system in shower    Employment:     Pt is not working.   Hobbies/Interests:   Daughter reports pt enjoys watching TV and spending time with family    Patient goals for therapy:   Pt daughter reports goals to acquire necessary equipment to maximize pt safety and independence at home and improve pt safety and independence with transfers and daily routine tasks    Pain assessment: Pain denied     Objective   Cognitive Status Examination  Orientation: Unable to assess due to language deficits   Level of Consciousness: Alert, Confused  Follows Commands and Answers Questions: Inconsistent command  following  Personal Safety and Judgement: Impaired  Memory: Impaired  Attention: Impaired  Organization/Problem Solving: Impaired  Executive Function: Impaired  Comments: Pt has baseline dementia which her daughter reports has been progressing over time. Pt daughter additionally reports cognition has been worse since the stroke. Difficulty to rule out cognition vs language deficits.     VISUAL SKILLS  Visual Attention: Suspect right visual inattention. Left eye gaze preference.   Oculomotor: Unable to formally assess. Pt able to track therapist right and left with significant verbal and tactile cueing.     SENSATION: Unable to formally assess due to language deficits    POSTURE: Sitting Posture: Right sided lateral leaning while seated in wheelchair  RANGE OF MOTION: PROM within functional limits on BUE  STRENGTH: Hemiplegia in RUE, no motor activation noted throughout. LUE within functional limits  MUSCLE TONE: WFL  COORDINATION: Impaired RUE coordination  BALANCE: Please see PT note for detailed information. Midline impairments.     FUNCTIONAL MOBILITY  Ambulation: Caregiver reports pt has been unable to walk since the hospitalization and her most recent stroke    BED MOBILITY: Dependent x2    TRANSFERS: Dependent using the froy lift     BATHING: Dependent  Equipment: Tub slider system which they bought off of [employer]    UPPER BODY DRESSING: Dependent    LOWER BODY DRESSING: Dependent    TOILETING: Dependent    GROOMING: Dependent    EATING/SELF FEEDING: Dependent     INSTRUMENTAL ACTIVITIES OF DAILY LIVING (IADL):   Meal Planning/Prep: Dependent  Home/Financial Management: Dependent  Communication/Computer Use: Dependent  Community Mobility: Dependent  Cleaning/Laundry: Dependent  Medication Management: Dependent  Exercise: None    Assessment & Plan   CLINICAL IMPRESSIONS  Medical Diagnosis: Cerebrovascular accident (CVA) due to thrombosis of left middle cerebral artery (H)    Treatment Diagnosis: R hemiplegia,  lack of coordination, impaired ADL/IADL    Impression/Assessment: Pt is a 85 year old female presenting to Occupational Therapy due to in RUE strength, coordination, midline orientation, cognitive communication and balance impacting ADL/IADL performance. These identified deficits interfere with their ability to perform self care tasks, recreational activities, household chores, household mobility, medication management, financial management, and meal planning and preparation as compared to previous level of function. Rehab services are directly related to a doctor's order and are reasonable and necessary due to pt medical conditions and multiple complexities. Without therapy, pt is at risk for increased caregiver burden, poor quality of life and further injury. Due to pt medical complexities, it is unlikely she would make a spontaneous recovery without the intervention of skilled therapy making it therefore indicated. All of the above and previously listed impairments (please see EMR for further detail) significantly impact pt ability to safely and independently complete activities of daily living and mobility. Patient requires skilled occupational therapy services to address listed deficits and to maximize safety and independence with ADLs, functional mobility, and home management tasks.       Clinical Decision Making (Complexity):  Assessment of Occupational Performance: 5 or more Performance Deficits  Occupational Performance Limitations: bathing/showering, toileting, feeding, functional mobility, hygiene and grooming, health management and maintenance, home establishment and management, and leisure activities  Clinical Decision Making (Complexity): High complexity    PLAN OF CARE  Treatment Interventions:  Interventions: Cognitive Skills, Self-Care/Home Management, Therapeutic Activity, Therapeutic Exercise, Neuromuscular Re-education    Long Term Goals   OT Goal 1  Goal Identifier: HEP  Goal Description: By  goal review date, pt and caregiver will demonstrate through teach back method understanding of an appropriate home program to address RUE strength and coordination as it relates to engagement in daily routine tasks.  Rationale: In order to maximize safety and independence with ADL/IADLs  Target Date: 08/04/25  OT Goal 2  Goal Identifier: Adaptive Equipment  Goal Description: By goal review date, pt and caregiver will demonstrate understanding of adaptive equipment to utilize at home in order maximize pt safety and independence during daily routine tasks.  Rationale: In order to maximize safety and independence with ADL/IADLs  Target Date: 08/04/25  OT Goal 3  Goal Identifier: Toilet Transfer  Goal Description: By goal review date, pt and caregiver will demonstrate ability to safely complete functional transfer to/from the toilet with use of least restrictive transfer device with no more than maximal assistance x1.  Rationale: In order to maximize safety and independence with ADL/IADLs  Target Date: 08/04/25  OT Goal 4  Goal Identifier: Positioning  Goal Description: By end of plan of care, pt and caregiver will demonstrate understanding of RUE positioning recommendations in order to maximize joint integrity for engagement in daily routine tasks.  Rationale: In order to maximize safety and independence with ADL/IADLs  Target Date: 08/04/25      Frequency of Treatment: 1x per week, frequency adjustments as clinically indiciated  Duration of Treatment: 90 Days     Education Assessment: Learner/Method: Patient;Family;Caregiver  Education Comments: Role of OT, rational for intervention, motor learning     Risks and benefits of evaluation/treatment have been explained.   Patient/Family/caregiver agrees with Plan of Care.     Evaluation Time:    OT Eval, High Complexity Minutes (83889): 30     Signing Clinician: ORI Magdaleno    [facility]                                                                                    OUTPATIENT OCCUPATIONAL THERAPY      PLAN OF TREATMENT FOR OUTPATIENT REHABILITATION   Patient's Last Name, First Name, Ariana Alan YOB: 1940   Provider's Name   [facility]   Medical Record No.  [id number]     Onset Date: 04/28/25 Start of Care Date: 05/07/25     Medical Diagnosis:  Cerebrovascular accident (CVA) due to thrombosis of left middle cerebral artery (H)      OT Treatment Diagnosis:  R hemiplegia, lack of coordination, impaired ADL/IADL Plan of Treatment  Frequency/Duration:1x per week, frequency adjustments as clinically indiciated/90 Days    Certification date from 05/07/25   To 08/04/25        See note for plan of treatment details and functional goals     Lashaun Hwang, OTR                         I CERTIFY THE NEED FOR THESE SERVICES FURNISHED UNDER        THIS PLAN OF TREATMENT AND WHILE UNDER MY CARE     (Physician attestation of this document indicates review and certification of the therapy plan).              Referring Provider:  Maribel Luna    Initial Assessment  See Epic Evaluation- 05/07/25

## 2025-05-07 NOTE — PROGRESS NOTES
SPEECH LANGUAGE PATHOLOGY EVALUATION       Fall Risk Screen:  Have you fallen 2 or more times in the past year?: No  Have you fallen and had an injury in the past year?: No  Is patient receiving Physical Therapy Services?: Yes    Subjective   Ariana Barnes is a 85 year old female with PMH significant for diabetes, hypertension, dyslipidemia, h/o CVA (with residual right sided weakness, total dependence), dementia who was brought to ED for worsening of her baseline expressive aphasia and worsening right-sided weakness, stroke workup noted with left M2 subocclusive thrombus, admitted inpatient 4/28/25. She had some baseline right-sided weakness and expressive aphasia from prior stroke. She used to be on Brilinta which was changed to aspirin during PCP visit on 4/17/25 due to adverse effects from Brilinta (family not elaborating further about side effects). She is total cares at baseline and dependent for all ADLs.  Presenting condition or subjective complaint:    Date of onset:   4/28/2025   Relevant medical history:     Dates & types of surgery:      Prior diagnostic imaging/testing results:       Prior therapy history for the same diagnosis, illness or injury:        Living Environment  Social support:     Help at home:    Equipment owned:       Employment:      Hobbies/Interests:      Patient goals for therapy:      Pain assessment: Pain denied     Objective     SWALLOW EVALUTION  Dysphagia history: Patient seen in 2021 for a clinical swallow evaluation and at that time a dysphagia diet level 2 and thin liquids was rec. Clinical swallow eval completed on 4/29 following recent stroke and recommended level 1 liquids and puree diet.   Current Diet/Method of Nutritional Intake: slightly thick liquids (level 1), pureed (level 4)        CLINICAL SWALLOW EVALUATION  Oral Motor Function: unable to assess due to poor participation/comprehension  Dentition: natural dentition, adequate dentition  Mucosal quality:  adequate  Laryngeal function: volitional swallow, delayed     Level of assist required for feeding: dependent   Textures Trialed:   Clinical Swallow Eval: Thin Liquids  Mode of presentation: spoon, fed by clinician   Volume presented: 2 tbsp of water  Preparatory Phase: poor bolus control  Oral Phase: impaired AP movement, premature pharyngeal entry  Pharyngeal phase of swallow: impaired, reduction in laryngeal movement   Strategies trialed during procedure: KOLBY SLP CLINICAL EVAL STRATEGIES: small bolus size, Pt's daughter preference via spoon   Diagnostic statement: Reduced bolus control and AP movement with premature pharyngeal entry suspected. Suspect penetration or silent aspiration.     Clinical Swallow Eval: Slightly Thick Liquids  Mode of presentation: cup, fed by clinician   Volume presented: 3 sips of water  Preparatory Phase: WFL  Oral Phase: impaired AP movement, residue in oral cavity, premature pharyngeal entry  Pharyngeal phase of swallow: impaired, reduction in laryngeal movement, delayed swallow   Strategies trialed during procedure: KOLBY SLP CLINICAL EVAL STRATEGIES: small bolus size   Diagnostic statement: Reduced AP movement with minimal oral residue and delayed swallow response.     Clinical Swallow Eval: Purees  Mode of presentation: spoon, fed by clinician   Volume presented: 2 bites of pudding  Preparatory Phase: anterior loss of bolus, poor bolus control  Oral Phase: impaired AP movement, residue in oral cavity  Pharyngeal phase of swallow: impaired, reduction in laryngeal movement, delayed swallow   Strategies trialed during procedure: KOLBY SLP CLINICAL EVAL STRATEGIES: small bolus size   Diagnostic statement: Delayed AP movement, mild tongue thrusting and minimal/mild oral residue.      ADDITIONAL EVAL COMPLETED TODAY : none    ESOPHAGEAL PHASE OF SWALLOW  no observed or reported concerns related to esophageal function     SWALLOW ASSESSMENT CLINICAL IMPRESSIONS AND RATIONALE  Diet  Consistency Recommendations: slightly thick liquids (level 1), pureed (level 4)    Recommended Feeding/Eating Techniques: supervision needed, assistance needed for feeding, small bolus size, maintain upright sitting position for eating, maintain upright posture during/after eating for 30 minutes, minimize distractions during oral intake   Medication Administration Recommendations: crushed  Instrumental Assessment Recommendations: instrumental evaluation not recommended at this time     Assessment & Plan   CLINICAL IMPRESSIONS   Medical Diagnosis:    Cerebrovascular accident (CVA) due to thrombosis of left middle cerebral artery (H) (I63.312) - Primary   Treatment Diagnosis:  moderate to severe oropharyngeal dysphagia   Impression/Assessment: Pt is a 85 year old female with swallowing/feeding complaints. The following significant findings have been identified: impaired feeding and impaired swallowing, characterized by right sided impairment and facial droop. Pt demonstrated poor bolus control and AP movement of the bolus with delayed swallow response with suspected silent aspiration of thin liquids given delay and no cough response (daughter reports coughing at home) Identified deficits interfere with their ability to consume an oral diet and maintain nutrition as compared to previous level of function.    PLAN OF CARE  Treatment Interventions: Swallowing dysfunction and/or oral function for feeding    Prognosis to achieve stated therapy goals is fair   Rehab potential is impacted by: comorbidities, current level of function    Long Term Goals:    Goal Identifier: Swallow   Goal Description: Patient will safely and efficiently tolerate least restrictive diet without overt s/s of penetration/aspiration.  Rationale: To maximize safety, ease and/or independence of oral intake;   Target Date: 8/4/2025     Frequency of Treatment:  1x per week  Duration of Treatment:   up to 90 days    Recommended Referrals to Other  Professionals: also seeing PT and OT  Education Assessment: Patient;Family;Listening;Demonstration;    Results of assessment, recommended POC, role of SLP in OP     Risks and benefits of evaluation/treatment have been explained.   Patient/Family/caregiver agrees with Plan of Care.     Evaluation Time: Oral/Pharyngeal Swallow Function; 30 minutes        Present: No: Pt's daughter declined and chose to interpret     Signing Clinician: ANA Rodriguez      [facility]                                                                                   OUTPATIENT SPEECH LANGUAGE PATHOLOGY      PLAN OF TREATMENT FOR OUTPATIENT REHABILITATION   Patient's Last Name, First Name, ARIELLAAriana Rizo YOB: 1940   Provider's Name   [facility]   Medical Record No.  [id number]     Onset Date:  4/28/25 Start of Care Date:  5/7/2025      Medical Diagnosis:     Cerebrovascular accident (CVA) due to thrombosis of left middle cerebral artery (H) [I63.312]  - Primary          SLP Treatment Diagnosis:   moderate to severe oropharyngeal dysphagia Plan of Treatment  Frequency/Duration:  1x per week  /   up to 90 days    Certification date from  5/7/2025    To     8/4/2025        See note for plan of treatment details and functional goals     ANA Rodriguez                         I CERTIFY THE NEED FOR THESE SERVICES FURNISHED UNDER        THIS PLAN OF TREATMENT AND WHILE UNDER MY CARE     (Physician attestation of this document indicates review and certification of the therapy plan).              Referring Provider:  Maribel Luna    Initial Assessment  See Epic Evaluation-

## 2025-05-13 ENCOUNTER — THERAPY VISIT (OUTPATIENT)
Dept: SPEECH THERAPY | Facility: CLINIC | Age: 85
End: 2025-05-13
Attending: PHYSICIAN ASSISTANT
Payer: COMMERCIAL

## 2025-05-13 DIAGNOSIS — I63.312 CEREBROVASCULAR ACCIDENT (CVA) DUE TO THROMBOSIS OF LEFT MIDDLE CEREBRAL ARTERY (H): Primary | ICD-10-CM

## 2025-05-13 PROCEDURE — 92526 ORAL FUNCTION THERAPY: CPT | Mod: GN

## 2025-05-15 ENCOUNTER — THERAPY VISIT (OUTPATIENT)
Dept: PHYSICAL THERAPY | Facility: CLINIC | Age: 85
End: 2025-05-15
Attending: PHYSICIAN ASSISTANT
Payer: COMMERCIAL

## 2025-05-15 DIAGNOSIS — I63.312 CEREBROVASCULAR ACCIDENT (CVA) DUE TO THROMBOSIS OF LEFT MIDDLE CEREBRAL ARTERY (H): Primary | ICD-10-CM

## 2025-05-15 DIAGNOSIS — R53.1 RIGHT SIDED WEAKNESS: ICD-10-CM

## 2025-05-15 PROCEDURE — 97530 THERAPEUTIC ACTIVITIES: CPT | Mod: GP | Performed by: PHYSICAL THERAPIST

## 2025-06-03 ENCOUNTER — TRANSCRIBE ORDERS (OUTPATIENT)
Dept: OTHER | Age: 85
End: 2025-06-03

## 2025-06-03 DIAGNOSIS — I63.312 CEREBROVASCULAR ACCIDENT (CVA) DUE TO THROMBOSIS OF LEFT MIDDLE CEREBRAL ARTERY (H): Primary | ICD-10-CM

## 2025-06-03 DIAGNOSIS — R13.10 DYSPHAGIA: ICD-10-CM

## 2025-06-05 LAB — CV ZIO PRELIM RESULTS: NORMAL

## 2025-06-06 ENCOUNTER — RESULTS FOLLOW-UP (OUTPATIENT)
Dept: FAMILY MEDICINE | Facility: CLINIC | Age: 85
End: 2025-06-06

## 2025-06-12 ENCOUNTER — THERAPY VISIT (OUTPATIENT)
Dept: PHYSICAL THERAPY | Facility: CLINIC | Age: 85
End: 2025-06-12
Attending: PHYSICIAN ASSISTANT
Payer: COMMERCIAL

## 2025-06-12 ENCOUNTER — THERAPY VISIT (OUTPATIENT)
Dept: OCCUPATIONAL THERAPY | Facility: CLINIC | Age: 85
End: 2025-06-12
Attending: PHYSICIAN ASSISTANT
Payer: COMMERCIAL

## 2025-06-12 DIAGNOSIS — R53.1 RIGHT SIDED WEAKNESS: ICD-10-CM

## 2025-06-12 DIAGNOSIS — Z78.9 IMPAIRED MOBILITY AND ADLS: Primary | ICD-10-CM

## 2025-06-12 DIAGNOSIS — I63.9 ACUTE CVA (CEREBROVASCULAR ACCIDENT) (H): ICD-10-CM

## 2025-06-12 DIAGNOSIS — Z74.09 IMPAIRED MOBILITY AND ADLS: Primary | ICD-10-CM

## 2025-06-12 DIAGNOSIS — I63.312 CEREBROVASCULAR ACCIDENT (CVA) DUE TO THROMBOSIS OF LEFT MIDDLE CEREBRAL ARTERY (H): Primary | ICD-10-CM

## 2025-06-12 PROCEDURE — 97542 WHEELCHAIR MNGMENT TRAINING: CPT | Mod: GO | Performed by: OCCUPATIONAL THERAPIST

## 2025-06-12 PROCEDURE — 97530 THERAPEUTIC ACTIVITIES: CPT | Mod: GP | Performed by: PHYSICAL THERAPIST

## 2025-06-12 NOTE — PROGRESS NOTES
SEATING AND WHEELED MOBILITY ASSESSMENT    [facility]  Occupational Therapy   Date of service: June 12, 2025    Referring provider:   Maribel Luna PA-C        Order Date 4/30/25  Onset Date: 4/30/25    Order Details: Ot eval    Funding:[employer]     present: No  Language: daughter aids in communication    Others present at visit:  Child(leny)    Vendor: Nga T from [employer]    Height/Weight: 5 / 131    Medical diagnosis:   Nervous and Auditory  Coronary artery disease involving native coronary artery with angina pectoris  Right parietal infarction  Alzheimer's type dementia with late onset without behavioral disturbance (H)  Cerebrovascular accident (CVA) due to thrombosis of left middle cerebral artery (H)  Right sided weakness  Acute CVA (cerebrovascular accident) (H)     Digestive  IBS (irritable bowel syndrome)     Endocrine  DM (diabetes mellitus), type 2 with peripheral vascular complications (H)  Hyperlipidemia     Circulatory  Essential hypertension  Bifascicular block     Musculoskeletal and Integumentary  Compression fracture of L1 lumbar vertebra (H)  Osteoarthrosis     Behavioral  Depression       Patient concerns/goals: per ot POC, todays goals are for equipment    Social support:   Pt lives with her daughter. Her son also comes over often to help with care giving tasks.    Type of home:   House  Stairs to enter the home:       Yes, there are 5 steps  Ramp:   Pt currently has a portable ramp into her home  Stairs inside the home:       Yes, to the basement, but pt does not need to utilize   Help at home:   Daughter and son are there 24/7 to provide assistance as needed with ADL, IADL and mobility tasks  Equipment owned:   Wheelchair, tub slider system in shower     Employment:     Pt is not working.   Hobbies/Interests:   Daughter reports pt enjoys watching TV and  spending time with family     Patient goals for therapy:   Pt daughter reports goals to acquire necessary equipment to maximize pt safety and independence at home and improve pt safety and independence with transfers and daily routine tasks     Pain assessment: Pain denied    Patient Measurements  16 wide    COGNITIVE STATUS EXAMINATION  Orientation: Unable to assess due to language deficits   Level of Consciousness: Alert, Confused  Follows Commands and Answers Questions: Inconsistent command following  Personal Safety and Judgement: Impaired  Memory: Impaired  Attention: Impaired  Organization/Problem Solving: Impaired  Executive Function: Impaired  Comments: Pt has baseline dementia which her daughter reports has been progressing over time. Pt daughter additionally reports cognition has been worse since the stroke. Difficulty to rule out cognition vs language deficits.      VISUAL SKILLS  Visual Attention: Suspect right visual inattention. Left eye gaze preference.   Oculomotor: Unable to formally assess. Pt able to track therapist right and left with significant verbal and tactile cueing.      SENSATION: Unable to formally assess due to language deficits     POSTURE: Sitting Posture: Right sided lateral leaning while seated in wheelchair  RANGE OF MOTION: PROM within functional limits on BUE, Right jing - wearing sling today Le PROM WFL with some active L movement  STRENGTH: Hemiplegia in RUE, no motor activation noted throughout. LUE within functional limits  MUSCLE TONE: WFL  COORDINATION: Impaired RUE coordination  BALANCE: Please see PT note for detailed information. Midline impairments. Sitting limitations notes.     FUNCTIONAL MOBILITY  Ambulation: Caregiver reports pt has been unable to walk since the hospitalization and her most recent stroke     BED MOBILITY: Dependent x2     TRANSFERS: Dependent using the froy lift      BATHING: Dependent  Equipment: Tub slider system which they bought off of [employer]      UPPER BODY DRESSING: Dependent    LOWER BODY DRESSING: Dependent     TOILETING: Dependent     GROOMING: Dependent    EATING/SELF FEEDING: Dependent      INSTRUMENTAL ACTIVITIES OF DAILY LIVING (IADL):   Meal Planning/Prep: Dependent  Home/Financial Management: Dependent  Communication/Computer Use: Dependent  Community Mobility: Dependent  Cleaning/Laundry: Dependent  Medication Management: Dependent  Exercise: None     Impairments:  Fatigue  Muscle atrophy  Coordination  Balance  Range of motion  Skin integrity     Treatment diagnosis:  Impaired mobility  Impaired activities of daily living     Recommendations/Plan of care:  Patient would benefit from interventions to enhance safety and independence.    Goals:   By goal review date, pt and caregiver will demonstrate understanding of adaptive equipment to utilize at home in order maximize pt safety and independence during daily routine tasks.     Educational assessment/barriers to learning:  Emotional  Cognitive  Visual  Cultural/spiritual  Language    Treatment provided this date:   Wheelchair management, 40 minutes   Determine need for proper fitting wheelchair in order to safely support posture and allow for increased participation in MRADLS.  Determined need for Tilt-In-Space chair and will trial smaller Liberte from [employer].  Headrest needed as well as deep laterals wedge cushion seat belt and right arm trough.  Also determine need for Tilt-In-Space tub slide system with supports as well.  Educated daughter on needs and collaborated on most appropriate equipment.  Home trials of equipment to be completed and then element to be completed by this therapist    Response to treatment/recommendations: daughter agreeable    Goal attainment:  All goals met    Risks and benefits of evaluation/treatment have been explained.  Patient, family and/or caregiver are in agreement with Plan of Care.     Timed Code Treatment Minutes: 40  Total Treatment Time (sum of timed  and untimed services): 40    Electronically signed by:  Nga DIAZ, ATP      Occupational Therapist, Assistive   [phone]      fax: [phone]      [email or web address], [address]  [place] [address]  June 12, 2025

## 2025-06-18 ENCOUNTER — APPOINTMENT (OUTPATIENT)
Dept: INTERPRETER SERVICES | Facility: CLINIC | Age: 85
End: 2025-06-18
Payer: COMMERCIAL

## 2025-06-26 ENCOUNTER — TELEPHONE (OUTPATIENT)
Dept: NEUROLOGY | Facility: CLINIC | Age: 85
End: 2025-06-26
Payer: COMMERCIAL

## 2025-06-26 NOTE — TELEPHONE ENCOUNTER
Attempted to reach patient to remind them about appointment scheduled with Scooby Hickey MD on 6/27/25 in our [place] clinic.    A voicemail was left with a call back number if the patient has questions or would like to reschedule.

## 2025-07-03 ENCOUNTER — TELEPHONE (OUTPATIENT)
Dept: PHYSICAL THERAPY | Facility: CLINIC | Age: 85
End: 2025-07-03
Payer: COMMERCIAL

## 2025-08-13 ENCOUNTER — THERAPY VISIT (OUTPATIENT)
Dept: OCCUPATIONAL THERAPY | Facility: CLINIC | Age: 85
End: 2025-08-13
Attending: PHYSICIAN ASSISTANT
Payer: COMMERCIAL

## 2025-08-13 DIAGNOSIS — I63.312 CEREBROVASCULAR ACCIDENT (CVA) DUE TO THROMBOSIS OF LEFT MIDDLE CEREBRAL ARTERY (H): ICD-10-CM

## 2025-08-13 DIAGNOSIS — Z74.09 IMPAIRED MOBILITY AND ADLS: Primary | ICD-10-CM

## 2025-08-13 DIAGNOSIS — R27.9 LACK OF COORDINATION: ICD-10-CM

## 2025-08-13 DIAGNOSIS — Z78.9 IMPAIRED MOBILITY AND ADLS: Primary | ICD-10-CM

## 2025-08-13 DIAGNOSIS — I63.9 ACUTE CVA (CEREBROVASCULAR ACCIDENT) (H): ICD-10-CM

## 2025-08-13 PROCEDURE — 97530 THERAPEUTIC ACTIVITIES: CPT | Mod: GO

## 2025-09-02 DIAGNOSIS — I63.312 CEREBROVASCULAR ACCIDENT (CVA) DUE TO THROMBOSIS OF LEFT MIDDLE CEREBRAL ARTERY (H): Primary | ICD-10-CM
